# Patient Record
Sex: MALE | Race: WHITE | Employment: UNEMPLOYED | URBAN - METROPOLITAN AREA
[De-identification: names, ages, dates, MRNs, and addresses within clinical notes are randomized per-mention and may not be internally consistent; named-entity substitution may affect disease eponyms.]

---

## 2018-07-03 ENCOUNTER — OFFICE VISIT (OUTPATIENT)
Dept: SURGERY | Facility: CLINIC | Age: 53
End: 2018-07-03

## 2018-07-03 VITALS
DIASTOLIC BLOOD PRESSURE: 82 MMHG | SYSTOLIC BLOOD PRESSURE: 125 MMHG | WEIGHT: 249 LBS | RESPIRATION RATE: 20 BRPM | HEART RATE: 54 BPM | BODY MASS INDEX: 29.4 KG/M2 | TEMPERATURE: 98 F | HEIGHT: 77 IN

## 2018-07-03 DIAGNOSIS — Z83.71 FAMILY HISTORY OF COLONIC POLYPS: Primary | ICD-10-CM

## 2018-07-03 PROBLEM — Z83.719 FAMILY HISTORY OF COLONIC POLYPS: Status: ACTIVE | Noted: 2018-07-03

## 2018-07-03 PROCEDURE — 99243 OFF/OP CNSLTJ NEW/EST LOW 30: CPT | Performed by: COLON & RECTAL SURGERY

## 2018-07-03 NOTE — H&P
New Patient Visit Note       Active Problems      1.  Family history of colonic polyps, in father        Chief Complaint   Patient presents with:  Colonoscopy: NEW PT - COLONOSCOPY CONSULT-denies problems or concerns-father has hx polyps      History of Pre does not have history of prior polyps. The patient does not have a history of prior colon cancer. The patient does not have a history of black/tarry stools. The patient does not have a history of bright red blood per rectum.   The patient does no Never Used                           Current Outpatient Prescriptions:  PEG 3350-KCl-Na Bicarb-NaCl (TRILYTE) 420 g Oral Recon Soln Starting at 4:00 pm the night before procedure, drink 8 ounces of the prep every 15-20 minutes until finished Disp: 1 Bottle distension. There is no tenderness. There is no rebound and no guarding. Musculoskeletal: Normal range of motion. He exhibits no edema. Neurological: He is alert and oriented to person, place, and time. Skin: Skin is warm and dry. No rash noted.  He i colonoscopy at our earliest mutual convenience, on August 9, 2018. All risks, benefits, complications and alternatives to the proposed operation were fully discussed with the patient. All questions from the patient were answered in detail.  A description

## 2018-07-04 RX ORDER — POLYETHYLENE GLYCOL 3350, SODIUM CHLORIDE, SODIUM BICARBONATE, POTASSIUM CHLORIDE 420; 11.2; 5.72; 1.48 G/4L; G/4L; G/4L; G/4L
POWDER, FOR SOLUTION ORAL
Qty: 1 BOTTLE | Refills: 0 | Status: SHIPPED | OUTPATIENT
Start: 2018-07-04 | End: 2018-08-13 | Stop reason: ALTCHOICE

## 2018-07-04 NOTE — PATIENT INSTRUCTIONS
The patient is a pleasant 65-year-old male who presents in consultation for a colonoscopy. The patient is referred to me by his primary provider, Dr. Key Owusu. The patient has never had a colonoscopy.   He denies any gastrointestinal or constitutional sym

## 2018-08-09 ENCOUNTER — LAB REQUISITION (OUTPATIENT)
Dept: LAB | Facility: HOSPITAL | Age: 53
End: 2018-08-09
Payer: COMMERCIAL

## 2018-08-09 DIAGNOSIS — Z12.11 ENCOUNTER FOR SCREENING FOR MALIGNANT NEOPLASM OF COLON: ICD-10-CM

## 2018-08-09 PROCEDURE — 88305 TISSUE EXAM BY PATHOLOGIST: CPT | Performed by: COLON & RECTAL SURGERY

## 2018-08-13 ENCOUNTER — TELEPHONE (OUTPATIENT)
Dept: SURGERY | Facility: CLINIC | Age: 53
End: 2018-08-13

## 2018-08-13 ENCOUNTER — OFFICE VISIT (OUTPATIENT)
Dept: SURGERY | Facility: CLINIC | Age: 53
End: 2018-08-13
Payer: COMMERCIAL

## 2018-08-13 VITALS
SYSTOLIC BLOOD PRESSURE: 145 MMHG | DIASTOLIC BLOOD PRESSURE: 92 MMHG | HEART RATE: 68 BPM | BODY MASS INDEX: 29.4 KG/M2 | HEIGHT: 77 IN | WEIGHT: 249 LBS

## 2018-08-13 DIAGNOSIS — C18.6 CANCER OF DESCENDING COLON (HCC): ICD-10-CM

## 2018-08-13 DIAGNOSIS — C20 RECTAL CANCER (HCC): Primary | ICD-10-CM

## 2018-08-13 DIAGNOSIS — D12.6 ADENOMATOUS POLYP OF COLON, UNSPECIFIED PART OF COLON: ICD-10-CM

## 2018-08-13 DIAGNOSIS — Z83.71 FAMILY HISTORY OF COLONIC POLYPS: ICD-10-CM

## 2018-08-13 PROCEDURE — 99215 OFFICE O/P EST HI 40 MIN: CPT | Performed by: COLON & RECTAL SURGERY

## 2018-08-13 NOTE — TELEPHONE ENCOUNTER
Called pt to inform him of need for flexible sigmoidoscopy tomorrow, to only take clear liquids for rest of day and the he needs to get 2 fleets enemas from any pharmacy.  He will also need a  tomorrow and that more of this would be explained that his

## 2018-08-14 NOTE — PROGRESS NOTES
Follow Up Visit Note       Active Problems      1. Rectal cancer (HCC) at 15 cm between the second and third rectal folds    2. Cancer of descending colon ([address] approximately 40 cm, within a large adenomatous polyp    3.  Family history of colonic polyps, not in favor of radiation treatment to lesion up at 15 cm from anal verge.   With a robotic resection, he anticipates a clean mesorectal excision and that only the distal lymph nodes in the rectum would be adequately treated with radiation therapy, and leena tablets by mouth at 1pm, 2pm and 11pm Disp: 6 tablet Rfl: 0   metRONIDAZOLE (FLAGYL) 500 MG Oral Tab Take 1 tablet by mouth at 1pm, 2pm and 11pm Disp: 3 tablet Rfl: 0        Review of Systems  The Review of Systems has been reviewed by me during today.   Re regarding rectal cancer and cancer of the descending colon. This patient underwent colonoscopy on August 9, 2018. He was unfortunately found at screening colonoscopy to have 2 polyps, and 2 cancers.   The most significant cancers in the rectal region be will require robotic low anterior resection the rectosigmoid colon to include both the lesion at 15 cm, and the lesion between 35 and 40 cm from anal verge. Radical resection with lymphadenectomy will take place.     I preoperatively discussed this case wi

## 2018-08-14 NOTE — PATIENT INSTRUCTIONS
This patient presents for further care and treatment regarding rectal cancer and cancer of the descending colon. This patient underwent colonoscopy on August 9, 2018. He was unfortunately found at screening colonoscopy to have 2 polyps, and 2 cancers. place.  The patient's wife was present. This patient will require robotic low anterior resection the rectosigmoid colon to include both the lesion at 15 cm, and the lesion between 35 and 40 cm from anal verge.   Radical resection with lymphadenectomy herman

## 2018-08-15 PROBLEM — D12.6 ADENOMATOUS POLYP OF COLON: Status: ACTIVE | Noted: 2018-08-15

## 2018-08-15 PROBLEM — C20 RECTAL CANCER (HCC): Status: ACTIVE | Noted: 2018-08-15

## 2018-08-15 PROBLEM — C18.6 CANCER OF DESCENDING COLON (HCC): Status: ACTIVE | Noted: 2018-08-15

## 2018-08-15 RX ORDER — METRONIDAZOLE 500 MG/1
TABLET ORAL
Qty: 3 TABLET | Refills: 0 | Status: ON HOLD | OUTPATIENT
Start: 2018-08-15 | End: 2018-10-12

## 2018-08-15 RX ORDER — POLYETHYLENE GLYCOL 3350, SODIUM CHLORIDE, SODIUM BICARBONATE, POTASSIUM CHLORIDE 420; 11.2; 5.72; 1.48 G/4L; G/4L; G/4L; G/4L
POWDER, FOR SOLUTION ORAL
Qty: 1 BOTTLE | Refills: 0 | Status: ON HOLD | OUTPATIENT
Start: 2018-08-15 | End: 2018-10-12

## 2018-08-15 RX ORDER — NEOMYCIN SULFATE 500 MG/1
TABLET ORAL
Qty: 6 TABLET | Refills: 0 | Status: ON HOLD | OUTPATIENT
Start: 2018-08-15 | End: 2018-10-12

## 2018-08-16 ENCOUNTER — TELEPHONE (OUTPATIENT)
Dept: SURGERY | Facility: CLINIC | Age: 53
End: 2018-08-16

## 2018-08-16 DIAGNOSIS — C20 RECTAL CANCER (HCC): Primary | ICD-10-CM

## 2018-09-05 ENCOUNTER — TELEPHONE (OUTPATIENT)
Dept: SURGERY | Facility: CLINIC | Age: 53
End: 2018-09-05

## 2018-09-05 DIAGNOSIS — C20 RECTAL CANCER (HCC): Primary | ICD-10-CM

## 2018-09-07 ENCOUNTER — TELEPHONE (OUTPATIENT)
Dept: SURGERY | Facility: CLINIC | Age: 53
End: 2018-09-07

## 2018-09-07 DIAGNOSIS — C20 RECTAL CANCER (HCC): Primary | ICD-10-CM

## 2018-09-19 ENCOUNTER — TELEPHONE (OUTPATIENT)
Dept: SURGERY | Facility: CLINIC | Age: 53
End: 2018-09-19

## 2018-10-04 ENCOUNTER — TELEPHONE (OUTPATIENT)
Dept: SURGERY | Facility: CLINIC | Age: 53
End: 2018-10-04

## 2018-10-04 DIAGNOSIS — C20 RECTAL CANCER (HCC): Primary | ICD-10-CM

## 2018-10-04 RX ORDER — MULTIVITAMIN WITH IRON
1 TABLET ORAL DAILY
COMMUNITY
End: 2020-06-04

## 2018-10-04 RX ORDER — ACETAMINOPHEN 500 MG
1000 TABLET ORAL ONCE
Status: CANCELLED | OUTPATIENT
Start: 2018-10-04 | End: 2018-10-04

## 2018-10-05 ENCOUNTER — APPOINTMENT (OUTPATIENT)
Dept: LAB | Facility: HOSPITAL | Age: 53
End: 2018-10-05
Payer: COMMERCIAL

## 2018-10-05 DIAGNOSIS — C20 RECTAL CANCER (HCC): ICD-10-CM

## 2018-10-05 PROCEDURE — 85027 COMPLETE CBC AUTOMATED: CPT

## 2018-10-05 PROCEDURE — 80048 BASIC METABOLIC PNL TOTAL CA: CPT

## 2018-10-05 PROCEDURE — 36415 COLL VENOUS BLD VENIPUNCTURE: CPT

## 2018-10-12 ENCOUNTER — HOSPITAL ENCOUNTER (INPATIENT)
Facility: HOSPITAL | Age: 53
LOS: 2 days | Discharge: HOME HEALTH | DRG: 330 | End: 2018-10-14
Attending: COLON & RECTAL SURGERY | Admitting: COLON & RECTAL SURGERY
Payer: COMMERCIAL

## 2018-10-12 ENCOUNTER — ANESTHESIA EVENT (OUTPATIENT)
Dept: SURGERY | Facility: HOSPITAL | Age: 53
DRG: 330 | End: 2018-10-12
Payer: COMMERCIAL

## 2018-10-12 ENCOUNTER — ANESTHESIA (OUTPATIENT)
Dept: SURGERY | Facility: HOSPITAL | Age: 53
DRG: 330 | End: 2018-10-12
Payer: COMMERCIAL

## 2018-10-12 DIAGNOSIS — C20 RECTAL CANCER (HCC): Primary | ICD-10-CM

## 2018-10-12 PROCEDURE — 88381 MICRODISSECTION MANUAL: CPT | Performed by: COLON & RECTAL SURGERY

## 2018-10-12 PROCEDURE — 88309 TISSUE EXAM BY PATHOLOGIST: CPT | Performed by: COLON & RECTAL SURGERY

## 2018-10-12 PROCEDURE — 81275 KRAS GENE VARIANTS EXON 2: CPT | Performed by: COLON & RECTAL SURGERY

## 2018-10-12 PROCEDURE — 88342 IMHCHEM/IMCYTCHM 1ST ANTB: CPT | Performed by: COLON & RECTAL SURGERY

## 2018-10-12 PROCEDURE — 8E0W4CZ ROBOTIC ASSISTED PROCEDURE OF TRUNK REGION, PERCUTANEOUS ENDOSCOPIC APPROACH: ICD-10-PCS | Performed by: COLON & RECTAL SURGERY

## 2018-10-12 PROCEDURE — 88377 M/PHMTRC ALYS ISHQUANT/SEMIQ: CPT | Performed by: COLON & RECTAL SURGERY

## 2018-10-12 PROCEDURE — P9045 ALBUMIN (HUMAN), 5%, 250 ML: HCPCS

## 2018-10-12 PROCEDURE — 81403 MOPATH PROCEDURE LEVEL 4: CPT | Performed by: COLON & RECTAL SURGERY

## 2018-10-12 PROCEDURE — 07BC4ZZ EXCISION OF PELVIS LYMPHATIC, PERCUTANEOUS ENDOSCOPIC APPROACH: ICD-10-PCS | Performed by: COLON & RECTAL SURGERY

## 2018-10-12 PROCEDURE — 0DBM4ZZ EXCISION OF DESCENDING COLON, PERCUTANEOUS ENDOSCOPIC APPROACH: ICD-10-PCS | Performed by: COLON & RECTAL SURGERY

## 2018-10-12 PROCEDURE — 88307 TISSUE EXAM BY PATHOLOGIST: CPT | Performed by: COLON & RECTAL SURGERY

## 2018-10-12 PROCEDURE — 0DJD8ZZ INSPECTION OF LOWER INTESTINAL TRACT, VIA NATURAL OR ARTIFICIAL OPENING ENDOSCOPIC: ICD-10-PCS | Performed by: COLON & RECTAL SURGERY

## 2018-10-12 PROCEDURE — 81404 MOPATH PROCEDURE LEVEL 5: CPT | Performed by: COLON & RECTAL SURGERY

## 2018-10-12 PROCEDURE — 0D1B4Z4 BYPASS ILEUM TO CUTANEOUS, PERCUTANEOUS ENDOSCOPIC APPROACH: ICD-10-PCS | Performed by: COLON & RECTAL SURGERY

## 2018-10-12 PROCEDURE — 81210 BRAF GENE: CPT | Performed by: COLON & RECTAL SURGERY

## 2018-10-12 PROCEDURE — 0DBP4ZZ EXCISION OF RECTUM, PERCUTANEOUS ENDOSCOPIC APPROACH: ICD-10-PCS | Performed by: COLON & RECTAL SURGERY

## 2018-10-12 PROCEDURE — 88360 TUMOR IMMUNOHISTOCHEM/MANUAL: CPT | Performed by: COLON & RECTAL SURGERY

## 2018-10-12 RX ORDER — FAMOTIDINE 10 MG/ML
20 INJECTION, SOLUTION INTRAVENOUS 2 TIMES DAILY
Status: DISCONTINUED | OUTPATIENT
Start: 2018-10-12 | End: 2018-10-14

## 2018-10-12 RX ORDER — METOCLOPRAMIDE HYDROCHLORIDE 5 MG/ML
10 INJECTION INTRAMUSCULAR; INTRAVENOUS AS NEEDED
Status: DISCONTINUED | OUTPATIENT
Start: 2018-10-12 | End: 2018-10-12 | Stop reason: HOSPADM

## 2018-10-12 RX ORDER — ACETAMINOPHEN 500 MG
1000 TABLET ORAL ONCE
Status: DISCONTINUED | OUTPATIENT
Start: 2018-10-12 | End: 2018-10-12 | Stop reason: HOSPADM

## 2018-10-12 RX ORDER — MORPHINE SULFATE 4 MG/ML
INJECTION, SOLUTION INTRAMUSCULAR; INTRAVENOUS
Status: COMPLETED
Start: 2018-10-12 | End: 2018-10-12

## 2018-10-12 RX ORDER — MORPHINE SULFATE 4 MG/ML
1 INJECTION, SOLUTION INTRAMUSCULAR; INTRAVENOUS EVERY 2 HOUR PRN
Status: DISCONTINUED | OUTPATIENT
Start: 2018-10-12 | End: 2018-10-14

## 2018-10-12 RX ORDER — NALOXONE HYDROCHLORIDE 0.4 MG/ML
80 INJECTION, SOLUTION INTRAMUSCULAR; INTRAVENOUS; SUBCUTANEOUS AS NEEDED
Status: DISCONTINUED | OUTPATIENT
Start: 2018-10-12 | End: 2018-10-12 | Stop reason: HOSPADM

## 2018-10-12 RX ORDER — ONDANSETRON 2 MG/ML
4 INJECTION INTRAMUSCULAR; INTRAVENOUS EVERY 4 HOURS PRN
Status: DISCONTINUED | OUTPATIENT
Start: 2018-10-12 | End: 2018-10-14

## 2018-10-12 RX ORDER — ONDANSETRON 2 MG/ML
4 INJECTION INTRAMUSCULAR; INTRAVENOUS AS NEEDED
Status: DISCONTINUED | OUTPATIENT
Start: 2018-10-12 | End: 2018-10-12 | Stop reason: HOSPADM

## 2018-10-12 RX ORDER — HEPARIN SODIUM 5000 [USP'U]/ML
5000 INJECTION, SOLUTION INTRAVENOUS; SUBCUTANEOUS ONCE
Status: COMPLETED | OUTPATIENT
Start: 2018-10-12 | End: 2018-10-12

## 2018-10-12 RX ORDER — METRONIDAZOLE 500 MG/100ML
500 INJECTION, SOLUTION INTRAVENOUS ONCE
Status: COMPLETED | OUTPATIENT
Start: 2018-10-12 | End: 2018-10-12

## 2018-10-12 RX ORDER — BUPIVACAINE HYDROCHLORIDE AND EPINEPHRINE 5; 5 MG/ML; UG/ML
INJECTION, SOLUTION EPIDURAL; INTRACAUDAL; PERINEURAL AS NEEDED
Status: DISCONTINUED | OUTPATIENT
Start: 2018-10-12 | End: 2018-10-12 | Stop reason: HOSPADM

## 2018-10-12 RX ORDER — HEPARIN SODIUM 5000 [USP'U]/ML
5000 INJECTION, SOLUTION INTRAVENOUS; SUBCUTANEOUS EVERY 8 HOURS SCHEDULED
Status: DISCONTINUED | OUTPATIENT
Start: 2018-10-13 | End: 2018-10-14

## 2018-10-12 RX ORDER — HYDROCODONE BITARTRATE AND ACETAMINOPHEN 5; 325 MG/1; MG/1
2 TABLET ORAL EVERY 4 HOURS PRN
Status: DISCONTINUED | OUTPATIENT
Start: 2018-10-12 | End: 2018-10-14

## 2018-10-12 RX ORDER — MORPHINE SULFATE 4 MG/ML
2 INJECTION, SOLUTION INTRAMUSCULAR; INTRAVENOUS EVERY 2 HOUR PRN
Status: DISCONTINUED | OUTPATIENT
Start: 2018-10-12 | End: 2018-10-14

## 2018-10-12 RX ORDER — POLYETHYLENE GLYCOL 3350 17 G/17G
17 POWDER, FOR SOLUTION ORAL DAILY PRN
Status: DISCONTINUED | OUTPATIENT
Start: 2018-10-12 | End: 2018-10-14

## 2018-10-12 RX ORDER — FAMOTIDINE 20 MG/1
20 TABLET ORAL 2 TIMES DAILY
Status: DISCONTINUED | OUTPATIENT
Start: 2018-10-12 | End: 2018-10-14

## 2018-10-12 RX ORDER — KETOROLAC TROMETHAMINE 30 MG/ML
30 INJECTION, SOLUTION INTRAMUSCULAR; INTRAVENOUS EVERY 6 HOURS
Status: COMPLETED | OUTPATIENT
Start: 2018-10-12 | End: 2018-10-13

## 2018-10-12 RX ORDER — MORPHINE SULFATE 4 MG/ML
2 INJECTION, SOLUTION INTRAMUSCULAR; INTRAVENOUS EVERY 5 MIN PRN
Status: DISCONTINUED | OUTPATIENT
Start: 2018-10-12 | End: 2018-10-12 | Stop reason: HOSPADM

## 2018-10-12 RX ORDER — MORPHINE SULFATE 4 MG/ML
4 INJECTION, SOLUTION INTRAMUSCULAR; INTRAVENOUS EVERY 2 HOUR PRN
Status: DISCONTINUED | OUTPATIENT
Start: 2018-10-12 | End: 2018-10-14

## 2018-10-12 RX ORDER — SODIUM CHLORIDE, SODIUM LACTATE, POTASSIUM CHLORIDE, CALCIUM CHLORIDE 600; 310; 30; 20 MG/100ML; MG/100ML; MG/100ML; MG/100ML
INJECTION, SOLUTION INTRAVENOUS CONTINUOUS
Status: DISCONTINUED | OUTPATIENT
Start: 2018-10-12 | End: 2018-10-13

## 2018-10-12 RX ORDER — SODIUM CHLORIDE, SODIUM LACTATE, POTASSIUM CHLORIDE, CALCIUM CHLORIDE 600; 310; 30; 20 MG/100ML; MG/100ML; MG/100ML; MG/100ML
INJECTION, SOLUTION INTRAVENOUS CONTINUOUS
Status: DISCONTINUED | OUTPATIENT
Start: 2018-10-12 | End: 2018-10-12 | Stop reason: HOSPADM

## 2018-10-12 RX ORDER — SODIUM CHLORIDE 9 MG/ML
INJECTION, SOLUTION INTRAVENOUS CONTINUOUS
Status: DISCONTINUED | OUTPATIENT
Start: 2018-10-12 | End: 2018-10-12 | Stop reason: HOSPADM

## 2018-10-12 RX ORDER — GABAPENTIN 300 MG/1
300 CAPSULE ORAL ONCE
Status: COMPLETED | OUTPATIENT
Start: 2018-10-12 | End: 2018-10-12

## 2018-10-12 RX ORDER — GABAPENTIN 300 MG/1
300 CAPSULE ORAL NIGHTLY
Status: DISCONTINUED | OUTPATIENT
Start: 2018-10-12 | End: 2018-10-14

## 2018-10-12 RX ORDER — HYDROCODONE BITARTRATE AND ACETAMINOPHEN 5; 325 MG/1; MG/1
1 TABLET ORAL EVERY 4 HOURS PRN
Status: DISCONTINUED | OUTPATIENT
Start: 2018-10-12 | End: 2018-10-14

## 2018-10-12 RX ORDER — KETOROLAC TROMETHAMINE 15 MG/ML
15 INJECTION, SOLUTION INTRAMUSCULAR; INTRAVENOUS EVERY 6 HOURS
Status: DISCONTINUED | OUTPATIENT
Start: 2018-10-12 | End: 2018-10-13

## 2018-10-12 NOTE — ANESTHESIA PREPROCEDURE EVALUATION
PRE-OP EVALUATION    Patient Name: Odessa Cisneros    Pre-op Diagnosis: Rectal cancer ([facility] [address] [C20]    Procedure(s):  XI ROBOTIC LAPAROSCOPIC LOW ANTERIOR RESECTION OF THE RECTOSIGMOID COLON AND DESCENDING COLON FOR TWO LESIONS WITH INK TATTOO AT 15 CENTIME MET: >4                                           Endo/Other    Negative endo/other ROS. Pulmonary    Negative pulmonary ROS. Neuro/Psych    Negative neuro/psych ROS.                                 Pas

## 2018-10-12 NOTE — H&P
Active Problems      1. Rectal cancer (HCC) at 15 cm between the second and third rectal folds    2. Cancer of descending colon ([facility] [address] approximately 40 cm, within a large adenomatous polyp    3. Family history of colonic polyps, in father    3.  Adenomatous robotic resection, he anticipates a clean mesorectal excision and that only the distal lymph nodes in the rectum would be adequately treated with radiation therapy, and would not provide any benefit to the patient.      Dr. Lisa Aase recommended proceeding wit Assessment   Rectal cancer (HCC) at 15 cm between the second and third rectal folds  (primary encounter diagnosis)  Cancer of descending colon ([facility] [address] approximately 40 cm, within a large adenomatous polyp  Family history of colonic polyps, in father  [place]

## 2018-10-12 NOTE — ANESTHESIA POSTPROCEDURE EVALUATION
[address] Patient Status:  Surgery Admit   Age/Gender 48year old male MRN [id number]   [facility] SURGERY Attending Samira Kwon MD   Hosp Day # 0 PCP Mario MD Abel       Anesthesia Post-op Note    Procedure(s)

## 2018-10-12 NOTE — OPERATIVE REPORT
[facility]  Operative Note    [facility] Location: OR   [facility] [id number] MRN [id number]   Admission Date 10/12/2018 Operation Date 10/12/2018   Attending Physician Olga Alexis MD Operating Physician Naga Bobby MD     Pre-Operative Diagnosis: R site, this extremely complicated mobilization of the already very high splenic flexure in this very tall individual.  The ink tattoo was just in the distal descending colon, however the patient's sigmoid colon is at least 30-40 cm in length.   The ink tatto line was visibly intact, but the staple line was allowing bubbles of air to pass during insufflation with proctoscopy with the proximal bowel occluded.           Operative summary:      Following adequate general anesthesia, the patient was placed in the li dissection. This was carried to the level of the transverse omentum.   In this particular individual it was carried all the way to just beyond the hepatic flexure due to some inflammatory process or unusual anatomy of the transverse omentum which was up ov rectum to allow for even lower dissection within the pelvis. We ended up taking down the rectum below the level of the prostate gland, all the way down to the levator ani musculature.   This was done preserving the entire mesorectum and a TME type dissecti placing a Kocker across the bowel lumen, then dividing with sharp scalpel dissection. EEA sizers were used to select the correct size instrument for staple anastomosis.     The EEA anvil was secured within the proximal cut colon with a pursestring suture with insufflation of air. The bubble leak was gone. Direct visualization with proctoscopy revealed a wide open lumen, no bleeding, and the suture line to be intact.   The mucosal surfaces appeared to have good blood supply right up to the anastomotic ring cc    Drains: One large Bill drain    Complications: None

## 2018-10-13 RX ORDER — IBUPROFEN 600 MG/1
600 TABLET ORAL EVERY 6 HOURS PRN
Status: DISCONTINUED | OUTPATIENT
Start: 2018-10-13 | End: 2018-10-14

## 2018-10-13 NOTE — PROGRESS NOTES
[facility]  Progress Note    Priti Platt Patient Status:  Inpatient    1965 MRN [id number]   [facility] 3NW-A Attending Benton Covington MD   Hosp Day # 1 PCP Vicky Samano MD     Subjective:  Pt sitting up in chair, feeling we

## 2018-10-13 NOTE — PLAN OF CARE
Pt a&o, VSS. On RA, no sob noted. Hypoactive bowel sounds, no gas noted. Dark brown output noted from ileostomy. SANG noted to right side, bloody output, leaking has improved. Incisions c/d/i. Mccoy intact, clear yellow output, will d/c in am per protocol.  Janay Villalpando

## 2018-10-13 NOTE — PLAN OF CARE
Problem: Patient/Family Goals  Goal: Patient/Family Short Term Goal  Patient's Short Term Goal: 'get  Back to normal'    Interventions:   - encourage is/db&c & ambulation.    - See additional Care Plan goals for specific interventions  Outcome: Progressing Complete POLST form as appropriate  - Assess patient's ability to be responsible for managing their own health  - Refer to Case Management Department for coordinating discharge planning if the patient needs post-hospital services based on physician/LIP ord suction: area leaking slightly at site. Ostomy pouch remains around dylan site.   Ileostomy pouch cdi

## 2018-10-13 NOTE — PLAN OF CARE
Problem: PAIN - ADULT  Goal: Verbalizes/displays adequate comfort level or patient's stated pain goal  INTERVENTIONS:  - Encourage pt to monitor pain and request assistance  - Assess pain using appropriate pain scale  - Administer analgesics based on type here 3-5 days    Problem: GASTROINTESTINAL - ADULT  Goal: Achieves appropriate nutritional intake (bariatric)  INTERVENTIONS:  - Monitor for over-consumption  - Identify factors contributing to increased intake, treat as appropriate  - Monitor I&O, WT and

## 2018-10-14 VITALS
RESPIRATION RATE: 18 BRPM | WEIGHT: 238.63 LBS | BODY MASS INDEX: 28.18 KG/M2 | HEART RATE: 84 BPM | TEMPERATURE: 98 F | SYSTOLIC BLOOD PRESSURE: 135 MMHG | HEIGHT: 77 IN | DIASTOLIC BLOOD PRESSURE: 73 MMHG | OXYGEN SATURATION: 100 %

## 2018-10-14 PROCEDURE — 90471 IMMUNIZATION ADMIN: CPT

## 2018-10-14 RX ORDER — HYDROCODONE BITARTRATE AND ACETAMINOPHEN 5; 325 MG/1; MG/1
1 TABLET ORAL EVERY 4 HOURS PRN
Qty: 20 TABLET | Refills: 0 | Status: SHIPPED | OUTPATIENT
Start: 2018-10-14 | End: 2018-10-30

## 2018-10-14 NOTE — PLAN OF CARE
Problem: Patient/Family Goals  Goal: Patient/Family Short Term Goal  Patient's Short Term Goal: 'go home'    Interventions:   - reverse demonstration given on dylan drain care & ileostomy care.   Pt instructed by dr Xenia Hatchet to go to ostomy clinic Monday or Tues resources and transportation as appropriate  - Identify discharge learning needs (meds, wound care, etc)  - Arrange for interpreters to assist at discharge as needed  - Consider post-discharge preferences of patient/family/discharge partner  - Complete JAMES drain sites and surrounding tissue  - Implement wound care per orders  - Initiate isolation precautions as appropriate  - Initiate Pressure Ulcer prevention bundle as indicated  Outcome: Adequate for Discharge  dylan draining serosang fluid.   Dressing change

## 2018-10-14 NOTE — PROGRESS NOTES
[facility]  Progress Note    Catinajhony Toure Giulia Patient Status:  Inpatient    1965 MRN [id number]   [facility] 3NW-A Attending Fabricio Vidal MD   Hosp Day # 2 PCP Kristina Remy MD     Subjective:  No new complaints, tolerating diet

## 2018-10-14 NOTE — CM/SW NOTE
10/14/18 0900   Discharge disposition   Expected discharge disposition Home-Health   Name of Facillity/Home Care/Hospice Residential     RN informed sw that pt to dc home today with new ostomy.   PT has not been seen by wound care and will call to get OP

## 2018-10-14 NOTE — HOME CARE LIAISON
Patient is confirmed and agreeable to [facility].   Thank you for the referral.

## 2018-10-14 NOTE — DISCHARGE SUMMARY
[facility]  Discharge Summary    Lolita Platt Patient Status:  Inpatient    1965 MRN [id number]   [facility] 3NW-A Attending Jacinta Barraza MD   [address] Day # 2 PCP Surya Sharif MD     Date of Admission: 10/12/2018    Date of Barbara Burk shower  [place] for pain  Home with drain  Stoma care      Dory Garces  10/14/2018  9:26 AM

## 2018-10-15 ENCOUNTER — TELEPHONE (OUTPATIENT)
Dept: SURGERY | Facility: CLINIC | Age: 53
End: 2018-10-15

## 2018-10-15 ENCOUNTER — TELEPHONE (OUTPATIENT)
Dept: WOUND CARE | Facility: HOSPITAL | Age: 53
End: 2018-10-15

## 2018-10-15 DIAGNOSIS — Z71.89 ENCOUNTER FOR OSTOMY CARE EDUCATION: Primary | ICD-10-CM

## 2018-10-15 NOTE — TELEPHONE ENCOUNTER
Pt was DC'd from the hospital yesterday and is requesting an order for wound care clinic to be entered per DJP. The referral was entered.  Pt V/U.

## 2018-10-16 ENCOUNTER — OFFICE VISIT (OUTPATIENT)
Dept: WOUND CARE | Facility: HOSPITAL | Age: 53
End: 2018-10-16
Attending: COLON & RECTAL SURGERY
Payer: COMMERCIAL

## 2018-10-16 DIAGNOSIS — C18.6 CANCER OF DESCENDING COLON (HCC): Primary | ICD-10-CM

## 2018-10-16 PROCEDURE — 99215 OFFICE O/P EST HI 40 MIN: CPT

## 2018-10-16 NOTE — TELEPHONE ENCOUNTER
Ostomy clinic contacted patient to set up outpatient appointment , pt to call [phone] to schedule visit with ostomy nurse.

## 2018-10-17 NOTE — PROGRESS NOTES
[facility]  Outpatient Ostomy Progress Note    Nicholsstephen Norman Giulia Patient Status:  Wound Series    1965 MRN [id number]   Location [facility] Attending Hilary Soares MD   Days in hospital 0 Primary Bj Hayward MD     Hist pouch change using Winnie [id number] and barrier ring [id number]. Pt registered with [employer] and given written literature. Pt to make follow up appt as needed. Pt given supplies for several weeks of pouch changes.     Total time spent with patient:  1 Hour

## 2018-10-18 ENCOUNTER — OFFICE VISIT (OUTPATIENT)
Dept: SURGERY | Facility: CLINIC | Age: 53
End: 2018-10-18

## 2018-10-18 VITALS
DIASTOLIC BLOOD PRESSURE: 73 MMHG | BODY MASS INDEX: 28.81 KG/M2 | HEIGHT: 77 IN | WEIGHT: 244 LBS | SYSTOLIC BLOOD PRESSURE: 108 MMHG | HEART RATE: 82 BPM | TEMPERATURE: 99 F

## 2018-10-18 DIAGNOSIS — C18.6 CANCER OF DESCENDING COLON (HCC): ICD-10-CM

## 2018-10-18 DIAGNOSIS — C20 RECTAL CANCER (HCC): Primary | ICD-10-CM

## 2018-10-18 DIAGNOSIS — Z83.71 FAMILY HISTORY OF COLONIC POLYPS: ICD-10-CM

## 2018-10-18 DIAGNOSIS — D12.6 ADENOMATOUS POLYP OF COLON, UNSPECIFIED PART OF COLON: ICD-10-CM

## 2018-10-18 PROCEDURE — 99024 POSTOP FOLLOW-UP VISIT: CPT | Performed by: COLON & RECTAL SURGERY

## 2018-10-19 NOTE — PROGRESS NOTES
Post Operative Visit Note       Active Problems  1. Rectal cancer (HCC) at 15 cm between the second and third rectal folds    2. Family history of colonic polyps, in father    3.  Cancer of descending colon ([address] approximately 40 cm, within a large adenoma anal verge, Dr. Antoine Downing stated that he felt that with a mesorectal excision, further adjuvant radiation treatment preoperatively would not necessarily be helpful.     Since we already consulted Dr. Antoine Downing verbally, I feel that it would be prudent for him to Sexual activity: Not on file    Other Topics      Concerns:        Caffeine Concern: Not Asked        Exercise: Not Asked        Seat Belt: Not Asked        Special Diet: Not Asked        Stress Concern: Not Asked        Weight Concern: Not Asked    Soc colon      Plan   Patient underwent a robotic laparoscopic low anterior resection the rectosigmoid colon for 2 separate colon cancers, one at the descending colon within a polyp, the other at the rectosigmoid region just above the mid rectum.   The larger l the right midabdomen consisting of a protruding ileostomy with bilious contents within the pouch. The ileostomy is pink and viable. This patient may resume driving. He can go back to work within the next 10-14 days.   He should remain on light duty bot

## 2018-10-20 NOTE — PATIENT INSTRUCTIONS
Patient underwent a robotic laparoscopic low anterior resection the rectosigmoid colon for 2 separate colon cancers, one at the descending colon within a polyp, the other at the rectosigmoid region just above the mid rectum.   The larger lesion was the focu consisting of a protruding ileostomy with bilious contents within the pouch. The ileostomy is pink and viable. This patient may resume driving. He can go back to work within the next 10-14 days.   He should remain on light duty both at work and at home

## 2018-10-30 ENCOUNTER — OFFICE VISIT (OUTPATIENT)
Dept: HEMATOLOGY/ONCOLOGY | Facility: HOSPITAL | Age: 53
End: 2018-10-30
Attending: INTERNAL MEDICINE
Payer: COMMERCIAL

## 2018-10-30 VITALS
HEIGHT: 77.01 IN | WEIGHT: 242.38 LBS | BODY MASS INDEX: 28.62 KG/M2 | TEMPERATURE: 97 F | DIASTOLIC BLOOD PRESSURE: 87 MMHG | OXYGEN SATURATION: 99 % | HEART RATE: 71 BPM | SYSTOLIC BLOOD PRESSURE: 153 MMHG | RESPIRATION RATE: 18 BRPM

## 2018-10-30 DIAGNOSIS — R97.0 ELEVATED CARCINOEMBRYONIC ANTIGEN (CEA): ICD-10-CM

## 2018-10-30 DIAGNOSIS — C20 RECTAL CANCER (HCC): Primary | ICD-10-CM

## 2018-10-30 PROCEDURE — 99245 OFF/OP CONSLTJ NEW/EST HI 55: CPT | Performed by: INTERNAL MEDICINE

## 2018-10-30 NOTE — PROGRESS NOTES
Patient is here for MD consult for colon cancer. Patient had a colon resection on October 12th with Dr Geovani Boo. Patient is here to discuss different tx options. Denies pain. Healing well from surgery.        Education Record    Learner:  Patient    Disease

## 2018-11-02 NOTE — CONSULTS
[facility]    PATIENT'S NAME: Royer Rush   CONSULTING PHYSICIAN: Radha Rm M.D.    PATIENT ACCOUNT #: [de-identified] LOCATION: [address] [place] [address] RECORD #: [id number] YOB: 1965   CONSULTATION DATE: 10/30/2018       CANCER wanted to get to the 1-year anniversary of his start date in order to qualify for [place]. As a result, he scheduled his surgery with Dr. Sofiya Gould in early October.   He states that he was, at one point, told that he needed a CT scan, but he does not recall tanner life.  He currently works as an  for a public [employer] firm. 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  He has never smoked. His alcohol intake is 1 beer per day.   He has been  for 28 years, and he exercises adenopathy. LUNGS:  Resonant to percussion and clear to auscultation, with no wheezing, rales, or rhonchi. HEART:  Regular S1 and S2, with no murmur or gallop. ABDOMEN:  The healed incisions from his surgery.   He has an ileostomy just to the right o his very high CEA, I am very suspicious about the possibility of stage IV disease. I will be in touch with him by phone as soon as his CT scan is completed.      Dictated By Michel Yan M.D.  d: 11/01/2018 06:43:25  t: 11/01/2018 75:80:35  [facility] [id number]

## 2018-11-03 ENCOUNTER — HOSPITAL ENCOUNTER (OUTPATIENT)
Dept: CT IMAGING | Age: 53
Discharge: HOME | End: 2018-11-03
Attending: INTERNAL MEDICINE
Payer: COMMERCIAL

## 2018-11-03 DIAGNOSIS — C20 RECTAL CANCER (HCC): ICD-10-CM

## 2018-11-03 PROCEDURE — 74177 CT ABD & PELVIS W/CONTRAST: CPT | Performed by: INTERNAL MEDICINE

## 2018-11-03 PROCEDURE — 71260 CT THORAX DX C+: CPT | Performed by: INTERNAL MEDICINE

## 2018-11-05 ENCOUNTER — OFFICE VISIT (OUTPATIENT)
Dept: HEMATOLOGY/ONCOLOGY | Facility: HOSPITAL | Age: 53
End: 2018-11-05
Attending: INTERNAL MEDICINE
Payer: COMMERCIAL

## 2018-11-05 ENCOUNTER — TELEPHONE (OUTPATIENT)
Dept: HEMATOLOGY/ONCOLOGY | Facility: HOSPITAL | Age: 53
End: 2018-11-05

## 2018-11-05 VITALS
HEART RATE: 84 BPM | TEMPERATURE: 98 F | HEIGHT: 77 IN | DIASTOLIC BLOOD PRESSURE: 75 MMHG | SYSTOLIC BLOOD PRESSURE: 120 MMHG | RESPIRATION RATE: 16 BRPM | BODY MASS INDEX: 27.98 KG/M2 | WEIGHT: 237 LBS

## 2018-11-05 DIAGNOSIS — C78.01 SECONDARY CARCINOMA OF RIGHT LUNG (HCC): ICD-10-CM

## 2018-11-05 DIAGNOSIS — C20 RECTAL CANCER (HCC): Primary | ICD-10-CM

## 2018-11-05 DIAGNOSIS — C78.02 SECONDARY ADENOCARCINOMA OF LEFT LUNG (HCC): ICD-10-CM

## 2018-11-05 DIAGNOSIS — C78.7 SECONDARY LIVER CANCER (HCC): ICD-10-CM

## 2018-11-05 PROCEDURE — 99214 OFFICE O/P EST MOD 30 MIN: CPT | Performed by: INTERNAL MEDICINE

## 2018-11-05 NOTE — TELEPHONE ENCOUNTER
Left message - IR to call to place PAC on Wed or Thurs  He is to come to [address] [place] [address] on Friday at 8:45 am for CL, Chemo Ed and first cycle FOLFOX + Avastin   Asked to call to confirm he received the message.

## 2018-11-05 NOTE — TELEPHONE ENCOUNTER
Left message on his voice mail to call me back. I want to talk to him about his CT scan. Asked him to call the nurses line. They can call me on my cell - [phone] - and I will speak to him asap.   I did not leave any detail about the scan findings on

## 2018-11-06 NOTE — CONSULTS
[facility]    PATIENT'S NAME: Yamilethjhony Ami   CONSULTING PHYSICIAN: Deisy To MD   PATIENT ACCOUNT #: [de-identified] LOCATION: [address] [place] [address] RECORD #: [id number] YOB: 1965   CONSULTATION DATE: 11/05/2018       CANCER organs involved and multiple sites in each organ, that our efforts are primarily palliative. There may some day be a role for regional treatment, but it is unlikely to be curative.   Patients who have stage IV disease that is cured are those who typically treated with Xelox or Xeloxiri,  I would recommend placement of a port since he is going to need ongoing access virtually indefinitely. He agrees. I would like to try to start him this coming Friday.   The plan again is to treat him with FOLFOX and Avasti

## 2018-11-07 ENCOUNTER — HOSPITAL ENCOUNTER (OUTPATIENT)
Dept: INTERVENTIONAL RADIOLOGY/VASCULAR | Facility: HOSPITAL | Age: 53
Discharge: HOME | End: 2018-11-07
Attending: INTERNAL MEDICINE | Admitting: INTERNAL MEDICINE
Payer: COMMERCIAL

## 2018-11-07 ENCOUNTER — DIETICIAN VISIT (OUTPATIENT)
Dept: HEMATOLOGY/ONCOLOGY | Facility: HOSPITAL | Age: 53
End: 2018-11-07

## 2018-11-07 VITALS
TEMPERATURE: 98 F | HEART RATE: 78 BPM | RESPIRATION RATE: 14 BRPM | HEIGHT: 77 IN | WEIGHT: 240 LBS | BODY MASS INDEX: 28.34 KG/M2 | DIASTOLIC BLOOD PRESSURE: 75 MMHG | SYSTOLIC BLOOD PRESSURE: 116 MMHG | OXYGEN SATURATION: 100 %

## 2018-11-07 DIAGNOSIS — C20 RECTAL CANCER (HCC): ICD-10-CM

## 2018-11-07 PROCEDURE — 85610 PROTHROMBIN TIME: CPT | Performed by: INTERNAL MEDICINE

## 2018-11-07 PROCEDURE — 99152 MOD SED SAME PHYS/QHP 5/>YRS: CPT

## 2018-11-07 PROCEDURE — 36415 COLL VENOUS BLD VENIPUNCTURE: CPT

## 2018-11-07 PROCEDURE — 0JH60WZ INSERTION OF TOTALLY IMPLANTABLE VASCULAR ACCESS DEVICE INTO CHEST SUBCUTANEOUS TISSUE AND FASCIA, OPEN APPROACH: ICD-10-PCS | Performed by: RADIOLOGY

## 2018-11-07 PROCEDURE — B543ZZA ULTRASONOGRAPHY OF RIGHT JUGULAR VEINS, GUIDANCE: ICD-10-PCS | Performed by: RADIOLOGY

## 2018-11-07 PROCEDURE — B518ZZA FLUOROSCOPY OF SUPERIOR VENA CAVA, GUIDANCE: ICD-10-PCS | Performed by: RADIOLOGY

## 2018-11-07 PROCEDURE — 02HV33Z INSERTION OF INFUSION DEVICE INTO SUPERIOR VENA CAVA, PERCUTANEOUS APPROACH: ICD-10-PCS | Performed by: RADIOLOGY

## 2018-11-07 PROCEDURE — 77001 FLUOROGUIDE FOR VEIN DEVICE: CPT

## 2018-11-07 PROCEDURE — 76937 US GUIDE VASCULAR ACCESS: CPT

## 2018-11-07 PROCEDURE — 36561 INSERT TUNNELED CV CATH: CPT

## 2018-11-07 PROCEDURE — 99153 MOD SED SAME PHYS/QHP EA: CPT

## 2018-11-07 RX ORDER — MIDAZOLAM HYDROCHLORIDE 1 MG/ML
INJECTION INTRAMUSCULAR; INTRAVENOUS
Status: COMPLETED
Start: 2018-11-07 | End: 2018-11-07

## 2018-11-07 RX ORDER — HEPARIN SODIUM 5000 [USP'U]/ML
INJECTION, SOLUTION INTRAVENOUS; SUBCUTANEOUS
Status: COMPLETED
Start: 2018-11-07 | End: 2018-11-07

## 2018-11-07 RX ORDER — BACITRACIN 50000 [USP'U]/1
INJECTION, POWDER, LYOPHILIZED, FOR SOLUTION INTRAMUSCULAR
Status: COMPLETED
Start: 2018-11-07 | End: 2018-11-07

## 2018-11-07 RX ORDER — SODIUM CHLORIDE 9 MG/ML
INJECTION, SOLUTION INTRAVENOUS CONTINUOUS
Status: DISCONTINUED | OUTPATIENT
Start: 2018-11-07 | End: 2018-11-07

## 2018-11-07 RX ORDER — LIDOCAINE HYDROCHLORIDE 10 MG/ML
INJECTION, SOLUTION INFILTRATION; PERINEURAL
Status: COMPLETED
Start: 2018-11-07 | End: 2018-11-07

## 2018-11-07 RX ORDER — CEFAZOLIN SODIUM/WATER 2 G/20 ML
SYRINGE (ML) INTRAVENOUS
Status: COMPLETED
Start: 2018-11-07 | End: 2018-11-07

## 2018-11-07 RX ADMIN — SODIUM CHLORIDE: 9 INJECTION, SOLUTION INTRAVENOUS at 13:30:00

## 2018-11-07 NOTE — PROCEDURES
[facility]  Procedure Note    Beatrice Mindykaylin Platt Patient Status:  Outpatient in a Bed    1965 MRN [id number]   Location [facility] Attending Asha Lamar MD   Hosp Day # 0 PCP Jonn Elizabeth MD     Procedure: right

## 2018-11-07 NOTE — PROGRESS NOTES
S/p PAC. Pt A/Ox4. ANDERSON. HOB elevated. Right chest incision with mepilex dressing, CDI. Denies any pain. VSS. Voiding without difficulty. [place] bedrest at [id number], ambulated in unit. Discharge instructions given, pt verbalized understanding. PIV d/c'd.  Pt disch

## 2018-11-07 NOTE — PRE-SEDATION ASSESSMENT
[facility]  IR Pre-Procedure Sedation Assessment    History of snoring or sleep or apnea?    No    History of previous problems with anesthesia or sedation  No    Physical Findings:  Neck: nl ROM  CV: nl  PULM: normal respiratory rate/effort    [place]

## 2018-11-07 NOTE — PROGRESS NOTES
NUTRITION SCREEN: Nutrition screen complete as triggered by Best Practice for dx of  metastatic colon cancer. Chart reviewed. Pt appears at a moderate nutrition risk as per stage IV cancer and beginning chemotherapy.  RD available on consult pending MD disc

## 2018-11-09 ENCOUNTER — OFFICE VISIT (OUTPATIENT)
Dept: HEMATOLOGY/ONCOLOGY | Facility: HOSPITAL | Age: 53
End: 2018-11-09
Attending: NURSE PRACTITIONER
Payer: COMMERCIAL

## 2018-11-09 VITALS
DIASTOLIC BLOOD PRESSURE: 78 MMHG | SYSTOLIC BLOOD PRESSURE: 124 MMHG | OXYGEN SATURATION: 100 % | HEIGHT: 76.34 IN | WEIGHT: 236 LBS | HEART RATE: 77 BPM | BODY MASS INDEX: 28.44 KG/M2 | TEMPERATURE: 98 F | RESPIRATION RATE: 16 BRPM

## 2018-11-09 DIAGNOSIS — C20 RECTAL CANCER (HCC): ICD-10-CM

## 2018-11-09 DIAGNOSIS — C78.01 SECONDARY CARCINOMA OF RIGHT LUNG (HCC): ICD-10-CM

## 2018-11-09 DIAGNOSIS — C78.02 SECONDARY ADENOCARCINOMA OF LEFT LUNG (HCC): ICD-10-CM

## 2018-11-09 DIAGNOSIS — Z71.9 ENCOUNTER FOR EDUCATION: ICD-10-CM

## 2018-11-09 DIAGNOSIS — C78.7 SECONDARY LIVER CANCER (HCC): Primary | ICD-10-CM

## 2018-11-09 PROCEDURE — 96417 CHEMO IV INFUS EACH ADDL SEQ: CPT

## 2018-11-09 PROCEDURE — 96375 TX/PRO/DX INJ NEW DRUG ADDON: CPT

## 2018-11-09 PROCEDURE — 80053 COMPREHEN METABOLIC PANEL: CPT

## 2018-11-09 PROCEDURE — 96415 CHEMO IV INFUSION ADDL HR: CPT

## 2018-11-09 PROCEDURE — 82378 CARCINOEMBRYONIC ANTIGEN: CPT

## 2018-11-09 PROCEDURE — 96411 CHEMO IV PUSH ADDL DRUG: CPT

## 2018-11-09 PROCEDURE — 96413 CHEMO IV INFUSION 1 HR: CPT

## 2018-11-09 PROCEDURE — 99215 OFFICE O/P EST HI 40 MIN: CPT | Performed by: CLINICAL NURSE SPECIALIST

## 2018-11-09 PROCEDURE — 96368 THER/DIAG CONCURRENT INF: CPT

## 2018-11-09 PROCEDURE — 85025 COMPLETE CBC W/AUTO DIFF WBC: CPT

## 2018-11-09 RX ORDER — FLUOROURACIL 50 MG/ML
2400 INJECTION, SOLUTION INTRAVENOUS CONTINUOUS
Status: DISCONTINUED | OUTPATIENT
Start: 2018-11-09 | End: 2018-11-09

## 2018-11-09 RX ORDER — SODIUM CHLORIDE 0.9 % (FLUSH) 0.9 %
10 SYRINGE (ML) INJECTION ONCE
Status: CANCELLED | OUTPATIENT
Start: 2018-11-09

## 2018-11-09 RX ORDER — FLUOROURACIL 50 MG/ML
400 INJECTION, SOLUTION INTRAVENOUS ONCE
Status: COMPLETED | OUTPATIENT
Start: 2018-11-09 | End: 2018-11-09

## 2018-11-09 RX ADMIN — FLUOROURACIL 950 MG: 50 INJECTION, SOLUTION INTRAVENOUS at 14:01:00

## 2018-11-09 RX ADMIN — FLUOROURACIL 5800 MG: 50 INJECTION, SOLUTION INTRAVENOUS at 14:10:00

## 2018-11-09 NOTE — PROGRESS NOTES
Pt here for C1D1.   Arrives Ambulating independently, accompanied by Spouse           Modifications in dose or schedule: No.     Frequency of blood return and site check throughout administration: Prior to administration and At completion of therapy   Disch

## 2018-11-09 NOTE — PROGRESS NOTES
Chemotherapy Education    Learner:  Patient and Spouse    Barriers / Limitations:  None    Chemotherapy education goals:  · Learn the drug names  · Administration schedule  · Routes of administration  · Treatment setting    Drug names:  FOLFOX + Avastin : menstrual irregularity and vaginal dryness:  N/A    Notify MD/RN of any changes or problems:  Achieved    Comments:    Treatment Effects on Emotional Status:    Potential mood changes, depression, nervousness, difficulty sleeping:  Achieved    Importance o

## 2018-11-11 ENCOUNTER — NURSE ONLY (OUTPATIENT)
Dept: HEMATOLOGY/ONCOLOGY | Facility: HOSPITAL | Age: 53
End: 2018-11-11
Attending: NURSE PRACTITIONER
Payer: COMMERCIAL

## 2018-11-11 PROCEDURE — 96523 IRRIG DRUG DELIVERY DEVICE: CPT

## 2018-11-11 NOTE — PROGRESS NOTES
Date of Treatment: 11/9/18                                Type of Chemo: Avastin/FOLFOX    Comments: Pt feeling well; reports cold sensitivity to hands and forgot to wear gloves this morning.      Recommendations: reviewed cold precautions and instructed pt

## 2018-11-12 ENCOUNTER — TELEPHONE (OUTPATIENT)
Dept: HEMATOLOGY/ONCOLOGY | Facility: HOSPITAL | Age: 53
End: 2018-11-12

## 2018-11-12 RX ORDER — ONDANSETRON 8 MG/1
8 TABLET, ORALLY DISINTEGRATING ORAL EVERY 8 HOURS PRN
Qty: 30 TABLET | Refills: 0 | Status: SHIPPED | OUTPATIENT
Start: 2018-11-12 | End: 2020-02-20

## 2018-11-12 RX ORDER — PROCHLORPERAZINE MALEATE 10 MG
10 TABLET ORAL EVERY 6 HOURS PRN
Qty: 30 TABLET | Refills: 3 | Status: SHIPPED | OUTPATIENT
Start: 2018-11-12 | End: 2020-02-20

## 2018-11-12 NOTE — TELEPHONE ENCOUNTER
Pt states he does not have any medication for nausea. Scripts sent to patient's pharmacy. Pt informed.

## 2018-11-21 NOTE — PROGRESS NOTES
Cancer Center Progress Note    Patient Name: Jose Martin Zamudio   YOB: 1965   Medical Record Number: [id number]   CSN: [id number]   Date of visit: 11/23/2018   Provider: JOHN Foley  Referring Physician: No ref.  provider found    Prob Vitamins Oral Tab, Take 1 tablet by mouth daily. , Disp: , Rfl:     Review of Systems:   As in HPI    Physical Examination:  General: Awake, alert, oriented x3, no acute distress.     HEENT:  Anicteric, conjunctivae and sclerae clear, no sinus tenderness, no fL    Neutrophil Absolute Prelim 2.85 1.30 - 6.70 x10 (3) uL    Neutrophil Absolute 2.85 1.30 - 6.70 x10(3) uL    Lymphocyte Absolute 1.35 0.90 - 4.00 x10(3) uL    Monocyte Absolute 0.44 0.10 - 1.00 x10(3) uL    Eosinophil Absolute 0.27 0.00 - 0.30 x10(3)

## 2018-11-23 ENCOUNTER — OFFICE VISIT (OUTPATIENT)
Dept: HEMATOLOGY/ONCOLOGY | Facility: HOSPITAL | Age: 53
End: 2018-11-23
Attending: NURSE PRACTITIONER
Payer: COMMERCIAL

## 2018-11-23 VITALS
BODY MASS INDEX: 27 KG/M2 | SYSTOLIC BLOOD PRESSURE: 112 MMHG | WEIGHT: 227.81 LBS | HEART RATE: 83 BPM | TEMPERATURE: 98 F | DIASTOLIC BLOOD PRESSURE: 66 MMHG | RESPIRATION RATE: 18 BRPM | OXYGEN SATURATION: 99 %

## 2018-11-23 VITALS — WEIGHT: 227.81 LBS | BODY MASS INDEX: 27.46 KG/M2 | HEIGHT: 76.34 IN

## 2018-11-23 DIAGNOSIS — T45.1X5A CHEMOTHERAPY INDUCED NAUSEA AND VOMITING: ICD-10-CM

## 2018-11-23 DIAGNOSIS — R11.2 CHEMOTHERAPY INDUCED NAUSEA AND VOMITING: ICD-10-CM

## 2018-11-23 DIAGNOSIS — C20 RECTAL CANCER (HCC): ICD-10-CM

## 2018-11-23 DIAGNOSIS — C78.01 SECONDARY CARCINOMA OF RIGHT LUNG (HCC): ICD-10-CM

## 2018-11-23 DIAGNOSIS — C78.02 SECONDARY ADENOCARCINOMA OF LEFT LUNG (HCC): ICD-10-CM

## 2018-11-23 DIAGNOSIS — C18.6 CANCER OF DESCENDING COLON (HCC): Primary | ICD-10-CM

## 2018-11-23 DIAGNOSIS — C78.7 SECONDARY LIVER CANCER (HCC): ICD-10-CM

## 2018-11-23 DIAGNOSIS — C78.7 SECONDARY LIVER CANCER (HCC): Primary | ICD-10-CM

## 2018-11-23 PROCEDURE — 96375 TX/PRO/DX INJ NEW DRUG ADDON: CPT

## 2018-11-23 PROCEDURE — 96411 CHEMO IV PUSH ADDL DRUG: CPT

## 2018-11-23 PROCEDURE — 82378 CARCINOEMBRYONIC ANTIGEN: CPT

## 2018-11-23 PROCEDURE — 96415 CHEMO IV INFUSION ADDL HR: CPT

## 2018-11-23 PROCEDURE — 96368 THER/DIAG CONCURRENT INF: CPT

## 2018-11-23 PROCEDURE — 85025 COMPLETE CBC W/AUTO DIFF WBC: CPT

## 2018-11-23 PROCEDURE — 96417 CHEMO IV INFUS EACH ADDL SEQ: CPT

## 2018-11-23 PROCEDURE — 96413 CHEMO IV INFUSION 1 HR: CPT

## 2018-11-23 PROCEDURE — 99214 OFFICE O/P EST MOD 30 MIN: CPT | Performed by: CLINICAL NURSE SPECIALIST

## 2018-11-23 PROCEDURE — 80053 COMPREHEN METABOLIC PANEL: CPT

## 2018-11-23 RX ORDER — FLUOROURACIL 50 MG/ML
2400 INJECTION, SOLUTION INTRAVENOUS CONTINUOUS
Status: DISCONTINUED | OUTPATIENT
Start: 2018-11-23 | End: 2018-11-23

## 2018-11-23 RX ORDER — METOCLOPRAMIDE HYDROCHLORIDE 5 MG/ML
10 INJECTION INTRAMUSCULAR; INTRAVENOUS EVERY 6 HOURS PRN
Status: CANCELLED | OUTPATIENT
Start: 2018-11-23

## 2018-11-23 RX ORDER — FLUOROURACIL 50 MG/ML
400 INJECTION, SOLUTION INTRAVENOUS ONCE
Status: COMPLETED | OUTPATIENT
Start: 2018-11-23 | End: 2018-11-23

## 2018-11-23 RX ORDER — FLUOROURACIL 50 MG/ML
2400 INJECTION, SOLUTION INTRAVENOUS CONTINUOUS
Status: CANCELLED | OUTPATIENT
Start: 2018-11-23

## 2018-11-23 RX ORDER — ONDANSETRON 2 MG/ML
8 INJECTION INTRAMUSCULAR; INTRAVENOUS EVERY 6 HOURS PRN
Status: CANCELLED | OUTPATIENT
Start: 2018-11-23

## 2018-11-23 RX ORDER — LORAZEPAM 2 MG/ML
INJECTION INTRAMUSCULAR EVERY 4 HOURS PRN
Status: CANCELLED | OUTPATIENT
Start: 2018-11-23

## 2018-11-23 RX ORDER — FLUOROURACIL 50 MG/ML
400 INJECTION, SOLUTION INTRAVENOUS ONCE
Status: CANCELLED | OUTPATIENT
Start: 2018-11-23

## 2018-11-23 RX ORDER — PROCHLORPERAZINE MALEATE 10 MG
10 TABLET ORAL EVERY 6 HOURS PRN
Status: CANCELLED | OUTPATIENT
Start: 2018-11-23

## 2018-11-23 RX ORDER — LORAZEPAM 0.5 MG/1
TABLET ORAL EVERY 4 HOURS PRN
Status: CANCELLED | OUTPATIENT
Start: 2018-11-23

## 2018-11-23 RX ADMIN — FLUOROURACIL 5800 MG: 50 INJECTION, SOLUTION INTRAVENOUS at 13:42:00

## 2018-11-23 RX ADMIN — FLUOROURACIL 950 MG: 50 INJECTION, SOLUTION INTRAVENOUS at 13:34:00

## 2018-11-23 NOTE — PROGRESS NOTES
Pt here for C2D1.   Arrives Ambulating independently, accompanied by self      Modifications in dose or schedule: No     Frequency of blood return and site check throughout administration: Prior to administration and At completion of therapy   Discharged to

## 2018-11-23 NOTE — PROGRESS NOTES
Patient presents with: Follow - Up: APN assessment prior to treatment    Pt is here for treatment; C2 D1 Folfox 6 is expected.  Pt is overall doing well; he relates one episode of vomiting/diahrrea that he believes to be related to diet; otherwise denies N

## 2018-11-24 ENCOUNTER — HOSPITAL ENCOUNTER (OUTPATIENT)
Facility: HOSPITAL | Age: 53
Setting detail: OBSERVATION
Discharge: HOME | DRG: 389 | End: 2018-11-25
Attending: EMERGENCY MEDICINE | Admitting: HOSPITALIST
Payer: COMMERCIAL

## 2018-11-24 ENCOUNTER — APPOINTMENT (OUTPATIENT)
Dept: CT IMAGING | Facility: HOSPITAL | Age: 53
DRG: 389 | End: 2018-11-24
Attending: EMERGENCY MEDICINE
Payer: COMMERCIAL

## 2018-11-24 DIAGNOSIS — K56.609 SBO (SMALL BOWEL OBSTRUCTION) (HCC): Primary | ICD-10-CM

## 2018-11-24 PROCEDURE — 74177 CT ABD & PELVIS W/CONTRAST: CPT | Performed by: EMERGENCY MEDICINE

## 2018-11-24 RX ORDER — ONDANSETRON 2 MG/ML
4 INJECTION INTRAMUSCULAR; INTRAVENOUS ONCE
Status: COMPLETED | OUTPATIENT
Start: 2018-11-24 | End: 2018-11-24

## 2018-11-25 VITALS
DIASTOLIC BLOOD PRESSURE: 76 MMHG | BODY MASS INDEX: 27.11 KG/M2 | OXYGEN SATURATION: 100 % | HEART RATE: 65 BPM | RESPIRATION RATE: 18 BRPM | SYSTOLIC BLOOD PRESSURE: 116 MMHG | TEMPERATURE: 98 F | HEIGHT: 77 IN | WEIGHT: 229.63 LBS

## 2018-11-25 PROCEDURE — 99254 IP/OBS CNSLTJ NEW/EST MOD 60: CPT | Performed by: INTERNAL MEDICINE

## 2018-11-25 PROCEDURE — 99223 1ST HOSP IP/OBS HIGH 75: CPT | Performed by: HOSPITALIST

## 2018-11-25 RX ORDER — DEXTROSE AND SODIUM CHLORIDE 5; .45 G/100ML; G/100ML
INJECTION, SOLUTION INTRAVENOUS CONTINUOUS
Status: ACTIVE | OUTPATIENT
Start: 2018-11-25 | End: 2018-11-25

## 2018-11-25 RX ORDER — MORPHINE SULFATE 4 MG/ML
1 INJECTION, SOLUTION INTRAMUSCULAR; INTRAVENOUS EVERY 2 HOUR PRN
Status: DISCONTINUED | OUTPATIENT
Start: 2018-11-25 | End: 2018-11-25

## 2018-11-25 RX ORDER — METOCLOPRAMIDE HYDROCHLORIDE 5 MG/ML
10 INJECTION INTRAMUSCULAR; INTRAVENOUS EVERY 8 HOURS PRN
Status: DISCONTINUED | OUTPATIENT
Start: 2018-11-25 | End: 2018-11-25

## 2018-11-25 RX ORDER — ENOXAPARIN SODIUM 100 MG/ML
40 INJECTION SUBCUTANEOUS DAILY
Status: DISCONTINUED | OUTPATIENT
Start: 2018-11-25 | End: 2018-11-25

## 2018-11-25 RX ORDER — ONDANSETRON 2 MG/ML
4 INJECTION INTRAMUSCULAR; INTRAVENOUS EVERY 6 HOURS PRN
Status: DISCONTINUED | OUTPATIENT
Start: 2018-11-25 | End: 2018-11-25

## 2018-11-25 RX ORDER — SODIUM CHLORIDE 9 MG/ML
INJECTION, SOLUTION INTRAVENOUS CONTINUOUS
Status: DISCONTINUED | OUTPATIENT
Start: 2018-11-25 | End: 2018-11-25

## 2018-11-25 RX ORDER — MORPHINE SULFATE 4 MG/ML
2 INJECTION, SOLUTION INTRAMUSCULAR; INTRAVENOUS EVERY 2 HOUR PRN
Status: DISCONTINUED | OUTPATIENT
Start: 2018-11-25 | End: 2018-11-25

## 2018-11-25 NOTE — CONSULTS
[facility]  Report of  Surgical Consultation with History and Physical Exam    Miranda Taareli Patient Status:  Inpatient    1965 MRN [id number]   [facility] 4NW-A Attending Monika Serrano MD   Hosp Day # 0 PCP Supa Prescott MD around my anastomotic site and the site of the most significant surgical intervention.   This is a focal enteritis and is concerning for either adhesions, or inflammation around the previous anastomotic site within the connection between the proximal descen reports that  has never smoked. he has never used smokeless tobacco. He reports that he drinks about 4.2 oz of alcohol per week. He reports that he does not use drugs.     Allergies:  No Known Allergies    Medications:    Current Facility-Administered Liver and spleen are not palpable. There are no inguinal, umbilical, or incisional hernias present. He has a well-functioning stoma in the right mid abdomen with pink and viable small bowel emanating from the abdominal wall.     Extremities:  No lower ext He is undergoing chemotherapy. He is tolerating liquids, he will be advanced to a soft diet. As long as he tolerates soft diet he can be discharged from the hospital from a surgical standpoint.   2. This patient had an appointment with me on Thursday of t

## 2018-11-25 NOTE — ED PROVIDER NOTES
Patient Seen in: [facility] Emergency Department    History   Patient presents with:  Nausea/Vomiting/Diarrhea (gastrointestinal)    Stated Complaint: +chemo vomiting    HPI    57-year-old white male who presents the emergency room today for complai 2026 None (Room air)       Current:/89   Pulse 79   Temp 98 °F (36.7 °C) (Oral)   Resp 16   Ht 195.6 cm (6' 5\")   Wt 104.3 kg   SpO2 96%   BMI 27.27 kg/m²         Physical Exam    Well-developed well-nourished male who is sitting on the gurney he is URINALYSIS WITH CULTURE REFLEX   RAINBOW DRAW BLUE   RAINBOW DRAW LAVENDER   RAINBOW DRAW LIGHT GREEN   RAINBOW DRAW GOLD          CT scan of the abdomen pelvis reveals:    FINDINGS:    LUNG BASE:  Stable pulmonary nodules in the visualized lung bases. ostomy unchanged.     Stable mild wall thickening of the rectal stump. MDM   Patient was given IV fluids and antiemetics and had laboratory studies sent.   Laboratory workup only shows some minor electrolyte changes with a potassium of 3.5 blood s

## 2018-11-25 NOTE — CONSULTS
Hematology-Oncology Consultation Note    Patient Name: Nicole Yousif   YOB: 1965   Medical Record Number: [id number]   CSN: [id number]   Consulting Physician: Sandi Alfonso MD  Date of Consultation: 11/25/2018     Reason for Consultation: Relation Age of Onset   • Diabetes Father         borderline   • Lipids Father    • Obesity Father    • Lipids Mother    • Obesity Mother        Psychosocial History:  Social History    Socioeconomic History      Marital status:       Spouse name: N mg in Sodium Chloride 0.9 % 10 mL IV push 40 mg Intravenous Q12H   [COMPLETED] sodium chloride 0.9% IV bolus 1,000 mL 1,000 mL Intravenous Once   [COMPLETED] ondansetron HCl (ZOFRAN) injection 4 mg 4 mg Intravenous Once   [COMPLETED] Diatrizoate Meglumine Neutrophil Absolute Prelim 8.07 (H) 11/24/2018 2035    Neutrophil Absolute Prelim 2.85 11/23/2018 0909    WBC 6.6 11/25/2018 0742    WBC 9.0 11/24/2018 2035    WBC 5.0 11/23/2018 0909    .0 11/25/2018 0742    .0 11/24/2018 2035    . Total Protein 7.4 11/09/2018 0916           Radiology:  PROCEDURE:  CT ABDOMEN+PELVIS (CONTRAST ONLY) (CPT=74177)     COMPARISON:  [facility], CT CHEST+ABDOMEN+PELVIS(ALL CNTRST ONLY)(CPT=71260/16355), 11/03/2018, 7:24.      INDICATIONS:  +chemo vomiting upper pelvis consistent with enteritis. There is stable mild wall thickening of the   rectal stump.   PELVIC   ORGANS:  Small amount of free fluid in the pelvis and right lower quadrant tracking along the right pericolic gutter   BONES:  Stable.      =====

## 2018-11-25 NOTE — PLAN OF CARE
Pt is alert and oriented x 4. Vitals stable. No C/o pain or discomfort. Pt has good output through Ileostomy with light brown color watery stool with active bowel sounds. Started on Soft diet. Pt tolerated well.  OK to discharge per Eve Cuellar and Gloria Rascon

## 2018-11-25 NOTE — ED INITIAL ASSESSMENT (HPI)
Pt c/o lower abdominal pain, nausea and vomiting since 1500 today. Pt is currently on a chemo pump.  Pt denies any fevers

## 2018-11-25 NOTE — DISCHARGE SUMMARY
[facility] [place] HOSPITALIST  DISCHARGE SUMMARY     Mateo Platt Patient Status:  Inpatient    1965 MRN [id number]   [facility] 4NW-A Attending Marky Lynn MD   Hosp Day # 0 PCP Adryan Villatoro MD     Date of Admission: 2018  Date of 0     Prochlorperazine Maleate 10 mg tablet  Commonly known as:  COMPAZINE      Take 1 tablet (10 mg total) by mouth every 6 (six) hours as needed for Nausea.    Quantity:  30 tablet  Refills:  3            [place]    Follow-up appointment:   Verito

## 2018-11-25 NOTE — H&P
[facility] HOSPITALIST  History and Physical     Jesus Iris Patient Status:  Inpatient    1965 MRN [id number]   [facility] 4[facility]A Attending Kaylah Tijerina MD   Hosp Day # 0 PCP Gadiel Billings MD     Chief Complaint: Abd pain, Nausea medications on file prior to encounter. Current Outpatient Medications on File Prior to Encounter:  ondansetron 8 MG Oral Tablet Dispersible Take 1 tablet (8 mg total) by mouth every 8 (eight) hours as needed for Nausea.  Disp: 30 tablet Rfl: 0   Elyssa 105   CO2  26.0  23.0   ALKPHO  71  70   AST  20  32   ALT  27  36   BILT  0.5  0.4   TP  7.5  7.7       Estimated Creatinine Clearance: 85.4 mL/min (based on SCr of 1.29 mg/dL). No results for input(s): PTP, INR in the last 168 hours.     No results for

## 2018-11-25 NOTE — PROGRESS NOTES
Pt aox4. Transported via cart,oriented to room,call light in reach. NPO excepts sips with meds and sips with clliq. Jennifer Engman 0.9ns infusing at 100cc/hr via rt upper arm peripheral iv.48hr flourouracil continues infusion via PAC. Pt said it will be finish at 10am.Abdom

## 2018-11-25 NOTE — PROGRESS NOTES
Brief Note:  Chart reviewed  Noted dark output from ostomy, melena?     Plan:  NPO, IVF  Check CBC  Send for FOBT  Add PPI BID  Hold Lovenox  Discussed with RN  PTacarroll MOREJON

## 2018-11-25 NOTE — PLAN OF CARE
NURSING DISCHARGE NOTE    Discharged Home via Ambulatory. Accompanied by RN  Belongings Taken by patient/family.

## 2018-11-28 ENCOUNTER — NURSE ONLY (OUTPATIENT)
Dept: HEMATOLOGY/ONCOLOGY | Facility: HOSPITAL | Age: 53
End: 2018-11-28

## 2018-11-30 NOTE — PAYOR COMM NOTE
--------------  DISCHARGE REVIEW    Payor: [employer] OUT OF STATE PPO  Subscriber #:  [id number]  Authorization Number: N/A    Admit date: N/A  Admit time:  N/A  Discharge Date: 11/25/2018 12:15 PM     PROCESSING AS OBSERVATION. THANK YOU.     Admitting Ph enteritis. Patient was admitted with surgery and oncology on consult. Patient with rapid resolution and clinical improvement. Diet started, tolerated. Home today. Lace+ Score: 59  59-90 High Risk  29-58 Medium Risk  0-28   Low Risk.     TCM Follow-Up Rec 9:00 AM  ON TREATMENT VISIT FOLLOW-UP [2589] 15 min. [facility] in [facility] MD Rich    Location Instructions: Your appointment is scheduled at the [facility] in [place].  The address is [address] ,

## 2018-11-30 NOTE — PAYOR COMM NOTE
--------------  ADMISSION REVIEW     Payor: [employer] OUT OF STATE PPO  Subscriber #:  [id number]  Authorization Number: N/A    Admit date: 11/25/18  Admit time: 0040       Admitting Physician: Wilfrid Gibbons MD  Attending Physician:  Verito att. providers fo Smokeless tobacco: Never Used    Alcohol use:  Yes      Alcohol/week: 4.2 oz      Types: 7 Cans of beer per week      Frequency: Never      Comment: drinks 1 beer every night    Drug use: No      Review of Systems    Positive for stated complaint: +chemo vo following components:    Neutrophil Absolute Prelim 8.07 (*)     Neutrophil Absolute 8.07 (*)     Lymphocyte Absolute 0.36 (*)     All other components within normal limits   CBC WITH DIFFERENTIAL WITH PLATELET    Narrative:      The following orders were c and upper pelvis consistent with enteritis.  There is stable mild wall thickening of the   rectal stump.  PELVIC   ORGANS:  Small amount of free fluid in the pelvis and right lower quadrant tracking along the right pericolic gutter   BONES:  Stable.     diagnosis)    Disposition:  Admit  11/24/2018 11:37 pm    Follow-up:  No follow-up provider specified.       Medications Prescribed:  Current Discharge Medication List        Present on Admission  Date Reviewed: 11/21/2018          ICD-10-CM Noted POA    SB COLECTOMY  10/12/2018   • COLONOSCOPY  08/09/2018   • TONSILLECTOMY     • WISDOM TEETH REMOVED     • XI ROBOT-ASSISTED LAPAROSCOPIC LOW ANTERIOR COLON RESECTION N/A 10/12/2018    Performed by Carol Harvey MD at [address] [place] [address] History:  reports t hypoactive L . No rebound, guarding or organomegaly. + ostomy output for gas and stool   Neurologic: No focal neurological deficits. CNII-XII grossly intact. Musculoskeletal: Moves all extremities. Extremities: No edema or cyanosis.   Integument: No rashe be discharged from the hospital from a surgical standpoint. 2. This patient had an appointment with me on Thursday of this week.   He should keep the time open for that appointment, but can cancel the day of the appointment since I have fully examined him

## 2018-12-06 ENCOUNTER — OFFICE VISIT (OUTPATIENT)
Dept: HEMATOLOGY/ONCOLOGY | Age: 53
End: 2018-12-06
Attending: NURSE PRACTITIONER
Payer: COMMERCIAL

## 2018-12-06 VITALS
TEMPERATURE: 97 F | RESPIRATION RATE: 16 BRPM | SYSTOLIC BLOOD PRESSURE: 122 MMHG | BODY MASS INDEX: 28 KG/M2 | DIASTOLIC BLOOD PRESSURE: 76 MMHG | OXYGEN SATURATION: 100 % | WEIGHT: 233 LBS | HEART RATE: 73 BPM

## 2018-12-06 DIAGNOSIS — C78.01 SECONDARY CARCINOMA OF RIGHT LUNG (HCC): ICD-10-CM

## 2018-12-06 DIAGNOSIS — C20 RECTAL CANCER (HCC): ICD-10-CM

## 2018-12-06 DIAGNOSIS — C78.7 SECONDARY LIVER CANCER (HCC): ICD-10-CM

## 2018-12-06 DIAGNOSIS — C78.7 SECONDARY LIVER CANCER (HCC): Primary | ICD-10-CM

## 2018-12-06 DIAGNOSIS — D50.0 IRON DEFICIENCY ANEMIA DUE TO CHRONIC BLOOD LOSS: ICD-10-CM

## 2018-12-06 DIAGNOSIS — C78.02 SECONDARY ADENOCARCINOMA OF LEFT LUNG (HCC): ICD-10-CM

## 2018-12-06 DIAGNOSIS — D50.0 IRON DEFICIENCY ANEMIA DUE TO CHRONIC BLOOD LOSS: Primary | ICD-10-CM

## 2018-12-06 PROBLEM — D64.9 ANEMIA, UNSPECIFIED: Status: ACTIVE | Noted: 2018-12-06

## 2018-12-06 PROCEDURE — 96375 TX/PRO/DX INJ NEW DRUG ADDON: CPT

## 2018-12-06 PROCEDURE — 82728 ASSAY OF FERRITIN: CPT

## 2018-12-06 PROCEDURE — 83550 IRON BINDING TEST: CPT

## 2018-12-06 PROCEDURE — 96411 CHEMO IV PUSH ADDL DRUG: CPT

## 2018-12-06 PROCEDURE — 96413 CHEMO IV INFUSION 1 HR: CPT

## 2018-12-06 PROCEDURE — 96417 CHEMO IV INFUS EACH ADDL SEQ: CPT

## 2018-12-06 PROCEDURE — 80053 COMPREHEN METABOLIC PANEL: CPT

## 2018-12-06 PROCEDURE — 82378 CARCINOEMBRYONIC ANTIGEN: CPT

## 2018-12-06 PROCEDURE — 85025 COMPLETE CBC W/AUTO DIFF WBC: CPT

## 2018-12-06 PROCEDURE — 99214 OFFICE O/P EST MOD 30 MIN: CPT | Performed by: INTERNAL MEDICINE

## 2018-12-06 PROCEDURE — 83540 ASSAY OF IRON: CPT

## 2018-12-06 PROCEDURE — 96368 THER/DIAG CONCURRENT INF: CPT

## 2018-12-06 PROCEDURE — 96415 CHEMO IV INFUSION ADDL HR: CPT

## 2018-12-06 RX ORDER — FLUOROURACIL 50 MG/ML
2400 INJECTION, SOLUTION INTRAVENOUS CONTINUOUS
Status: DISCONTINUED | OUTPATIENT
Start: 2018-12-06 | End: 2018-12-06

## 2018-12-06 RX ORDER — PROCHLORPERAZINE MALEATE 10 MG
10 TABLET ORAL EVERY 6 HOURS PRN
Status: CANCELLED | OUTPATIENT
Start: 2018-12-07

## 2018-12-06 RX ORDER — FLUOROURACIL 50 MG/ML
2400 INJECTION, SOLUTION INTRAVENOUS CONTINUOUS
Status: CANCELLED | OUTPATIENT
Start: 2018-12-07

## 2018-12-06 RX ORDER — FLUOROURACIL 50 MG/ML
400 INJECTION, SOLUTION INTRAVENOUS ONCE
Status: COMPLETED | OUTPATIENT
Start: 2018-12-06 | End: 2018-12-06

## 2018-12-06 RX ORDER — ONDANSETRON 2 MG/ML
8 INJECTION INTRAMUSCULAR; INTRAVENOUS EVERY 6 HOURS PRN
Status: CANCELLED | OUTPATIENT
Start: 2018-12-07

## 2018-12-06 RX ORDER — METOCLOPRAMIDE HYDROCHLORIDE 5 MG/ML
10 INJECTION INTRAMUSCULAR; INTRAVENOUS EVERY 6 HOURS PRN
Status: CANCELLED | OUTPATIENT
Start: 2018-12-07

## 2018-12-06 RX ORDER — LORAZEPAM 0.5 MG/1
TABLET ORAL EVERY 4 HOURS PRN
Status: CANCELLED | OUTPATIENT
Start: 2018-12-07

## 2018-12-06 RX ORDER — LORAZEPAM 2 MG/ML
INJECTION INTRAMUSCULAR EVERY 4 HOURS PRN
Status: CANCELLED | OUTPATIENT
Start: 2018-12-07

## 2018-12-06 RX ORDER — FLUOROURACIL 50 MG/ML
400 INJECTION, SOLUTION INTRAVENOUS ONCE
Status: CANCELLED | OUTPATIENT
Start: 2018-12-07

## 2018-12-06 RX ADMIN — FLUOROURACIL 950 MG: 50 INJECTION, SOLUTION INTRAVENOUS at 13:07:00

## 2018-12-06 RX ADMIN — FLUOROURACIL 5800 MG: 50 INJECTION, SOLUTION INTRAVENOUS at 13:12:00

## 2018-12-06 NOTE — PROGRESS NOTES
Pt here for C3D1.   Arrives Ambulating independently,          Modifications in dose or schedule: No     Frequency of blood return and site check throughout administration: Prior to administration, Every 2-3 ml IVP and At completion of therapy   Discharged

## 2018-12-06 NOTE — PROGRESS NOTES
Patient is here for MD tan/cristóbal and cycle 3 of chemo. Patient developed a bowel obstruction a few days after cycle 2. Patient states he is moving his bowels via ileostomy. Appetite is good. No nausea or vomiting. Cold sensitivity in fingers lasting a few days.

## 2018-12-06 NOTE — PROGRESS NOTES
[address]    PATIENT'S NAME: Chandrikalinda Olszewski   ATTENDING PHYSICIAN: Shabana Bowen M.D.    PATIENT ACCOUNT #: [de-identified] LOCATION: [address] [place] [address] RECORD #: [id number] YOB: 1965   DATE OF SERVICE: 12/06/2018       CANCER ROSA Performance status is 0. Weight 233 pounds, which is up. Blood pressure 122/76, pulse 73, respiratory rate 20, temperature 97.4. HEENT:  Unremarkable. He has pink conjunctivae, anicteric sclerae. Pharynx without lesions.   LYMPHATICS:  No cervical, sup

## 2018-12-07 ENCOUNTER — SOCIAL WORK SERVICES (OUTPATIENT)
Dept: HEMATOLOGY/ONCOLOGY | Facility: HOSPITAL | Age: 53
End: 2018-12-07

## 2018-12-07 NOTE — PROGRESS NOTES
SW met with patient and provided contact information on 12/6. Patient is able to work from home and was working during his chemo. Patient had [place] paperwork and is requesting intermittent time off.    So far patient states he is feeling fine and is hopefu

## 2018-12-08 ENCOUNTER — NURSE ONLY (OUTPATIENT)
Dept: HEMATOLOGY/ONCOLOGY | Facility: HOSPITAL | Age: 53
End: 2018-12-08
Attending: NURSE PRACTITIONER
Payer: COMMERCIAL

## 2018-12-08 PROCEDURE — 96523 IRRIG DRUG DELIVERY DEVICE: CPT

## 2018-12-20 ENCOUNTER — OFFICE VISIT (OUTPATIENT)
Dept: HEMATOLOGY/ONCOLOGY | Age: 53
End: 2018-12-20
Attending: NURSE PRACTITIONER
Payer: COMMERCIAL

## 2018-12-20 VITALS
DIASTOLIC BLOOD PRESSURE: 75 MMHG | TEMPERATURE: 98 F | SYSTOLIC BLOOD PRESSURE: 117 MMHG | OXYGEN SATURATION: 100 % | HEART RATE: 70 BPM | WEIGHT: 229.5 LBS | RESPIRATION RATE: 16 BRPM | BODY MASS INDEX: 27 KG/M2

## 2018-12-20 DIAGNOSIS — C20 RECTAL CANCER (HCC): ICD-10-CM

## 2018-12-20 DIAGNOSIS — C78.01 SECONDARY CARCINOMA OF RIGHT LUNG (HCC): ICD-10-CM

## 2018-12-20 DIAGNOSIS — C78.7 SECONDARY LIVER CANCER (HCC): ICD-10-CM

## 2018-12-20 DIAGNOSIS — D50.0 IRON DEFICIENCY ANEMIA DUE TO CHRONIC BLOOD LOSS: ICD-10-CM

## 2018-12-20 DIAGNOSIS — C20 RECTAL CANCER (HCC): Primary | ICD-10-CM

## 2018-12-20 DIAGNOSIS — C18.6 CANCER OF DESCENDING COLON (HCC): ICD-10-CM

## 2018-12-20 DIAGNOSIS — C78.02 SECONDARY ADENOCARCINOMA OF LEFT LUNG (HCC): ICD-10-CM

## 2018-12-20 DIAGNOSIS — C78.7 SECONDARY LIVER CANCER (HCC): Primary | ICD-10-CM

## 2018-12-20 PROCEDURE — 80053 COMPREHEN METABOLIC PANEL: CPT

## 2018-12-20 PROCEDURE — 96413 CHEMO IV INFUSION 1 HR: CPT

## 2018-12-20 PROCEDURE — 85025 COMPLETE CBC W/AUTO DIFF WBC: CPT

## 2018-12-20 PROCEDURE — 96375 TX/PRO/DX INJ NEW DRUG ADDON: CPT

## 2018-12-20 PROCEDURE — 96368 THER/DIAG CONCURRENT INF: CPT

## 2018-12-20 PROCEDURE — 96415 CHEMO IV INFUSION ADDL HR: CPT

## 2018-12-20 PROCEDURE — 99214 OFFICE O/P EST MOD 30 MIN: CPT | Performed by: INTERNAL MEDICINE

## 2018-12-20 PROCEDURE — 96417 CHEMO IV INFUS EACH ADDL SEQ: CPT

## 2018-12-20 PROCEDURE — 82378 CARCINOEMBRYONIC ANTIGEN: CPT

## 2018-12-20 PROCEDURE — 96416 CHEMO PROLONG INFUSE W/PUMP: CPT

## 2018-12-20 PROCEDURE — 96411 CHEMO IV PUSH ADDL DRUG: CPT

## 2018-12-20 RX ORDER — FLUOROURACIL 50 MG/ML
2400 INJECTION, SOLUTION INTRAVENOUS CONTINUOUS
Status: DISCONTINUED | OUTPATIENT
Start: 2018-12-20 | End: 2018-12-20

## 2018-12-20 RX ORDER — SODIUM CHLORIDE 0.9 % (FLUSH) 0.9 %
10 SYRINGE (ML) INJECTION ONCE
Status: CANCELLED | OUTPATIENT
Start: 2018-12-20

## 2018-12-20 RX ORDER — METOCLOPRAMIDE HYDROCHLORIDE 5 MG/ML
10 INJECTION INTRAMUSCULAR; INTRAVENOUS EVERY 6 HOURS PRN
Status: CANCELLED | OUTPATIENT
Start: 2018-12-20

## 2018-12-20 RX ORDER — ONDANSETRON 2 MG/ML
8 INJECTION INTRAMUSCULAR; INTRAVENOUS EVERY 6 HOURS PRN
Status: CANCELLED | OUTPATIENT
Start: 2018-12-20

## 2018-12-20 RX ORDER — FLUOROURACIL 50 MG/ML
2400 INJECTION, SOLUTION INTRAVENOUS CONTINUOUS
Status: CANCELLED | OUTPATIENT
Start: 2018-12-20

## 2018-12-20 RX ORDER — PROCHLORPERAZINE MALEATE 10 MG
10 TABLET ORAL EVERY 6 HOURS PRN
Status: CANCELLED | OUTPATIENT
Start: 2018-12-20

## 2018-12-20 RX ORDER — LORAZEPAM 0.5 MG/1
TABLET ORAL EVERY 4 HOURS PRN
Status: CANCELLED | OUTPATIENT
Start: 2018-12-20

## 2018-12-20 RX ORDER — FLUOROURACIL 50 MG/ML
400 INJECTION, SOLUTION INTRAVENOUS ONCE
Status: COMPLETED | OUTPATIENT
Start: 2018-12-20 | End: 2018-12-20

## 2018-12-20 RX ORDER — LORAZEPAM 2 MG/ML
INJECTION INTRAMUSCULAR EVERY 4 HOURS PRN
Status: CANCELLED | OUTPATIENT
Start: 2018-12-20

## 2018-12-20 RX ORDER — FLUOROURACIL 50 MG/ML
400 INJECTION, SOLUTION INTRAVENOUS ONCE
Status: CANCELLED | OUTPATIENT
Start: 2018-12-20

## 2018-12-20 RX ADMIN — FLUOROURACIL 950 MG: 50 INJECTION, SOLUTION INTRAVENOUS at 13:59:00

## 2018-12-20 RX ADMIN — FLUOROURACIL 5800 MG: 50 INJECTION, SOLUTION INTRAVENOUS at 14:04:00

## 2018-12-20 NOTE — PROGRESS NOTES
Pt here for C4D1.   Arrives Ambulating independently,         Modifications in dose or schedule: No     Frequency of blood return and site check throughout administration: Prior to administration, Every 2-3 ml IVP and At completion of therapy   Discharged t

## 2018-12-21 NOTE — PROGRESS NOTES
[facility]    PATIENT'S NAME: Juda Leventhal   ATTENDING PHYSICIAN: Michel Yan M.D.    PATIENT ACCOUNT #: [de-identified] LOCATION: [address] [place] [address] RECORD #: [id number] YOB: 1965   DATE OF SERVICE: 12/20/2018       CANCER ROSA not having any major functional difficulties at present. He has had no significant oral mucositis. He has had a little bit of cutaneous toxicity from the 5-FU with some cracking of the skin across his knuckles and some darkening of the skin as well.   His treatment around that time, and then he would prefer to try to get the ostomy taken down. We will have to make sure he is off of Avastin for a period of about 6 weeks prior to that. I will coordinate this with Dr. Jennifer Bynum.     Dictated By Sharrie Councilman,

## 2018-12-22 ENCOUNTER — NURSE ONLY (OUTPATIENT)
Dept: HEMATOLOGY/ONCOLOGY | Facility: HOSPITAL | Age: 53
End: 2018-12-22
Attending: NURSE PRACTITIONER
Payer: COMMERCIAL

## 2018-12-22 PROCEDURE — 96523 IRRIG DRUG DELIVERY DEVICE: CPT

## 2018-12-27 ENCOUNTER — APPOINTMENT (OUTPATIENT)
Dept: HEMATOLOGY/ONCOLOGY | Age: 53
End: 2018-12-27
Attending: NURSE PRACTITIONER
Payer: COMMERCIAL

## 2018-12-31 ENCOUNTER — HOSPITAL ENCOUNTER (EMERGENCY)
Facility: HOSPITAL | Age: 53
Discharge: HOME | End: 2018-12-31
Attending: EMERGENCY MEDICINE
Payer: COMMERCIAL

## 2018-12-31 ENCOUNTER — TELEPHONE (OUTPATIENT)
Dept: HEMATOLOGY/ONCOLOGY | Facility: HOSPITAL | Age: 53
End: 2018-12-31

## 2018-12-31 ENCOUNTER — APPOINTMENT (OUTPATIENT)
Dept: ULTRASOUND IMAGING | Facility: HOSPITAL | Age: 53
End: 2018-12-31
Attending: EMERGENCY MEDICINE
Payer: COMMERCIAL

## 2018-12-31 VITALS
RESPIRATION RATE: 16 BRPM | SYSTOLIC BLOOD PRESSURE: 130 MMHG | BODY MASS INDEX: 27.16 KG/M2 | HEIGHT: 77 IN | DIASTOLIC BLOOD PRESSURE: 87 MMHG | OXYGEN SATURATION: 99 % | TEMPERATURE: 98 F | WEIGHT: 230 LBS | HEART RATE: 83 BPM

## 2018-12-31 DIAGNOSIS — I82.4Y2 ACUTE DEEP VEIN THROMBOSIS (DVT) OF PROXIMAL VEIN OF LEFT LOWER EXTREMITY (HCC): Primary | ICD-10-CM

## 2018-12-31 LAB
ALBUMIN SERPL-MCNC: 3.1 G/DL (ref 3.1–4.5)
ALBUMIN/GLOB SERPL: 0.8 {RATIO} (ref 1–2)
ALP LIVER SERPL-CCNC: 62 U/L (ref 45–117)
ALT SERPL-CCNC: 46 U/L (ref 17–63)
ANION GAP SERPL CALC-SCNC: 7 MMOL/L (ref 0–18)
APTT PPP: 31.7 SECONDS (ref 26.1–34.6)
AST SERPL-CCNC: 37 U/L (ref 15–41)
BASOPHILS # BLD AUTO: 0.03 X10(3) UL (ref 0–0.1)
BASOPHILS NFR BLD AUTO: 0.5 %
BILIRUB SERPL-MCNC: 0.5 MG/DL (ref 0.1–2)
BUN BLD-MCNC: 15 MG/DL (ref 8–20)
BUN/CREAT SERPL: 16.1 (ref 10–20)
CALCIUM BLD-MCNC: 8.4 MG/DL (ref 8.3–10.3)
CHLORIDE SERPL-SCNC: 110 MMOL/L (ref 101–111)
CO2 SERPL-SCNC: 26 MMOL/L (ref 22–32)
CREAT BLD-MCNC: 0.93 MG/DL (ref 0.7–1.3)
EOSINOPHIL # BLD AUTO: 0.16 X10(3) UL (ref 0–0.3)
EOSINOPHIL NFR BLD AUTO: 2.7 %
ERYTHROCYTE [DISTWIDTH] IN BLOOD BY AUTOMATED COUNT: 14.4 % (ref 11.5–16)
GLOBULIN PLAS-MCNC: 4 G/DL (ref 2.8–4.4)
GLUCOSE BLD-MCNC: 103 MG/DL (ref 70–99)
HCT VFR BLD AUTO: 33.8 % (ref 37–53)
HGB BLD-MCNC: 11.3 G/DL (ref 13–17)
IMMATURE GRANULOCYTE COUNT: 0.02 X10(3) UL (ref 0–1)
IMMATURE GRANULOCYTE RATIO %: 0.3 %
INR BLD: 1.1 (ref 0.9–1.1)
LYMPHOCYTES # BLD AUTO: 1.6 X10(3) UL (ref 0.9–4)
LYMPHOCYTES NFR BLD AUTO: 26.6 %
M PROTEIN MFR SERPL ELPH: 7.1 G/DL (ref 6.4–8.2)
MCH RBC QN AUTO: 29.7 PG (ref 27–33.2)
MCHC RBC AUTO-ENTMCNC: 33.4 G/DL (ref 31–37)
MCV RBC AUTO: 88.7 FL (ref 80–99)
MONOCYTES # BLD AUTO: 0.82 X10(3) UL (ref 0.1–1)
MONOCYTES NFR BLD AUTO: 13.6 %
NEUTROPHIL ABS PRELIM: 3.39 X10 (3) UL (ref 1.3–6.7)
NEUTROPHILS # BLD AUTO: 3.39 X10(3) UL (ref 1.3–6.7)
NEUTROPHILS NFR BLD AUTO: 56.3 %
OSMOLALITY SERPL CALC.SUM OF ELEC: 297 MOSM/KG (ref 275–295)
PLATELET # BLD AUTO: 145 10(3)UL (ref 150–450)
POTASSIUM SERPL-SCNC: 3.8 MMOL/L (ref 3.6–5.1)
PSA SERPL DL<=0.01 NG/ML-MCNC: 14.7 SECONDS (ref 12.4–14.7)
RBC # BLD AUTO: 3.81 X10(6)UL (ref 4.3–5.7)
RED CELL DISTRIBUTION WIDTH-SD: 44.5 FL (ref 35.1–46.3)
SODIUM SERPL-SCNC: 143 MMOL/L (ref 136–144)
WBC # BLD AUTO: 6 X10(3) UL (ref 4–13)

## 2018-12-31 PROCEDURE — 80053 COMPREHEN METABOLIC PANEL: CPT | Performed by: EMERGENCY MEDICINE

## 2018-12-31 PROCEDURE — 36591 DRAW BLOOD OFF VENOUS DEVICE: CPT

## 2018-12-31 PROCEDURE — 99284 EMERGENCY DEPT VISIT MOD MDM: CPT

## 2018-12-31 PROCEDURE — 85730 THROMBOPLASTIN TIME PARTIAL: CPT | Performed by: EMERGENCY MEDICINE

## 2018-12-31 PROCEDURE — 85610 PROTHROMBIN TIME: CPT | Performed by: EMERGENCY MEDICINE

## 2018-12-31 PROCEDURE — 99285 EMERGENCY DEPT VISIT HI MDM: CPT

## 2018-12-31 PROCEDURE — 93971 EXTREMITY STUDY: CPT | Performed by: EMERGENCY MEDICINE

## 2018-12-31 PROCEDURE — 85025 COMPLETE CBC W/AUTO DIFF WBC: CPT | Performed by: EMERGENCY MEDICINE

## 2018-12-31 NOTE — TELEPHONE ENCOUNTER
Toxicities:  C4 D1 Bevacizumab/Oxaliplatin/Leucovorian Calcium/Flurouracil on 12/20/2018  Saw Dr Andria Andino on 12/20/2018    Hermelindo Massey reports for the last 2 days his left leg has been swollen from his toes to his knee. He reports it is much larger than his right leg. It is warm to touch. He denies redness. He is having pain in his calf which increases when he walks. I explained to Hermelindo Massey that he may have a blood clot in his leg. He will need to be evaluated in the ER. Hermelindo Massey said he lives in [place].  He will go to the ER at [facility]. I told him I would send an inbox message to Dr Carole Barthel, [place].

## 2018-12-31 NOTE — ED INITIAL ASSESSMENT (HPI)
Pt here with c/o pain to left leg, from knee down, since Wednesday. Patient states he thought maybe he pulled a muscle but it isn't getting any better, mild swelling. Patient states is currently being treated for CA, not on any blood thinners.  PCP sent satya

## 2018-12-31 NOTE — ED PROVIDER NOTES
Patient Seen in: [facility] Emergency Department    History   Patient presents with:  Deep Vein Thrombosis (cardiovascular)  Swelling Edema (cardiovascular, metabolic)    Stated Complaint: Left leg swollen, painful. Concern for DVT.  Going thru chemo [12/31/18 1202]   BP (!) 159/95   Pulse 89   Resp 16   Temp 98 °F (36.7 °C)   Temp src    SpO2 100 %   O2 Device None (Room air)       Current:/87   Pulse 83   Temp 98 °F (36.7 °C)   Resp 16   Ht 195.6 cm (6' 5\")   Wt 104.3 kg   SpO2 99%   BMI 27.27 TECHNIQUE:  Real time, grey scale, and duplex ultrasound was used to evaluate the lower extremity venous system. B-mode two-dimensional images of the vascular structures, Doppler spectral analysis, and color flow. Doppler imaging were performed.   The foll possible for a visit in 2 days          Medications Prescribed:  Current Discharge Medication List    START taking these medications    rivaroxaban 15 & 20 MG Oral Tablet Therapy Pack  Take As Directed based on package instructions:  Days 1-21: 15 mg by mo

## 2018-12-31 NOTE — CM/SW NOTE
Emergency Department Discharge Plan    Shaka Diaz is a 48year old male who presented to the ED with DVT. ED Case Manager was asked to assist in arranging for home anticoagulation. Heavenenriquejhony Diaz and I discussed indications for anticoagulation.

## 2018-12-31 NOTE — TELEPHONE ENCOUNTER
Last couple days has pain in left leg. It's swelling over last couple days and hurts to walk. Not warm or discolored.   [phone]

## 2019-01-03 ENCOUNTER — OFFICE VISIT (OUTPATIENT)
Dept: HEMATOLOGY/ONCOLOGY | Age: 54
End: 2019-01-03
Attending: NURSE PRACTITIONER
Payer: COMMERCIAL

## 2019-01-03 VITALS
TEMPERATURE: 99 F | HEART RATE: 99 BPM | DIASTOLIC BLOOD PRESSURE: 88 MMHG | SYSTOLIC BLOOD PRESSURE: 134 MMHG | OXYGEN SATURATION: 98 % | RESPIRATION RATE: 16 BRPM | WEIGHT: 232 LBS | BODY MASS INDEX: 28 KG/M2

## 2019-01-03 DIAGNOSIS — C18.6 CANCER OF DESCENDING COLON (HCC): ICD-10-CM

## 2019-01-03 DIAGNOSIS — C20 RECTAL CANCER (HCC): ICD-10-CM

## 2019-01-03 DIAGNOSIS — C78.02 SECONDARY ADENOCARCINOMA OF LEFT LUNG (HCC): ICD-10-CM

## 2019-01-03 DIAGNOSIS — C20 RECTAL CANCER (HCC): Primary | ICD-10-CM

## 2019-01-03 DIAGNOSIS — C78.7 SECONDARY LIVER CANCER (HCC): ICD-10-CM

## 2019-01-03 DIAGNOSIS — C78.01 SECONDARY CARCINOMA OF RIGHT LUNG (HCC): ICD-10-CM

## 2019-01-03 DIAGNOSIS — C78.7 SECONDARY LIVER CANCER (HCC): Primary | ICD-10-CM

## 2019-01-03 DIAGNOSIS — D50.0 IRON DEFICIENCY ANEMIA DUE TO CHRONIC BLOOD LOSS: ICD-10-CM

## 2019-01-03 DIAGNOSIS — I82.412 ACUTE DEEP VEIN THROMBOSIS (DVT) OF FEMORAL VEIN OF LEFT LOWER EXTREMITY (HCC): ICD-10-CM

## 2019-01-03 LAB
ALBUMIN SERPL-MCNC: 3.2 G/DL (ref 3.1–4.5)
ALBUMIN/GLOB SERPL: 0.8 {RATIO} (ref 1–2)
ALP LIVER SERPL-CCNC: 74 U/L (ref 45–117)
ALT SERPL-CCNC: 35 U/L (ref 17–63)
ANION GAP SERPL CALC-SCNC: 7 MMOL/L (ref 0–18)
AST SERPL-CCNC: 22 U/L (ref 15–41)
BASOPHILS # BLD AUTO: 0.05 X10(3) UL (ref 0–0.1)
BASOPHILS NFR BLD AUTO: 0.7 %
BILIRUB SERPL-MCNC: 0.5 MG/DL (ref 0.1–2)
BUN BLD-MCNC: 16 MG/DL (ref 8–20)
BUN/CREAT SERPL: 16.8 (ref 10–20)
CALCIUM BLD-MCNC: 8.5 MG/DL (ref 8.3–10.3)
CEA SERPL-MCNC: 70.4 NG/ML (ref 0.5–5)
CHLORIDE SERPL-SCNC: 105 MMOL/L (ref 101–111)
CO2 SERPL-SCNC: 27 MMOL/L (ref 22–32)
CREAT BLD-MCNC: 0.95 MG/DL (ref 0.7–1.3)
EOSINOPHIL # BLD AUTO: 0.27 X10(3) UL (ref 0–0.3)
EOSINOPHIL NFR BLD AUTO: 3.9 %
ERYTHROCYTE [DISTWIDTH] IN BLOOD BY AUTOMATED COUNT: 14.6 % (ref 11.5–16)
GLOBULIN PLAS-MCNC: 4.2 G/DL (ref 2.8–4.4)
GLUCOSE BLD-MCNC: 129 MG/DL (ref 70–99)
HCT VFR BLD AUTO: 34.8 % (ref 37–53)
HGB BLD-MCNC: 11.2 G/DL (ref 13–17)
IMMATURE GRANULOCYTE COUNT: 0.03 X10(3) UL (ref 0–1)
IMMATURE GRANULOCYTE RATIO %: 0.4 %
LYMPHOCYTES # BLD AUTO: 1.13 X10(3) UL (ref 0.9–4)
LYMPHOCYTES NFR BLD AUTO: 16.3 %
M PROTEIN MFR SERPL ELPH: 7.4 G/DL (ref 6.4–8.2)
MCH RBC QN AUTO: 28.7 PG (ref 27–33.2)
MCHC RBC AUTO-ENTMCNC: 32.2 G/DL (ref 31–37)
MCV RBC AUTO: 89.2 FL (ref 80–99)
MONOCYTES # BLD AUTO: 0.6 X10(3) UL (ref 0.1–1)
MONOCYTES NFR BLD AUTO: 8.7 %
NEUTROPHIL ABS PRELIM: 4.85 X10 (3) UL (ref 1.3–6.7)
NEUTROPHILS # BLD AUTO: 4.85 X10(3) UL (ref 1.3–6.7)
NEUTROPHILS NFR BLD AUTO: 70 %
OSMOLALITY SERPL CALC.SUM OF ELEC: 291 MOSM/KG (ref 275–295)
PLATELET # BLD AUTO: 185 10(3)UL (ref 150–450)
POTASSIUM SERPL-SCNC: 3.9 MMOL/L (ref 3.6–5.1)
RBC # BLD AUTO: 3.9 X10(6)UL (ref 4.3–5.7)
RED CELL DISTRIBUTION WIDTH-SD: 45.8 FL (ref 35.1–46.3)
SODIUM SERPL-SCNC: 139 MMOL/L (ref 136–144)
WBC # BLD AUTO: 6.9 X10(3) UL (ref 4–13)

## 2019-01-03 PROCEDURE — 82378 CARCINOEMBRYONIC ANTIGEN: CPT

## 2019-01-03 PROCEDURE — 85025 COMPLETE CBC W/AUTO DIFF WBC: CPT

## 2019-01-03 PROCEDURE — 96415 CHEMO IV INFUSION ADDL HR: CPT

## 2019-01-03 PROCEDURE — 96417 CHEMO IV INFUS EACH ADDL SEQ: CPT

## 2019-01-03 PROCEDURE — 80053 COMPREHEN METABOLIC PANEL: CPT

## 2019-01-03 PROCEDURE — 96413 CHEMO IV INFUSION 1 HR: CPT

## 2019-01-03 PROCEDURE — 96368 THER/DIAG CONCURRENT INF: CPT

## 2019-01-03 PROCEDURE — 96375 TX/PRO/DX INJ NEW DRUG ADDON: CPT

## 2019-01-03 PROCEDURE — 99214 OFFICE O/P EST MOD 30 MIN: CPT | Performed by: INTERNAL MEDICINE

## 2019-01-03 PROCEDURE — 96411 CHEMO IV PUSH ADDL DRUG: CPT

## 2019-01-03 RX ORDER — ONDANSETRON 2 MG/ML
8 INJECTION INTRAMUSCULAR; INTRAVENOUS EVERY 6 HOURS PRN
Status: CANCELLED | OUTPATIENT
Start: 2019-01-03

## 2019-01-03 RX ORDER — SODIUM CHLORIDE 0.9 % (FLUSH) 0.9 %
10 SYRINGE (ML) INJECTION ONCE
Status: CANCELLED | OUTPATIENT
Start: 2019-01-03

## 2019-01-03 RX ORDER — PROCHLORPERAZINE MALEATE 10 MG
10 TABLET ORAL EVERY 6 HOURS PRN
Status: CANCELLED | OUTPATIENT
Start: 2019-01-03

## 2019-01-03 RX ORDER — FLUOROURACIL 50 MG/ML
400 INJECTION, SOLUTION INTRAVENOUS ONCE
Status: COMPLETED | OUTPATIENT
Start: 2019-01-03 | End: 2019-01-03

## 2019-01-03 RX ORDER — FLUOROURACIL 50 MG/ML
2400 INJECTION, SOLUTION INTRAVENOUS CONTINUOUS
Status: DISCONTINUED | OUTPATIENT
Start: 2019-01-03 | End: 2019-01-03

## 2019-01-03 RX ORDER — LORAZEPAM 2 MG/ML
INJECTION INTRAMUSCULAR EVERY 4 HOURS PRN
Status: CANCELLED | OUTPATIENT
Start: 2019-01-03

## 2019-01-03 RX ORDER — LORAZEPAM 0.5 MG/1
TABLET ORAL EVERY 4 HOURS PRN
Status: CANCELLED | OUTPATIENT
Start: 2019-01-03

## 2019-01-03 RX ORDER — FLUOROURACIL 50 MG/ML
400 INJECTION, SOLUTION INTRAVENOUS ONCE
Status: CANCELLED | OUTPATIENT
Start: 2019-01-03

## 2019-01-03 RX ORDER — FLUOROURACIL 50 MG/ML
2400 INJECTION, SOLUTION INTRAVENOUS CONTINUOUS
Status: CANCELLED | OUTPATIENT
Start: 2019-01-03

## 2019-01-03 RX ORDER — METOCLOPRAMIDE HYDROCHLORIDE 5 MG/ML
10 INJECTION INTRAMUSCULAR; INTRAVENOUS EVERY 6 HOURS PRN
Status: CANCELLED | OUTPATIENT
Start: 2019-01-03

## 2019-01-03 RX ADMIN — FLUOROURACIL 5800 MG: 50 INJECTION, SOLUTION INTRAVENOUS at 15:05:00

## 2019-01-03 RX ADMIN — FLUOROURACIL 950 MG: 50 INJECTION, SOLUTION INTRAVENOUS at 14:58:00

## 2019-01-03 NOTE — PROGRESS NOTES
Pt here for C5D1.   Arrives Ambulating independently, accompanied by Self           Modifications in dose or schedule: No     Frequency of blood return and site check throughout administration: Prior to administration   Discharged to Home, Ambulating indepe

## 2019-01-03 NOTE — PATIENT INSTRUCTIONS
To reach Dr Lisa ferrer during business hours, please call [phone]. After hours, including weekends, evenings, and holidays, please call the main number [phone] for emergent needs.

## 2019-01-03 NOTE — PROGRESS NOTES
Outpatient Oncology Care Plan  Problem list:  loss of appetite  constipation  fatigue  peripheral neuropathy    Problems related to:    chemotherapy    Interventions:  provided general teaching    Expected outcomes:  symptoms relieved/minimized  understand

## 2019-01-04 NOTE — PROGRESS NOTES
[address]    PATIENT'S NAME: Sid Dottie   ATTENDING PHYSICIAN: Nicole Sanchez M.D.    PATIENT ACCOUNT #: [de-identified] LOCATION: [address] [place] [address] RECORD #: [id number] YOB: 1965   DATE OF SERVICE: 01/03/2019       CANCER ROSA has been able to maintain his weight without any major difficulty at present. He feels as though he is tolerating the chemotherapy well. He has had a total of 4 cycles, and he is here for his fifth.   He did have a CT scan part of the way through the docu not to exercise vigorously for at least 2 weeks.   I have also suggested to him that when the time comes, we will probably end up putting a filter in him for a surgical procedure and then keep him anticoagulated after the fact, given the procoagulant nature

## 2019-01-05 ENCOUNTER — NURSE ONLY (OUTPATIENT)
Dept: HEMATOLOGY/ONCOLOGY | Facility: HOSPITAL | Age: 54
End: 2019-01-05
Attending: NURSE PRACTITIONER
Payer: COMMERCIAL

## 2019-01-05 DIAGNOSIS — C18.6 CANCER OF DESCENDING COLON (HCC): ICD-10-CM

## 2019-01-05 DIAGNOSIS — C78.02 SECONDARY ADENOCARCINOMA OF LEFT LUNG (HCC): ICD-10-CM

## 2019-01-05 DIAGNOSIS — C78.01 SECONDARY CARCINOMA OF RIGHT LUNG (HCC): ICD-10-CM

## 2019-01-05 DIAGNOSIS — C20 RECTAL CANCER (HCC): ICD-10-CM

## 2019-01-05 DIAGNOSIS — C78.7 SECONDARY LIVER CANCER (HCC): Primary | ICD-10-CM

## 2019-01-05 PROCEDURE — 96523 IRRIG DRUG DELIVERY DEVICE: CPT

## 2019-01-05 RX ORDER — SODIUM CHLORIDE 0.9 % (FLUSH) 0.9 %
10 SYRINGE (ML) INJECTION ONCE
Status: CANCELLED | OUTPATIENT
Start: 2019-01-05

## 2019-01-05 RX ORDER — SODIUM CHLORIDE 0.9 % (FLUSH) 0.9 %
10 SYRINGE (ML) INJECTION ONCE
Status: COMPLETED | OUTPATIENT
Start: 2019-01-05 | End: 2019-01-05

## 2019-01-05 RX ADMIN — SODIUM CHLORIDE 0.9 % (FLUSH) 10 ML: 0.9 % SYRINGE (ML) INJECTION at 10:20:00

## 2019-01-17 ENCOUNTER — OFFICE VISIT (OUTPATIENT)
Dept: SURGERY | Facility: CLINIC | Age: 54
End: 2019-01-17
Payer: COMMERCIAL

## 2019-01-17 ENCOUNTER — OFFICE VISIT (OUTPATIENT)
Dept: HEMATOLOGY/ONCOLOGY | Age: 54
End: 2019-01-17
Attending: NURSE PRACTITIONER
Payer: COMMERCIAL

## 2019-01-17 VITALS — BODY MASS INDEX: 28 KG/M2 | WEIGHT: 232.5 LBS

## 2019-01-17 VITALS
TEMPERATURE: 98 F | DIASTOLIC BLOOD PRESSURE: 91 MMHG | SYSTOLIC BLOOD PRESSURE: 155 MMHG | RESPIRATION RATE: 18 BRPM | BODY MASS INDEX: 27.39 KG/M2 | HEART RATE: 99 BPM | WEIGHT: 232 LBS | HEIGHT: 77 IN

## 2019-01-17 DIAGNOSIS — C18.6 CANCER OF DESCENDING COLON (HCC): ICD-10-CM

## 2019-01-17 DIAGNOSIS — C20 RECTAL CANCER (HCC): ICD-10-CM

## 2019-01-17 DIAGNOSIS — C78.02 SECONDARY ADENOCARCINOMA OF LEFT LUNG (HCC): ICD-10-CM

## 2019-01-17 DIAGNOSIS — I82.432 ACUTE DEEP VEIN THROMBOSIS (DVT) OF POPLITEAL VEIN OF LEFT LOWER EXTREMITY (HCC): ICD-10-CM

## 2019-01-17 DIAGNOSIS — Z93.2 ILEOSTOMY IN PLACE (HCC): Primary | ICD-10-CM

## 2019-01-17 DIAGNOSIS — C78.7 SECONDARY LIVER CANCER (HCC): Primary | ICD-10-CM

## 2019-01-17 DIAGNOSIS — C78.01 SECONDARY CARCINOMA OF RIGHT LUNG (HCC): ICD-10-CM

## 2019-01-17 DIAGNOSIS — Z51.11 ENCOUNTER FOR CHEMOTHERAPY MANAGEMENT: ICD-10-CM

## 2019-01-17 DIAGNOSIS — D64.81 ANEMIA DUE TO ANTINEOPLASTIC CHEMOTHERAPY: ICD-10-CM

## 2019-01-17 DIAGNOSIS — C78.7 SECONDARY LIVER CANCER (HCC): ICD-10-CM

## 2019-01-17 DIAGNOSIS — T45.1X5A ANEMIA DUE TO ANTINEOPLASTIC CHEMOTHERAPY: ICD-10-CM

## 2019-01-17 DIAGNOSIS — T45.1X5A NEUROPATHY DUE TO CHEMOTHERAPEUTIC DRUG (HCC): ICD-10-CM

## 2019-01-17 DIAGNOSIS — G62.0 NEUROPATHY DUE TO CHEMOTHERAPEUTIC DRUG (HCC): ICD-10-CM

## 2019-01-17 LAB
ALBUMIN SERPL-MCNC: 3 G/DL (ref 3.1–4.5)
ALBUMIN/GLOB SERPL: 0.8 {RATIO} (ref 1–2)
ALP LIVER SERPL-CCNC: 69 U/L (ref 45–117)
ALT SERPL-CCNC: 20 U/L (ref 17–63)
ANION GAP SERPL CALC-SCNC: 6 MMOL/L (ref 0–18)
AST SERPL-CCNC: 25 U/L (ref 15–41)
BASOPHILS # BLD AUTO: 0.04 X10(3) UL (ref 0–0.1)
BASOPHILS NFR BLD AUTO: 0.7 %
BILIRUB SERPL-MCNC: 0.3 MG/DL (ref 0.1–2)
BUN BLD-MCNC: 11 MG/DL (ref 8–20)
BUN/CREAT SERPL: 10.8 (ref 10–20)
CALCIUM BLD-MCNC: 8.2 MG/DL (ref 8.3–10.3)
CEA SERPL-MCNC: 39.3 NG/ML (ref 0.5–5)
CHLORIDE SERPL-SCNC: 109 MMOL/L (ref 101–111)
CO2 SERPL-SCNC: 26 MMOL/L (ref 22–32)
CONTROL RUN WITHIN 24 HOURS?: YES
CREAT BLD-MCNC: 1.02 MG/DL (ref 0.7–1.3)
EOSINOPHIL # BLD AUTO: 0.19 X10(3) UL (ref 0–0.3)
EOSINOPHIL NFR BLD AUTO: 3.2 %
ERYTHROCYTE [DISTWIDTH] IN BLOOD BY AUTOMATED COUNT: 16.6 % (ref 11.5–16)
GLOBULIN PLAS-MCNC: 3.7 G/DL (ref 2.8–4.4)
GLUCOSE BLD-MCNC: 109 MG/DL (ref 70–99)
GLUCOSE URINE: NEGATIVE
HCT VFR BLD AUTO: 34 % (ref 37–53)
HGB BLD-MCNC: 11.1 G/DL (ref 13–17)
IMMATURE GRANULOCYTE COUNT: 0.02 X10(3) UL (ref 0–1)
IMMATURE GRANULOCYTE RATIO %: 0.3 %
LEUKOCYTE ESTERASE URINE: NEGATIVE
LYMPHOCYTES # BLD AUTO: 1.47 X10(3) UL (ref 0.9–4)
LYMPHOCYTES NFR BLD AUTO: 25.1 %
M PROTEIN MFR SERPL ELPH: 6.7 G/DL (ref 6.4–8.2)
MCH RBC QN AUTO: 30 PG (ref 27–33.2)
MCHC RBC AUTO-ENTMCNC: 32.6 G/DL (ref 31–37)
MCV RBC AUTO: 91.9 FL (ref 80–99)
MONOCYTES # BLD AUTO: 0.49 X10(3) UL (ref 0.1–1)
MONOCYTES NFR BLD AUTO: 8.4 %
NEUTROPHIL ABS PRELIM: 3.64 X10 (3) UL (ref 1.3–6.7)
NEUTROPHILS # BLD AUTO: 3.64 X10(3) UL (ref 1.3–6.7)
NEUTROPHILS NFR BLD AUTO: 62.3 %
NITRITE URINE: NEGATIVE
OSMOLALITY SERPL CALC.SUM OF ELEC: 292 MOSM/KG (ref 275–295)
PLATELET # BLD AUTO: 165 10(3)UL (ref 150–450)
POTASSIUM SERPL-SCNC: 4 MMOL/L (ref 3.6–5.1)
PROTEIN URINE: NEGATIVE
RBC # BLD AUTO: 3.7 X10(6)UL (ref 4.3–5.7)
RED CELL DISTRIBUTION WIDTH-SD: 53.1 FL (ref 35.1–46.3)
SODIUM SERPL-SCNC: 141 MMOL/L (ref 136–144)
WBC # BLD AUTO: 5.9 X10(3) UL (ref 4–13)

## 2019-01-17 PROCEDURE — 96368 THER/DIAG CONCURRENT INF: CPT

## 2019-01-17 PROCEDURE — 80053 COMPREHEN METABOLIC PANEL: CPT

## 2019-01-17 PROCEDURE — 99214 OFFICE O/P EST MOD 30 MIN: CPT | Performed by: COLON & RECTAL SURGERY

## 2019-01-17 PROCEDURE — 96415 CHEMO IV INFUSION ADDL HR: CPT

## 2019-01-17 PROCEDURE — 96366 THER/PROPH/DIAG IV INF ADDON: CPT

## 2019-01-17 PROCEDURE — 96411 CHEMO IV PUSH ADDL DRUG: CPT

## 2019-01-17 PROCEDURE — 82378 CARCINOEMBRYONIC ANTIGEN: CPT

## 2019-01-17 PROCEDURE — 99214 OFFICE O/P EST MOD 30 MIN: CPT | Performed by: CLINICAL NURSE SPECIALIST

## 2019-01-17 PROCEDURE — 96367 TX/PROPH/DG ADDL SEQ IV INF: CPT

## 2019-01-17 PROCEDURE — 96413 CHEMO IV INFUSION 1 HR: CPT

## 2019-01-17 PROCEDURE — 85025 COMPLETE CBC W/AUTO DIFF WBC: CPT

## 2019-01-17 PROCEDURE — 96417 CHEMO IV INFUS EACH ADDL SEQ: CPT

## 2019-01-17 PROCEDURE — 96375 TX/PRO/DX INJ NEW DRUG ADDON: CPT

## 2019-01-17 RX ORDER — FLUOROURACIL 50 MG/ML
400 INJECTION, SOLUTION INTRAVENOUS ONCE
Status: COMPLETED | OUTPATIENT
Start: 2019-01-17 | End: 2019-01-17

## 2019-01-17 RX ORDER — FLUOROURACIL 50 MG/ML
2400 INJECTION, SOLUTION INTRAVENOUS CONTINUOUS
Status: DISCONTINUED | OUTPATIENT
Start: 2019-01-17 | End: 2019-01-17

## 2019-01-17 RX ORDER — LORAZEPAM 2 MG/ML
INJECTION INTRAMUSCULAR EVERY 4 HOURS PRN
Status: CANCELLED | OUTPATIENT
Start: 2019-01-17

## 2019-01-17 RX ORDER — FLUOROURACIL 50 MG/ML
400 INJECTION, SOLUTION INTRAVENOUS ONCE
Status: CANCELLED | OUTPATIENT
Start: 2019-01-17

## 2019-01-17 RX ORDER — FLUOROURACIL 50 MG/ML
2400 INJECTION, SOLUTION INTRAVENOUS CONTINUOUS
Status: CANCELLED | OUTPATIENT
Start: 2019-01-17

## 2019-01-17 RX ORDER — PROCHLORPERAZINE MALEATE 10 MG
10 TABLET ORAL EVERY 6 HOURS PRN
Status: CANCELLED | OUTPATIENT
Start: 2019-01-17

## 2019-01-17 RX ORDER — LORAZEPAM 0.5 MG/1
TABLET ORAL EVERY 4 HOURS PRN
Status: CANCELLED | OUTPATIENT
Start: 2019-01-17

## 2019-01-17 RX ORDER — METOCLOPRAMIDE HYDROCHLORIDE 5 MG/ML
10 INJECTION INTRAMUSCULAR; INTRAVENOUS EVERY 6 HOURS PRN
Status: CANCELLED | OUTPATIENT
Start: 2019-01-17

## 2019-01-17 RX ORDER — ONDANSETRON 2 MG/ML
8 INJECTION INTRAMUSCULAR; INTRAVENOUS EVERY 6 HOURS PRN
Status: CANCELLED | OUTPATIENT
Start: 2019-01-17

## 2019-01-17 RX ADMIN — FLUOROURACIL 5800 MG: 50 INJECTION, SOLUTION INTRAVENOUS at 14:01:00

## 2019-01-17 RX ADMIN — FLUOROURACIL 950 MG: 50 INJECTION, SOLUTION INTRAVENOUS at 13:55:00

## 2019-01-17 NOTE — PATIENT INSTRUCTIONS
For Dr. Humberto Smith nurse line, call [phone] with any questions or concerns,  Monday through Friday 8:00 to 4:30.      After hours or weekends for urgent needs: [phone].   Central [phone]  Medical Records:   [phone]  Cancer [facility]

## 2019-01-17 NOTE — PROGRESS NOTES
Patient is here for APN f/u and cycle 6 of chemo. Patient was diagnosed with a DVT in left leg on 12/31. Patient is now on Xarelto. C/o swelling and discomfort in left leg slowly improving. Eating well.  Mild fatigue and it is lasting longer with each cycle

## 2019-01-17 NOTE — PROGRESS NOTES
Follow Up Visit Note       Active Problems      1. Ileostomy in place [facility])    2. Rectal cancer ([facility]    3. Secondary liver cancer ([facility]    4. Secondary carcinoma of right lung ([facility]    5. Secondary adenocarcinoma of left lung ([address]    6.  Cancer of descend on CT scan obtained on November 3, 2018. The patient was subsequently started on FOLFOX and Avastin.     Last CT scan on November 24, 2018.       By signing my name below, Viviana Peng,  attest that this documentation has been prepared under the d tablet, Rfl: 3  •  rivaroxaban 15 & 20 MG Oral Tablet Therapy Pack, Take As Directed based on package instructions: Days 1-21: 15 mg by mouth twice daily Days 22-30: 20 mg by mouth once daily, Disp: 1 each, Rfl: 0  •  ondansetron 8 MG Oral Tablet Dispersib Eyes: Conjunctivae are normal. No scleral icterus. Neck: Trachea normal. No JVD present. Carotid bruit is not present. No tracheal deviation present. No thyroid mass and no thyromegaly present.    Cardiovascular: Normal rate, regular rhythm, S1 normal, adenocarcinoma of left lung (HCC)  Cancer of descending colon ([address] approximately 40 cm, within a large adenomatous polyp    Plan   Darell Swanson is a 48year old male here for continued care on his colon cancer.     The patient had a DVT of the left le procedure. This procedure will be scheduled for April 24, 2019. He will need Raoul IVC filter for this procedure. No orders of the defined types were placed in this encounter.       Imaging & Referrals   None    Follow Up  Return in 2 months (on

## 2019-01-17 NOTE — PROGRESS NOTES
Pt here for C6D1.   Arrives Ambulating independently,   Modifications in dose or schedule: No     Frequency of blood return and site check throughout administration: Prior to administration, Every 2-3 ml IVP and At completion of therapy   Discharged to Home

## 2019-01-17 NOTE — PROGRESS NOTES
ANP Visit Note    Patient Name: Barrington Ward   YOB: 1965   Medical Record Number: [id number]   CSN: [id number]   Date of visit: 1/17/2019       Chief Complaint/Reason for Visit:  Patient presents with:  Chemotherapy  Metastatic Rectal Cance Diabetes Father         borderline   • Lipids Father    • Obesity Father    • Lipids Mother    • Obesity Mother        Social History:  Social History    Socioeconomic History      Marital status:       Spouse name: Not on file      Number of childr by mouth daily. , Disp: , Rfl:     Review of Systems:  A comprehensive 14 point review of systems was completed. Pertinent positives and negatives noted in the the HPI.     Performance Status:    Physical Examination:  General: Patient is alert and oriented 3.70 (L) 4.30 - 5.70 x10(6)uL    HGB 11.1 (L) 13.0 - 17.0 g/dL    HCT 34.0 (L) 37.0 - 53.0 %    .0 150.0 - 450.0 10(3)uL    MCV 91.9 80.0 - 99.0 fL    MCH 30.0 27.0 - 33.2 pg    MCHC 32.6 31.0 - 37.0 g/dL    RDW 16.6 (H) 11.5 - 16.0 %    RDW-SD 53. 1 ANP-BC  Nurse Practitioner  [facility]

## 2019-01-18 PROBLEM — Z93.2 ILEOSTOMY IN PLACE (HCC): Status: ACTIVE | Noted: 2019-01-18

## 2019-01-18 NOTE — PATIENT INSTRUCTIONS
Vivian Toure is a 48year old male here for continued care on his colon cancer. The patient had a DVT of the left leg December 31, 2018. He is now taking Xarelto. This was thought to be secondary from Avastin during his chemotherapy.     He denies an this procedure to plan for further treatment concerning the takedown ileostomy procedure. This procedure will be scheduled for April 24, 2019. He will need Merkel IVC filter for this procedure.

## 2019-01-19 ENCOUNTER — NURSE ONLY (OUTPATIENT)
Dept: HEMATOLOGY/ONCOLOGY | Facility: HOSPITAL | Age: 54
End: 2019-01-19
Attending: NURSE PRACTITIONER
Payer: COMMERCIAL

## 2019-01-19 PROCEDURE — 96523 IRRIG DRUG DELIVERY DEVICE: CPT

## 2019-01-31 ENCOUNTER — OFFICE VISIT (OUTPATIENT)
Dept: HEMATOLOGY/ONCOLOGY | Age: 54
End: 2019-01-31
Attending: NURSE PRACTITIONER
Payer: COMMERCIAL

## 2019-01-31 VITALS
HEIGHT: 77.01 IN | BODY MASS INDEX: 27.69 KG/M2 | SYSTOLIC BLOOD PRESSURE: 116 MMHG | DIASTOLIC BLOOD PRESSURE: 77 MMHG | TEMPERATURE: 97 F | HEART RATE: 80 BPM | OXYGEN SATURATION: 100 % | WEIGHT: 234.5 LBS | RESPIRATION RATE: 18 BRPM

## 2019-01-31 DIAGNOSIS — C20 RECTAL CANCER (HCC): ICD-10-CM

## 2019-01-31 DIAGNOSIS — C78.02 SECONDARY ADENOCARCINOMA OF LEFT LUNG (HCC): ICD-10-CM

## 2019-01-31 DIAGNOSIS — C78.01 SECONDARY CARCINOMA OF RIGHT LUNG (HCC): ICD-10-CM

## 2019-01-31 DIAGNOSIS — C78.7 SECONDARY LIVER CANCER (HCC): ICD-10-CM

## 2019-01-31 DIAGNOSIS — C78.7 SECONDARY LIVER CANCER (HCC): Primary | ICD-10-CM

## 2019-01-31 DIAGNOSIS — C20 RECTAL CANCER (HCC): Primary | ICD-10-CM

## 2019-01-31 LAB
ALBUMIN SERPL-MCNC: 3.4 G/DL (ref 3.1–4.5)
ALBUMIN/GLOB SERPL: 0.9 {RATIO} (ref 1–2)
ALP LIVER SERPL-CCNC: 68 U/L (ref 45–117)
ALT SERPL-CCNC: 20 U/L (ref 17–63)
ANION GAP SERPL CALC-SCNC: 8 MMOL/L (ref 0–18)
AST SERPL-CCNC: 19 U/L (ref 15–41)
BASOPHILS # BLD AUTO: 0.04 X10(3) UL (ref 0–0.2)
BASOPHILS NFR BLD AUTO: 0.7 %
BILIRUB SERPL-MCNC: 0.5 MG/DL (ref 0.1–2)
BUN BLD-MCNC: 17 MG/DL (ref 8–20)
BUN/CREAT SERPL: 17.7 (ref 10–20)
CALCIUM BLD-MCNC: 8.4 MG/DL (ref 8.3–10.3)
CEA SERPL-MCNC: 23.3 NG/ML (ref 0.5–5)
CHLORIDE SERPL-SCNC: 109 MMOL/L (ref 101–111)
CO2 SERPL-SCNC: 24 MMOL/L (ref 22–32)
CREAT BLD-MCNC: 0.96 MG/DL (ref 0.7–1.3)
DEPRECATED RDW RBC AUTO: 58.4 FL (ref 35.1–46.3)
EOSINOPHIL # BLD AUTO: 0.14 X10(3) UL (ref 0–0.7)
EOSINOPHIL NFR BLD AUTO: 2.4 %
ERYTHROCYTE [DISTWIDTH] IN BLOOD BY AUTOMATED COUNT: 17.5 % (ref 11–15)
GLOBULIN PLAS-MCNC: 3.8 G/DL (ref 2.8–4.4)
GLUCOSE BLD-MCNC: 88 MG/DL (ref 70–99)
HCT VFR BLD AUTO: 36.7 % (ref 39–53)
HGB BLD-MCNC: 11.9 G/DL (ref 13–17.5)
IMM GRANULOCYTES # BLD AUTO: 0.02 X10(3) UL (ref 0–1)
IMM GRANULOCYTES NFR BLD: 0.3 %
LYMPHOCYTES # BLD AUTO: 1.32 X10(3) UL (ref 1–4)
LYMPHOCYTES NFR BLD AUTO: 22.5 %
M PROTEIN MFR SERPL ELPH: 7.2 G/DL (ref 6.4–8.2)
MCH RBC QN AUTO: 30.2 PG (ref 26–34)
MCHC RBC AUTO-ENTMCNC: 32.4 G/DL (ref 31–37)
MCV RBC AUTO: 93.1 FL (ref 80–100)
MONOCYTES # BLD AUTO: 0.68 X10(3) UL (ref 0.1–1)
MONOCYTES NFR BLD AUTO: 11.6 %
NEUTROPHILS # BLD AUTO: 3.66 X10 (3) UL (ref 1.5–7.7)
NEUTROPHILS # BLD AUTO: 3.66 X10(3) UL (ref 1.5–7.7)
NEUTROPHILS NFR BLD AUTO: 62.5 %
OSMOLALITY SERPL CALC.SUM OF ELEC: 293 MOSM/KG (ref 275–295)
PLATELET # BLD AUTO: 157 10(3)UL (ref 150–450)
POTASSIUM SERPL-SCNC: 3.8 MMOL/L (ref 3.6–5.1)
RBC # BLD AUTO: 3.94 X10(6)UL (ref 4.3–5.7)
SODIUM SERPL-SCNC: 141 MMOL/L (ref 136–144)
WBC # BLD AUTO: 5.9 X10(3) UL (ref 4–11)

## 2019-01-31 PROCEDURE — 96411 CHEMO IV PUSH ADDL DRUG: CPT

## 2019-01-31 PROCEDURE — 99214 OFFICE O/P EST MOD 30 MIN: CPT | Performed by: INTERNAL MEDICINE

## 2019-01-31 PROCEDURE — 96368 THER/DIAG CONCURRENT INF: CPT

## 2019-01-31 PROCEDURE — 96415 CHEMO IV INFUSION ADDL HR: CPT

## 2019-01-31 PROCEDURE — 82378 CARCINOEMBRYONIC ANTIGEN: CPT

## 2019-01-31 PROCEDURE — 85025 COMPLETE CBC W/AUTO DIFF WBC: CPT

## 2019-01-31 PROCEDURE — 96413 CHEMO IV INFUSION 1 HR: CPT

## 2019-01-31 PROCEDURE — 96417 CHEMO IV INFUS EACH ADDL SEQ: CPT

## 2019-01-31 PROCEDURE — 96416 CHEMO PROLONG INFUSE W/PUMP: CPT

## 2019-01-31 PROCEDURE — 96375 TX/PRO/DX INJ NEW DRUG ADDON: CPT

## 2019-01-31 PROCEDURE — 80053 COMPREHEN METABOLIC PANEL: CPT

## 2019-01-31 RX ORDER — ONDANSETRON 2 MG/ML
8 INJECTION INTRAMUSCULAR; INTRAVENOUS EVERY 6 HOURS PRN
Status: CANCELLED | OUTPATIENT
Start: 2019-01-31

## 2019-01-31 RX ORDER — PROCHLORPERAZINE MALEATE 10 MG
10 TABLET ORAL EVERY 6 HOURS PRN
Status: CANCELLED | OUTPATIENT
Start: 2019-01-31

## 2019-01-31 RX ORDER — LORAZEPAM 2 MG/ML
INJECTION INTRAMUSCULAR EVERY 4 HOURS PRN
Status: CANCELLED | OUTPATIENT
Start: 2019-01-31

## 2019-01-31 RX ORDER — FLUOROURACIL 50 MG/ML
400 INJECTION, SOLUTION INTRAVENOUS ONCE
Status: COMPLETED | OUTPATIENT
Start: 2019-01-31 | End: 2019-01-31

## 2019-01-31 RX ORDER — LORAZEPAM 0.5 MG/1
TABLET ORAL EVERY 4 HOURS PRN
Status: CANCELLED | OUTPATIENT
Start: 2019-01-31

## 2019-01-31 RX ORDER — METOCLOPRAMIDE HYDROCHLORIDE 5 MG/ML
10 INJECTION INTRAMUSCULAR; INTRAVENOUS EVERY 6 HOURS PRN
Status: CANCELLED | OUTPATIENT
Start: 2019-01-31

## 2019-01-31 RX ORDER — FLUOROURACIL 50 MG/ML
2400 INJECTION, SOLUTION INTRAVENOUS CONTINUOUS
Status: DISCONTINUED | OUTPATIENT
Start: 2019-01-31 | End: 2019-01-31

## 2019-01-31 RX ADMIN — FLUOROURACIL 950 MG: 50 INJECTION, SOLUTION INTRAVENOUS at 15:07:00

## 2019-01-31 RX ADMIN — FLUOROURACIL 5800 MG: 50 INJECTION, SOLUTION INTRAVENOUS at 15:06:00

## 2019-01-31 NOTE — PROGRESS NOTES
Patient is here for MD f/u and cycle 7 of chemo. Patient is feeling well today. Appetite and energy level is good. Patient still has mild swelling in left leg. Continues on Xarelto for DVT.        Education Record    Learner:  Patient    Disease / Diagnosis

## 2019-02-01 NOTE — PROGRESS NOTES
[address] [place]    PATIENT'S NAME: Real Carter   ATTENDING PHYSICIAN: Ketty Sivla M.D.    PATIENT ACCOUNT #: [de-identified] LOCATION: [address] [place] [address] RECORD #: [id number] YOB: 1965   DATE OF SERVICE: 01/31/2019       CANCER ROSA reversal and see whether or not one of his partners would do it or whether we will wait for Dr. Jeanne Barthel to return. PHYSICAL EXAMINATION:    GENERAL:  He is a well-developed, well-nourished male. He is in no acute distress.   VITAL SIGNS:  Performance sta

## 2019-02-02 ENCOUNTER — NURSE ONLY (OUTPATIENT)
Dept: HEMATOLOGY/ONCOLOGY | Facility: HOSPITAL | Age: 54
End: 2019-02-02
Attending: NURSE PRACTITIONER
Payer: COMMERCIAL

## 2019-02-02 PROCEDURE — 96523 IRRIG DRUG DELIVERY DEVICE: CPT

## 2019-02-14 ENCOUNTER — OFFICE VISIT (OUTPATIENT)
Dept: HEMATOLOGY/ONCOLOGY | Age: 54
End: 2019-02-14
Attending: NURSE PRACTITIONER
Payer: COMMERCIAL

## 2019-02-14 VITALS
TEMPERATURE: 98 F | DIASTOLIC BLOOD PRESSURE: 81 MMHG | WEIGHT: 240 LBS | SYSTOLIC BLOOD PRESSURE: 128 MMHG | BODY MASS INDEX: 28 KG/M2 | OXYGEN SATURATION: 100 % | HEART RATE: 72 BPM | RESPIRATION RATE: 16 BRPM

## 2019-02-14 DIAGNOSIS — C78.01 SECONDARY CARCINOMA OF RIGHT LUNG (HCC): ICD-10-CM

## 2019-02-14 DIAGNOSIS — C20 RECTAL CANCER (HCC): ICD-10-CM

## 2019-02-14 DIAGNOSIS — C78.02 SECONDARY ADENOCARCINOMA OF LEFT LUNG (HCC): ICD-10-CM

## 2019-02-14 DIAGNOSIS — C78.7 SECONDARY LIVER CANCER (HCC): Primary | ICD-10-CM

## 2019-02-14 DIAGNOSIS — C18.7 CANCER OF SIGMOID COLON (HCC): Primary | ICD-10-CM

## 2019-02-14 DIAGNOSIS — C78.7 SECONDARY LIVER CANCER (HCC): ICD-10-CM

## 2019-02-14 LAB
ALBUMIN SERPL-MCNC: 3.2 G/DL (ref 3.4–5)
ALBUMIN/GLOB SERPL: 0.8 {RATIO} (ref 1–2)
ALP LIVER SERPL-CCNC: 62 U/L (ref 45–117)
ALT SERPL-CCNC: 25 U/L (ref 16–61)
ANION GAP SERPL CALC-SCNC: 7 MMOL/L (ref 0–18)
AST SERPL-CCNC: 20 U/L (ref 15–37)
BASOPHILS # BLD AUTO: 0.04 X10(3) UL (ref 0–0.2)
BASOPHILS NFR BLD AUTO: 0.7 %
BILIRUB SERPL-MCNC: 0.3 MG/DL (ref 0.1–2)
BUN BLD-MCNC: 19 MG/DL (ref 7–18)
BUN/CREAT SERPL: 19.2 (ref 10–20)
CALCIUM BLD-MCNC: 7.9 MG/DL (ref 8.5–10.1)
CEA SERPL-MCNC: 15.4 NG/ML (ref ?–5)
CHLORIDE SERPL-SCNC: 112 MMOL/L (ref 98–107)
CO2 SERPL-SCNC: 24 MMOL/L (ref 21–32)
CREAT BLD-MCNC: 0.99 MG/DL (ref 0.7–1.3)
DEPRECATED RDW RBC AUTO: 59.9 FL (ref 35.1–46.3)
EOSINOPHIL # BLD AUTO: 0.25 X10(3) UL (ref 0–0.7)
EOSINOPHIL NFR BLD AUTO: 4.4 %
ERYTHROCYTE [DISTWIDTH] IN BLOOD BY AUTOMATED COUNT: 17.4 % (ref 11–15)
GLOBULIN PLAS-MCNC: 3.8 G/DL (ref 2.8–4.4)
GLUCOSE BLD-MCNC: 120 MG/DL (ref 70–99)
HCT VFR BLD AUTO: 36.2 % (ref 39–53)
HGB BLD-MCNC: 11.8 G/DL (ref 13–17.5)
IMM GRANULOCYTES # BLD AUTO: 0.02 X10(3) UL (ref 0–1)
IMM GRANULOCYTES NFR BLD: 0.3 %
LYMPHOCYTES # BLD AUTO: 1.51 X10(3) UL (ref 1–4)
LYMPHOCYTES NFR BLD AUTO: 26.3 %
M PROTEIN MFR SERPL ELPH: 7 G/DL (ref 6.4–8.2)
MCH RBC QN AUTO: 30.8 PG (ref 26–34)
MCHC RBC AUTO-ENTMCNC: 32.6 G/DL (ref 31–37)
MCV RBC AUTO: 94.5 FL (ref 80–100)
MONOCYTES # BLD AUTO: 0.55 X10(3) UL (ref 0.1–1)
MONOCYTES NFR BLD AUTO: 9.6 %
NEUTROPHILS # BLD AUTO: 3.37 X10 (3) UL (ref 1.5–7.7)
NEUTROPHILS # BLD AUTO: 3.37 X10(3) UL (ref 1.5–7.7)
NEUTROPHILS NFR BLD AUTO: 58.7 %
OSMOLALITY SERPL CALC.SUM OF ELEC: 299 MOSM/KG (ref 275–295)
PLATELET # BLD AUTO: 150 10(3)UL (ref 150–450)
POTASSIUM SERPL-SCNC: 4 MMOL/L (ref 3.5–5.1)
RBC # BLD AUTO: 3.83 X10(6)UL (ref 4.3–5.7)
SODIUM SERPL-SCNC: 143 MMOL/L (ref 136–145)
WBC # BLD AUTO: 5.7 X10(3) UL (ref 4–11)

## 2019-02-14 PROCEDURE — 96411 CHEMO IV PUSH ADDL DRUG: CPT

## 2019-02-14 PROCEDURE — 85025 COMPLETE CBC W/AUTO DIFF WBC: CPT

## 2019-02-14 PROCEDURE — 99214 OFFICE O/P EST MOD 30 MIN: CPT | Performed by: INTERNAL MEDICINE

## 2019-02-14 PROCEDURE — 82378 CARCINOEMBRYONIC ANTIGEN: CPT

## 2019-02-14 PROCEDURE — 80053 COMPREHEN METABOLIC PANEL: CPT

## 2019-02-14 PROCEDURE — 96417 CHEMO IV INFUS EACH ADDL SEQ: CPT

## 2019-02-14 PROCEDURE — 96368 THER/DIAG CONCURRENT INF: CPT

## 2019-02-14 PROCEDURE — 96415 CHEMO IV INFUSION ADDL HR: CPT

## 2019-02-14 PROCEDURE — 96413 CHEMO IV INFUSION 1 HR: CPT

## 2019-02-14 PROCEDURE — 96375 TX/PRO/DX INJ NEW DRUG ADDON: CPT

## 2019-02-14 RX ORDER — ONDANSETRON 2 MG/ML
8 INJECTION INTRAMUSCULAR; INTRAVENOUS EVERY 6 HOURS PRN
Status: CANCELLED | OUTPATIENT
Start: 2019-02-14

## 2019-02-14 RX ORDER — PROCHLORPERAZINE MALEATE 10 MG
10 TABLET ORAL EVERY 6 HOURS PRN
Status: CANCELLED | OUTPATIENT
Start: 2019-02-14

## 2019-02-14 RX ORDER — METOCLOPRAMIDE HYDROCHLORIDE 5 MG/ML
10 INJECTION INTRAMUSCULAR; INTRAVENOUS EVERY 6 HOURS PRN
Status: CANCELLED | OUTPATIENT
Start: 2019-02-14

## 2019-02-14 RX ORDER — FLUOROURACIL 50 MG/ML
2400 INJECTION, SOLUTION INTRAVENOUS CONTINUOUS
Status: DISCONTINUED | OUTPATIENT
Start: 2019-02-14 | End: 2019-02-14

## 2019-02-14 RX ORDER — FLUOROURACIL 50 MG/ML
400 INJECTION, SOLUTION INTRAVENOUS ONCE
Status: CANCELLED | OUTPATIENT
Start: 2019-02-14

## 2019-02-14 RX ORDER — FLUOROURACIL 50 MG/ML
400 INJECTION, SOLUTION INTRAVENOUS ONCE
Status: COMPLETED | OUTPATIENT
Start: 2019-02-14 | End: 2019-02-14

## 2019-02-14 RX ORDER — FLUOROURACIL 50 MG/ML
2400 INJECTION, SOLUTION INTRAVENOUS CONTINUOUS
Status: CANCELLED | OUTPATIENT
Start: 2019-02-14

## 2019-02-14 RX ORDER — LORAZEPAM 0.5 MG/1
TABLET ORAL EVERY 4 HOURS PRN
Status: CANCELLED | OUTPATIENT
Start: 2019-02-14

## 2019-02-14 RX ORDER — LORAZEPAM 2 MG/ML
INJECTION INTRAMUSCULAR EVERY 4 HOURS PRN
Status: CANCELLED | OUTPATIENT
Start: 2019-02-14

## 2019-02-14 RX ADMIN — FLUOROURACIL 5800 MG: 50 INJECTION, SOLUTION INTRAVENOUS at 15:05:00

## 2019-02-14 RX ADMIN — FLUOROURACIL 950 MG: 50 INJECTION, SOLUTION INTRAVENOUS at 15:05:00

## 2019-02-14 NOTE — PROGRESS NOTES
Pt here for C8D1.   Arrives Ambulating independently, accompanied by Self           Modifications in dose or schedule: No     Frequency of blood return and site check throughout administration: Prior to administration, Every 2-3 ml IVP and At completion of

## 2019-02-14 NOTE — PROGRESS NOTES
Patient is here for MD f/cristóbal and cycle 8 of chemo. Appetite and energy level is good. Denies pain. Patient still has swelling in left leg from DVT. Continues on Xarelto. No further concerns.          Education Record    Learner:  Patient    Disease / Bhavik Salguero

## 2019-02-15 NOTE — PROGRESS NOTES
[facility]    PATIENT'S NAME: Mason Washburn   ATTENDING PHYSICIAN: Maia Wahl M.D.    PATIENT ACCOUNT #: [de-identified] LOCATION: [address] [place] [address] RECORD #: [id number] YOB: 1965   DATE OF SERVICE: 02/14/2019       CANCER ROSA p.r.n., prochlorperazine 10 mg q.6 h. p.r.n., rivaroxaban 20 mg daily. PHYSICAL EXAMINATION:    GENERAL:  He is a well-developed, well-nourished male. He is in no acute distress. VITAL SIGNS:  His performance status is 0.   His weight is 240 pounds, w son in [place] the last week of March. I told him we will work around this. Dictated By Obdulio Hay M.D.  d: 02/15/2019 08:07:19  t: 02/15/2019 09:34:45  Job [id number]  ID/    cc: Radhames Mcnair M.D. Marleni Jones.  Karthik Hassan.

## 2019-02-16 ENCOUNTER — NURSE ONLY (OUTPATIENT)
Dept: HEMATOLOGY/ONCOLOGY | Facility: HOSPITAL | Age: 54
End: 2019-02-16
Attending: NURSE PRACTITIONER
Payer: COMMERCIAL

## 2019-02-16 DIAGNOSIS — C20 RECTAL CANCER (HCC): ICD-10-CM

## 2019-02-16 DIAGNOSIS — C78.7 SECONDARY LIVER CANCER (HCC): Primary | ICD-10-CM

## 2019-02-16 DIAGNOSIS — C18.6 CANCER OF DESCENDING COLON (HCC): ICD-10-CM

## 2019-02-16 DIAGNOSIS — C78.01 SECONDARY CARCINOMA OF RIGHT LUNG (HCC): ICD-10-CM

## 2019-02-16 DIAGNOSIS — C78.02 SECONDARY ADENOCARCINOMA OF LEFT LUNG (HCC): ICD-10-CM

## 2019-02-16 PROCEDURE — 96523 IRRIG DRUG DELIVERY DEVICE: CPT

## 2019-02-16 RX ORDER — SODIUM CHLORIDE 0.9 % (FLUSH) 0.9 %
10 SYRINGE (ML) INJECTION ONCE
Status: CANCELLED | OUTPATIENT
Start: 2019-02-16

## 2019-02-16 RX ORDER — SODIUM CHLORIDE 0.9 % (FLUSH) 0.9 %
10 SYRINGE (ML) INJECTION ONCE
Status: COMPLETED | OUTPATIENT
Start: 2019-02-16 | End: 2019-02-16

## 2019-02-16 RX ADMIN — SODIUM CHLORIDE 0.9 % (FLUSH) 10 ML: 0.9 % SYRINGE (ML) INJECTION at 10:55:00

## 2019-02-27 ENCOUNTER — HOSPITAL ENCOUNTER (OUTPATIENT)
Dept: CT IMAGING | Facility: HOSPITAL | Age: 54
Discharge: HOME | End: 2019-02-27
Attending: INTERNAL MEDICINE
Payer: COMMERCIAL

## 2019-02-27 DIAGNOSIS — C18.7 CANCER OF SIGMOID COLON (HCC): ICD-10-CM

## 2019-02-27 LAB — CREAT SERPL-MCNC: 1 MG/DL (ref 0.7–1.3)

## 2019-02-27 PROCEDURE — 74177 CT ABD & PELVIS W/CONTRAST: CPT | Performed by: INTERNAL MEDICINE

## 2019-02-27 PROCEDURE — 82565 ASSAY OF CREATININE: CPT

## 2019-02-27 PROCEDURE — 71260 CT THORAX DX C+: CPT | Performed by: INTERNAL MEDICINE

## 2019-02-28 ENCOUNTER — OFFICE VISIT (OUTPATIENT)
Dept: HEMATOLOGY/ONCOLOGY | Age: 54
End: 2019-02-28
Attending: NURSE PRACTITIONER
Payer: COMMERCIAL

## 2019-02-28 VITALS
TEMPERATURE: 98 F | WEIGHT: 246 LBS | BODY MASS INDEX: 29.05 KG/M2 | HEART RATE: 90 BPM | DIASTOLIC BLOOD PRESSURE: 82 MMHG | OXYGEN SATURATION: 99 % | SYSTOLIC BLOOD PRESSURE: 125 MMHG | HEIGHT: 77.01 IN | RESPIRATION RATE: 18 BRPM

## 2019-02-28 DIAGNOSIS — C78.02 SECONDARY ADENOCARCINOMA OF LEFT LUNG (HCC): ICD-10-CM

## 2019-02-28 DIAGNOSIS — D64.81 ANEMIA DUE TO ANTINEOPLASTIC CHEMOTHERAPY: ICD-10-CM

## 2019-02-28 DIAGNOSIS — C78.01 SECONDARY CARCINOMA OF RIGHT LUNG (HCC): ICD-10-CM

## 2019-02-28 DIAGNOSIS — C78.7 SECONDARY LIVER CANCER (HCC): Primary | ICD-10-CM

## 2019-02-28 DIAGNOSIS — C78.7 SECONDARY LIVER CANCER (HCC): ICD-10-CM

## 2019-02-28 DIAGNOSIS — T45.1X5A ANEMIA DUE TO ANTINEOPLASTIC CHEMOTHERAPY: ICD-10-CM

## 2019-02-28 DIAGNOSIS — C20 RECTAL CANCER (HCC): Primary | ICD-10-CM

## 2019-02-28 DIAGNOSIS — C20 RECTAL CANCER (HCC): ICD-10-CM

## 2019-02-28 LAB
ALBUMIN SERPL-MCNC: 3.2 G/DL (ref 3.4–5)
ALBUMIN/GLOB SERPL: 0.9 {RATIO} (ref 1–2)
ALP LIVER SERPL-CCNC: 59 U/L (ref 45–117)
ALT SERPL-CCNC: 52 U/L (ref 16–61)
ANION GAP SERPL CALC-SCNC: 8 MMOL/L (ref 0–18)
AST SERPL-CCNC: 58 U/L (ref 15–37)
BASOPHILS # BLD AUTO: 0.03 X10(3) UL (ref 0–0.2)
BASOPHILS NFR BLD AUTO: 0.6 %
BILIRUB SERPL-MCNC: 0.4 MG/DL (ref 0.1–2)
BILIRUB UR QL STRIP.AUTO: NEGATIVE
BUN BLD-MCNC: 17 MG/DL (ref 7–18)
BUN/CREAT SERPL: 16.7 (ref 10–20)
CALCIUM BLD-MCNC: 7.9 MG/DL (ref 8.5–10.1)
CEA SERPL-MCNC: 11.1 NG/ML (ref ?–5)
CHLORIDE SERPL-SCNC: 110 MMOL/L (ref 98–107)
CO2 SERPL-SCNC: 24 MMOL/L (ref 21–32)
COLOR UR AUTO: YELLOW
CREAT BLD-MCNC: 1.02 MG/DL (ref 0.7–1.3)
DEPRECATED RDW RBC AUTO: 59.4 FL (ref 35.1–46.3)
EOSINOPHIL # BLD AUTO: 0.22 X10(3) UL (ref 0–0.7)
EOSINOPHIL NFR BLD AUTO: 4.2 %
ERYTHROCYTE [DISTWIDTH] IN BLOOD BY AUTOMATED COUNT: 17.1 % (ref 11–15)
GLOBULIN PLAS-MCNC: 3.7 G/DL (ref 2.8–4.4)
GLUCOSE BLD-MCNC: 98 MG/DL (ref 70–99)
GLUCOSE UR STRIP.AUTO-MCNC: NEGATIVE MG/DL
HCT VFR BLD AUTO: 36.5 % (ref 39–53)
HGB BLD-MCNC: 11.9 G/DL (ref 13–17.5)
IMM GRANULOCYTES # BLD AUTO: 0.01 X10(3) UL (ref 0–1)
IMM GRANULOCYTES NFR BLD: 0.2 %
KETONES UR STRIP.AUTO-MCNC: NEGATIVE MG/DL
LEUKOCYTE ESTERASE UR QL STRIP.AUTO: NEGATIVE
LYMPHOCYTES # BLD AUTO: 1.44 X10(3) UL (ref 1–4)
LYMPHOCYTES NFR BLD AUTO: 27.3 %
M PROTEIN MFR SERPL ELPH: 6.9 G/DL (ref 6.4–8.2)
MCH RBC QN AUTO: 31.2 PG (ref 26–34)
MCHC RBC AUTO-ENTMCNC: 32.6 G/DL (ref 31–37)
MCV RBC AUTO: 95.8 FL (ref 80–100)
MONOCYTES # BLD AUTO: 0.82 X10(3) UL (ref 0.1–1)
MONOCYTES NFR BLD AUTO: 15.5 %
NEUTROPHILS # BLD AUTO: 2.76 X10 (3) UL (ref 1.5–7.7)
NEUTROPHILS # BLD AUTO: 2.76 X10(3) UL (ref 1.5–7.7)
NEUTROPHILS NFR BLD AUTO: 52.2 %
NITRITE UR QL STRIP.AUTO: NEGATIVE
OSMOLALITY SERPL CALC.SUM OF ELEC: 296 MOSM/KG (ref 275–295)
PH UR STRIP.AUTO: 5 [PH] (ref 4.5–8)
PLATELET # BLD AUTO: 136 10(3)UL (ref 150–450)
POTASSIUM SERPL-SCNC: 4.5 MMOL/L (ref 3.5–5.1)
PROT UR STRIP.AUTO-MCNC: NEGATIVE MG/DL
RBC # BLD AUTO: 3.81 X10(6)UL (ref 4.3–5.7)
RBC UR QL AUTO: NEGATIVE
SODIUM SERPL-SCNC: 142 MMOL/L (ref 136–145)
SP GR UR STRIP.AUTO: 1.02 (ref 1–1.03)
UROBILINOGEN UR STRIP.AUTO-MCNC: 0.2 MG/DL
WBC # BLD AUTO: 5.3 X10(3) UL (ref 4–11)

## 2019-02-28 PROCEDURE — 96375 TX/PRO/DX INJ NEW DRUG ADDON: CPT

## 2019-02-28 PROCEDURE — 82378 CARCINOEMBRYONIC ANTIGEN: CPT

## 2019-02-28 PROCEDURE — 96368 THER/DIAG CONCURRENT INF: CPT

## 2019-02-28 PROCEDURE — 85025 COMPLETE CBC W/AUTO DIFF WBC: CPT

## 2019-02-28 PROCEDURE — 81003 URINALYSIS AUTO W/O SCOPE: CPT

## 2019-02-28 PROCEDURE — 80053 COMPREHEN METABOLIC PANEL: CPT

## 2019-02-28 PROCEDURE — 96411 CHEMO IV PUSH ADDL DRUG: CPT

## 2019-02-28 PROCEDURE — 96413 CHEMO IV INFUSION 1 HR: CPT

## 2019-02-28 PROCEDURE — 99214 OFFICE O/P EST MOD 30 MIN: CPT | Performed by: INTERNAL MEDICINE

## 2019-02-28 RX ORDER — METOCLOPRAMIDE HYDROCHLORIDE 5 MG/ML
10 INJECTION INTRAMUSCULAR; INTRAVENOUS EVERY 6 HOURS PRN
Status: CANCELLED | OUTPATIENT
Start: 2019-02-28

## 2019-02-28 RX ORDER — FLUOROURACIL 50 MG/ML
2400 INJECTION, SOLUTION INTRAVENOUS CONTINUOUS
Status: CANCELLED | OUTPATIENT
Start: 2019-02-28

## 2019-02-28 RX ORDER — LORAZEPAM 2 MG/ML
INJECTION INTRAMUSCULAR EVERY 4 HOURS PRN
Status: CANCELLED | OUTPATIENT
Start: 2019-02-28

## 2019-02-28 RX ORDER — ONDANSETRON 2 MG/ML
8 INJECTION INTRAMUSCULAR; INTRAVENOUS EVERY 6 HOURS PRN
Status: CANCELLED | OUTPATIENT
Start: 2019-02-28

## 2019-02-28 RX ORDER — PROCHLORPERAZINE MALEATE 10 MG
10 TABLET ORAL EVERY 6 HOURS PRN
Status: CANCELLED | OUTPATIENT
Start: 2019-02-28

## 2019-02-28 RX ORDER — FLUOROURACIL 50 MG/ML
400 INJECTION, SOLUTION INTRAVENOUS ONCE
Status: COMPLETED | OUTPATIENT
Start: 2019-02-28 | End: 2019-02-28

## 2019-02-28 RX ORDER — FLUOROURACIL 50 MG/ML
400 INJECTION, SOLUTION INTRAVENOUS ONCE
Status: CANCELLED | OUTPATIENT
Start: 2019-02-28

## 2019-02-28 RX ORDER — LORAZEPAM 0.5 MG/1
TABLET ORAL EVERY 4 HOURS PRN
Status: CANCELLED | OUTPATIENT
Start: 2019-02-28

## 2019-02-28 RX ORDER — FLUOROURACIL 50 MG/ML
2400 INJECTION, SOLUTION INTRAVENOUS CONTINUOUS
Status: DISCONTINUED | OUTPATIENT
Start: 2019-02-28 | End: 2019-02-28

## 2019-02-28 RX ADMIN — FLUOROURACIL 5800 MG: 50 INJECTION, SOLUTION INTRAVENOUS at 14:57:00

## 2019-02-28 RX ADMIN — FLUOROURACIL 950 MG: 50 INJECTION, SOLUTION INTRAVENOUS at 14:50:00

## 2019-02-28 NOTE — PROGRESS NOTES
Pt here for C9D1. Arrives Ambulating independently, accompanied by Self           Modifications in dose or schedule: Pt to come back in 2 weeks for chemo instead of 3 weeks.       Frequency of blood return and site check throughout administration: Prior to

## 2019-02-28 NOTE — PROGRESS NOTES
Patient is here for cycle 9 of chemo. Patient had a Ct scan yesterday. Patient has been feeling well. Appetite and energy level is good. Mild edema in left lower extremity. Wearing compression stocking daily. Continues on Xarelto for DVT. No GI complaints.

## 2019-03-02 ENCOUNTER — NURSE ONLY (OUTPATIENT)
Dept: HEMATOLOGY/ONCOLOGY | Facility: HOSPITAL | Age: 54
End: 2019-03-02
Attending: NURSE PRACTITIONER
Payer: COMMERCIAL

## 2019-03-02 DIAGNOSIS — C20 RECTAL CANCER (HCC): ICD-10-CM

## 2019-03-02 DIAGNOSIS — C78.02 SECONDARY ADENOCARCINOMA OF LEFT LUNG (HCC): ICD-10-CM

## 2019-03-02 DIAGNOSIS — C18.6 CANCER OF DESCENDING COLON (HCC): ICD-10-CM

## 2019-03-02 DIAGNOSIS — C78.7 SECONDARY LIVER CANCER (HCC): Primary | ICD-10-CM

## 2019-03-02 DIAGNOSIS — C78.01 SECONDARY CARCINOMA OF RIGHT LUNG (HCC): ICD-10-CM

## 2019-03-02 PROCEDURE — 96523 IRRIG DRUG DELIVERY DEVICE: CPT

## 2019-03-02 RX ORDER — SODIUM CHLORIDE 0.9 % (FLUSH) 0.9 %
10 SYRINGE (ML) INJECTION ONCE
Status: COMPLETED | OUTPATIENT
Start: 2019-03-02 | End: 2019-03-02

## 2019-03-02 RX ORDER — SODIUM CHLORIDE 0.9 % (FLUSH) 0.9 %
10 SYRINGE (ML) INJECTION ONCE
Status: CANCELLED | OUTPATIENT
Start: 2019-03-02

## 2019-03-02 RX ADMIN — SODIUM CHLORIDE 0.9 % (FLUSH) 10 ML: 0.9 % SYRINGE (ML) INJECTION at 10:50:00

## 2019-03-02 NOTE — PROGRESS NOTES
Education Record    Learner:  Patient    Disease / Diagnosis: Rectal Cancer    Barriers / Limitations:  None   Comments:    Method:  Brief focused   Comments:    General Topics:  Plan of care reviewed   Comments:    Outcome:  Shows understanding   Comments

## 2019-03-05 NOTE — PROGRESS NOTES
[facility]    PATIENT'S NAME: Cammie Vega   ATTENDING PHYSICIAN: Anabell Perez M.D.    PATIENT ACCOUNT #: [de-identified] LOCATION: [address] [place] [address] RECORD #: [id number] YOB: 1965   DATE OF SERVICE: 02/28/2019       CANCER ROSA distress. VITAL SIGNS:  His performance status is 0. His weight is 246 pounds. Blood pressure 125/82, pulse 90, respiratory rate 20, temperature 97.9. HEENT:  Unremarkable. He has pink conjunctivae, anicteric sclerae. Pharynx without lesions.   LYMPHA one in 2 weeks in order to work around his upcoming trip. Dictated By Deisy To M.D.  d: 03/05/2019 07:17:53  t: 03/05/2019 12:22:24  [facility] [id number]  /    cc: Lyndle Skiff, M.D. Keri Second.  Candida Carr.

## 2019-03-14 ENCOUNTER — OFFICE VISIT (OUTPATIENT)
Dept: HEMATOLOGY/ONCOLOGY | Age: 54
End: 2019-03-14
Attending: NURSE PRACTITIONER
Payer: COMMERCIAL

## 2019-03-14 VITALS
RESPIRATION RATE: 16 BRPM | HEART RATE: 88 BPM | WEIGHT: 251 LBS | TEMPERATURE: 99 F | OXYGEN SATURATION: 98 % | BODY MASS INDEX: 30 KG/M2 | DIASTOLIC BLOOD PRESSURE: 83 MMHG | SYSTOLIC BLOOD PRESSURE: 123 MMHG

## 2019-03-14 DIAGNOSIS — C78.01 SECONDARY CARCINOMA OF RIGHT LUNG (HCC): ICD-10-CM

## 2019-03-14 DIAGNOSIS — C78.02 SECONDARY ADENOCARCINOMA OF LEFT LUNG (HCC): ICD-10-CM

## 2019-03-14 DIAGNOSIS — C20 RECTAL CANCER (HCC): Primary | ICD-10-CM

## 2019-03-14 DIAGNOSIS — C78.7 SECONDARY LIVER CANCER (HCC): ICD-10-CM

## 2019-03-14 DIAGNOSIS — C20 RECTAL CANCER (HCC): ICD-10-CM

## 2019-03-14 DIAGNOSIS — C78.7 SECONDARY LIVER CANCER (HCC): Primary | ICD-10-CM

## 2019-03-14 LAB
ALBUMIN SERPL-MCNC: 3.4 G/DL (ref 3.4–5)
ALBUMIN/GLOB SERPL: 0.9 {RATIO} (ref 1–2)
ALP LIVER SERPL-CCNC: 60 U/L (ref 45–117)
ALT SERPL-CCNC: 31 U/L (ref 16–61)
ANION GAP SERPL CALC-SCNC: 8 MMOL/L (ref 0–18)
AST SERPL-CCNC: 25 U/L (ref 15–37)
BASOPHILS # BLD AUTO: 0.06 X10(3) UL (ref 0–0.2)
BASOPHILS NFR BLD AUTO: 0.8 %
BILIRUB SERPL-MCNC: 0.4 MG/DL (ref 0.1–2)
BUN BLD-MCNC: 17 MG/DL (ref 7–18)
BUN/CREAT SERPL: 14.3 (ref 10–20)
CALCIUM BLD-MCNC: 8 MG/DL (ref 8.5–10.1)
CEA SERPL-MCNC: 8.8 NG/ML (ref ?–5)
CHLORIDE SERPL-SCNC: 109 MMOL/L (ref 98–107)
CO2 SERPL-SCNC: 24 MMOL/L (ref 21–32)
CREAT BLD-MCNC: 1.19 MG/DL (ref 0.7–1.3)
DEPRECATED RDW RBC AUTO: 57.9 FL (ref 35.1–46.3)
EOSINOPHIL # BLD AUTO: 0.33 X10(3) UL (ref 0–0.7)
EOSINOPHIL NFR BLD AUTO: 4.7 %
ERYTHROCYTE [DISTWIDTH] IN BLOOD BY AUTOMATED COUNT: 16.1 % (ref 11–15)
GLOBULIN PLAS-MCNC: 3.9 G/DL (ref 2.8–4.4)
GLUCOSE BLD-MCNC: 134 MG/DL (ref 70–99)
HCT VFR BLD AUTO: 38.4 % (ref 39–53)
HGB BLD-MCNC: 12.4 G/DL (ref 13–17.5)
IMM GRANULOCYTES # BLD AUTO: 0.04 X10(3) UL (ref 0–1)
IMM GRANULOCYTES NFR BLD: 0.6 %
LYMPHOCYTES # BLD AUTO: 1.61 X10(3) UL (ref 1–4)
LYMPHOCYTES NFR BLD AUTO: 22.8 %
M PROTEIN MFR SERPL ELPH: 7.3 G/DL (ref 6.4–8.2)
MCH RBC QN AUTO: 31.6 PG (ref 26–34)
MCHC RBC AUTO-ENTMCNC: 32.3 G/DL (ref 31–37)
MCV RBC AUTO: 97.7 FL (ref 80–100)
MONOCYTES # BLD AUTO: 0.71 X10(3) UL (ref 0.1–1)
MONOCYTES NFR BLD AUTO: 10.1 %
NEUTROPHILS # BLD AUTO: 4.31 X10 (3) UL (ref 1.5–7.7)
NEUTROPHILS # BLD AUTO: 4.31 X10(3) UL (ref 1.5–7.7)
NEUTROPHILS NFR BLD AUTO: 61 %
OSMOLALITY SERPL CALC.SUM OF ELEC: 296 MOSM/KG (ref 275–295)
PLATELET # BLD AUTO: 153 10(3)UL (ref 150–450)
POTASSIUM SERPL-SCNC: 4 MMOL/L (ref 3.5–5.1)
RBC # BLD AUTO: 3.93 X10(6)UL (ref 4.3–5.7)
SODIUM SERPL-SCNC: 141 MMOL/L (ref 136–145)
WBC # BLD AUTO: 7.1 X10(3) UL (ref 4–11)

## 2019-03-14 PROCEDURE — 96367 TX/PROPH/DG ADDL SEQ IV INF: CPT

## 2019-03-14 PROCEDURE — 96411 CHEMO IV PUSH ADDL DRUG: CPT

## 2019-03-14 PROCEDURE — 82378 CARCINOEMBRYONIC ANTIGEN: CPT

## 2019-03-14 PROCEDURE — 80053 COMPREHEN METABOLIC PANEL: CPT

## 2019-03-14 PROCEDURE — 85025 COMPLETE CBC W/AUTO DIFF WBC: CPT

## 2019-03-14 PROCEDURE — 99214 OFFICE O/P EST MOD 30 MIN: CPT | Performed by: INTERNAL MEDICINE

## 2019-03-14 PROCEDURE — 96413 CHEMO IV INFUSION 1 HR: CPT

## 2019-03-14 PROCEDURE — 96375 TX/PRO/DX INJ NEW DRUG ADDON: CPT

## 2019-03-14 RX ORDER — METOCLOPRAMIDE HYDROCHLORIDE 5 MG/ML
10 INJECTION INTRAMUSCULAR; INTRAVENOUS EVERY 6 HOURS PRN
Status: CANCELLED | OUTPATIENT
Start: 2019-03-14

## 2019-03-14 RX ORDER — LORAZEPAM 2 MG/ML
INJECTION INTRAMUSCULAR EVERY 4 HOURS PRN
Status: CANCELLED | OUTPATIENT
Start: 2019-03-14

## 2019-03-14 RX ORDER — PROCHLORPERAZINE MALEATE 10 MG
10 TABLET ORAL EVERY 6 HOURS PRN
Status: CANCELLED | OUTPATIENT
Start: 2019-03-14

## 2019-03-14 RX ORDER — FLUOROURACIL 50 MG/ML
400 INJECTION, SOLUTION INTRAVENOUS ONCE
Status: COMPLETED | OUTPATIENT
Start: 2019-03-14 | End: 2019-03-14

## 2019-03-14 RX ORDER — ONDANSETRON 2 MG/ML
8 INJECTION INTRAMUSCULAR; INTRAVENOUS EVERY 6 HOURS PRN
Status: CANCELLED | OUTPATIENT
Start: 2019-03-14

## 2019-03-14 RX ORDER — FLUOROURACIL 50 MG/ML
400 INJECTION, SOLUTION INTRAVENOUS ONCE
Status: CANCELLED | OUTPATIENT
Start: 2019-03-14

## 2019-03-14 RX ORDER — FLUOROURACIL 50 MG/ML
2400 INJECTION, SOLUTION INTRAVENOUS CONTINUOUS
Status: DISCONTINUED | OUTPATIENT
Start: 2019-03-14 | End: 2019-03-14

## 2019-03-14 RX ORDER — LORAZEPAM 0.5 MG/1
TABLET ORAL EVERY 4 HOURS PRN
Status: CANCELLED | OUTPATIENT
Start: 2019-03-14

## 2019-03-14 RX ORDER — FLUOROURACIL 50 MG/ML
2400 INJECTION, SOLUTION INTRAVENOUS CONTINUOUS
Status: CANCELLED | OUTPATIENT
Start: 2019-03-14

## 2019-03-14 RX ADMIN — FLUOROURACIL 5800 MG: 50 INJECTION, SOLUTION INTRAVENOUS at 13:05:00

## 2019-03-14 RX ADMIN — FLUOROURACIL 950 MG: 50 INJECTION, SOLUTION INTRAVENOUS at 12:58:00

## 2019-03-14 NOTE — PROGRESS NOTES
Patient is here for MD f/u for rectal cancer and cycle 10 of chemo. Appetite and energy level is good. Mild edema in left leg unchanged. Wearing compression socks daily. Continues on Xarelto daily. Denies any GI complaints.       Education Record    Learner

## 2019-03-15 NOTE — PROGRESS NOTES
[facility]    PATIENT'S NAME: Mason Washburn   ATTENDING PHYSICIAN: Maia Wahl M.D.    PATIENT ACCOUNT #: [de-identified] LOCATION: [address] [place] [address] RECORD #: [id number] YOB: 1965   DATE OF SERVICE: 03/14/2019       CANCER ROSA filter prior to his surgery. This will be done after he returns from his upcoming vacation. His current medications include a multivitamin, ondansetron, prochlorperazine, and rivaroxaban.     PHYSICAL EXAMINATION:    GENERAL:  He is a well-developed, well

## 2019-03-16 ENCOUNTER — NURSE ONLY (OUTPATIENT)
Dept: HEMATOLOGY/ONCOLOGY | Facility: HOSPITAL | Age: 54
End: 2019-03-16
Attending: NURSE PRACTITIONER
Payer: COMMERCIAL

## 2019-03-16 DIAGNOSIS — C18.6 CANCER OF DESCENDING COLON (HCC): ICD-10-CM

## 2019-03-16 PROCEDURE — 96523 IRRIG DRUG DELIVERY DEVICE: CPT

## 2019-03-16 NOTE — PROGRESS NOTES
Education Record    Learner:  Patient    Disease / Diagnosis: 5FU continuous infusion pump disconnect    Barriers / Limitations:  None   Comments:    Method:  Brief focused, Discussion, Printed material and Video   Comments:    General Topics:  Pain, Proce

## 2019-04-04 ENCOUNTER — OFFICE VISIT (OUTPATIENT)
Dept: HEMATOLOGY/ONCOLOGY | Age: 54
End: 2019-04-04
Attending: INTERNAL MEDICINE
Payer: COMMERCIAL

## 2019-04-04 VITALS
TEMPERATURE: 98 F | BODY MASS INDEX: 30 KG/M2 | OXYGEN SATURATION: 100 % | DIASTOLIC BLOOD PRESSURE: 95 MMHG | HEART RATE: 82 BPM | SYSTOLIC BLOOD PRESSURE: 136 MMHG | WEIGHT: 254.5 LBS | RESPIRATION RATE: 16 BRPM

## 2019-04-04 DIAGNOSIS — C20 RECTAL CANCER (HCC): ICD-10-CM

## 2019-04-04 DIAGNOSIS — I82.409 ACUTE DEEP VEIN THROMBOSIS (DVT) OF LOWER EXTREMITY, UNSPECIFIED LATERALITY, UNSPECIFIED VEIN (HCC): Primary | ICD-10-CM

## 2019-04-04 DIAGNOSIS — C78.7 SECONDARY LIVER CANCER (HCC): Primary | ICD-10-CM

## 2019-04-04 DIAGNOSIS — C78.01 SECONDARY CARCINOMA OF RIGHT LUNG (HCC): ICD-10-CM

## 2019-04-04 DIAGNOSIS — C78.02 SECONDARY ADENOCARCINOMA OF LEFT LUNG (HCC): ICD-10-CM

## 2019-04-04 PROCEDURE — 85025 COMPLETE CBC W/AUTO DIFF WBC: CPT

## 2019-04-04 PROCEDURE — 99214 OFFICE O/P EST MOD 30 MIN: CPT | Performed by: INTERNAL MEDICINE

## 2019-04-04 PROCEDURE — 96367 TX/PROPH/DG ADDL SEQ IV INF: CPT

## 2019-04-04 PROCEDURE — 96409 CHEMO IV PUSH SNGL DRUG: CPT

## 2019-04-04 PROCEDURE — 96375 TX/PRO/DX INJ NEW DRUG ADDON: CPT

## 2019-04-04 PROCEDURE — 82378 CARCINOEMBRYONIC ANTIGEN: CPT

## 2019-04-04 PROCEDURE — 80053 COMPREHEN METABOLIC PANEL: CPT

## 2019-04-04 RX ORDER — FLUOROURACIL 50 MG/ML
400 INJECTION, SOLUTION INTRAVENOUS ONCE
Status: CANCELLED | OUTPATIENT
Start: 2019-04-04

## 2019-04-04 RX ORDER — FLUOROURACIL 50 MG/ML
2400 INJECTION, SOLUTION INTRAVENOUS CONTINUOUS
Status: DISCONTINUED | OUTPATIENT
Start: 2019-04-04 | End: 2019-04-04

## 2019-04-04 RX ORDER — ONDANSETRON 2 MG/ML
8 INJECTION INTRAMUSCULAR; INTRAVENOUS EVERY 6 HOURS PRN
Status: CANCELLED | OUTPATIENT
Start: 2019-04-04

## 2019-04-04 RX ORDER — FLUOROURACIL 50 MG/ML
400 INJECTION, SOLUTION INTRAVENOUS ONCE
Status: COMPLETED | OUTPATIENT
Start: 2019-04-04 | End: 2019-04-04

## 2019-04-04 RX ORDER — FLUOROURACIL 50 MG/ML
2400 INJECTION, SOLUTION INTRAVENOUS CONTINUOUS
Status: CANCELLED | OUTPATIENT
Start: 2019-04-04

## 2019-04-04 RX ORDER — LORAZEPAM 2 MG/ML
INJECTION INTRAMUSCULAR EVERY 4 HOURS PRN
Status: CANCELLED | OUTPATIENT
Start: 2019-04-04

## 2019-04-04 RX ORDER — PROCHLORPERAZINE MALEATE 10 MG
10 TABLET ORAL EVERY 6 HOURS PRN
Status: CANCELLED | OUTPATIENT
Start: 2019-04-04

## 2019-04-04 RX ORDER — METOCLOPRAMIDE HYDROCHLORIDE 5 MG/ML
10 INJECTION INTRAMUSCULAR; INTRAVENOUS EVERY 6 HOURS PRN
Status: CANCELLED | OUTPATIENT
Start: 2019-04-04

## 2019-04-04 RX ORDER — LORAZEPAM 0.5 MG/1
TABLET ORAL EVERY 4 HOURS PRN
Status: CANCELLED | OUTPATIENT
Start: 2019-04-04

## 2019-04-04 RX ADMIN — FLUOROURACIL 950 MG: 50 INJECTION, SOLUTION INTRAVENOUS at 11:35:00

## 2019-04-04 RX ADMIN — FLUOROURACIL 5800 MG: 50 INJECTION, SOLUTION INTRAVENOUS at 11:44:00

## 2019-04-04 NOTE — PROGRESS NOTES
[address]    PATIENT'S NAME: Real Carter   ATTENDING PHYSICIAN: Ketty Silav M.D.    PATIENT ACCOUNT #: [de-identified] LOCATION: [address] [place] [address] RECORD #: [id number] YOB: 1965   DATE OF SERVICE: 04/04/2019       CANCER ROSA PHYSICAL EXAMINATION:    GENERAL:  He is a well-developed, well-nourished male. He is in no acute distress. VITAL SIGNS:  His performance status is 0. His weight is 246 pounds.   Blood pressure is 125/82, pulse 90, respiratory rate 20, temperature 97

## 2019-04-04 NOTE — PROGRESS NOTES
Pt here for C11D1.   Arrives Ambulating independently, accompanied by Self           Modifications in dose or schedule: No     Frequency of blood return and site check throughout administration: Prior to administration and At completion of therapy   Dischar

## 2019-04-04 NOTE — PROGRESS NOTES
Patient is here for MD f/u and cycle 11. Patient c/o swelling and tingling in left foot. Continues on Xarelto for left leg DVT. Feeling well otherwise. Appetite and energy level is good.        Education Record    Learner:  Patient    Disease / Diagnosis:

## 2019-04-06 ENCOUNTER — NURSE ONLY (OUTPATIENT)
Dept: HEMATOLOGY/ONCOLOGY | Facility: HOSPITAL | Age: 54
End: 2019-04-06
Attending: INTERNAL MEDICINE
Payer: COMMERCIAL

## 2019-04-06 PROCEDURE — 96523 IRRIG DRUG DELIVERY DEVICE: CPT

## 2019-04-06 NOTE — PROGRESS NOTES
Education Record    Learner:  Patient    Disease / Diagnosis:    Barriers / Limitations:  None   Comments:    Method:  Brief focused and Discussion   Comments:    General Topics:  Plan of care reviewed   Comments:    Outcome:  Shows understanding   Comment

## 2019-04-08 ENCOUNTER — TELEPHONE (OUTPATIENT)
Dept: SURGERY | Facility: CLINIC | Age: 54
End: 2019-04-08

## 2019-04-17 ENCOUNTER — HOSPITAL ENCOUNTER (OUTPATIENT)
Dept: INTERVENTIONAL RADIOLOGY/VASCULAR | Facility: HOSPITAL | Age: 54
Discharge: HOME | End: 2019-04-17
Attending: INTERNAL MEDICINE | Admitting: INTERNAL MEDICINE
Payer: COMMERCIAL

## 2019-04-17 VITALS
OXYGEN SATURATION: 98 % | DIASTOLIC BLOOD PRESSURE: 72 MMHG | SYSTOLIC BLOOD PRESSURE: 118 MMHG | RESPIRATION RATE: 15 BRPM | TEMPERATURE: 98 F | HEART RATE: 70 BPM

## 2019-04-17 DIAGNOSIS — I82.409 ACUTE DEEP VEIN THROMBOSIS (DVT) OF LOWER EXTREMITY, UNSPECIFIED LATERALITY, UNSPECIFIED VEIN (HCC): ICD-10-CM

## 2019-04-17 PROCEDURE — 37191 INS ENDOVAS VENA CAVA FILTR: CPT

## 2019-04-17 PROCEDURE — 85610 PROTHROMBIN TIME: CPT

## 2019-04-17 PROCEDURE — 06H03DZ INSERTION OF INTRALUMINAL DEVICE INTO INFERIOR VENA CAVA, PERCUTANEOUS APPROACH: ICD-10-PCS | Performed by: RADIOLOGY

## 2019-04-17 RX ORDER — SODIUM CHLORIDE 9 MG/ML
INJECTION, SOLUTION INTRAVENOUS CONTINUOUS
Status: DISCONTINUED | OUTPATIENT
Start: 2019-04-17 | End: 2019-04-17

## 2019-04-17 RX ORDER — LIDOCAINE HYDROCHLORIDE 10 MG/ML
INJECTION, SOLUTION INFILTRATION; PERINEURAL
Status: COMPLETED
Start: 2019-04-17 | End: 2019-04-17

## 2019-04-17 RX ORDER — HEPARIN SODIUM 5000 [USP'U]/ML
INJECTION, SOLUTION INTRAVENOUS; SUBCUTANEOUS
Status: COMPLETED
Start: 2019-04-17 | End: 2019-04-17

## 2019-04-17 RX ORDER — MIDAZOLAM HYDROCHLORIDE 1 MG/ML
INJECTION INTRAMUSCULAR; INTRAVENOUS
Status: COMPLETED
Start: 2019-04-17 | End: 2019-04-17

## 2019-04-17 RX ADMIN — SODIUM CHLORIDE: 9 INJECTION, SOLUTION INTRAVENOUS at 09:47:00

## 2019-04-17 NOTE — PROGRESS NOTES
Pt arrived from IR via bed. A/o neck dressing C/D/I and soft    1259: port d/cd after heplock flush. Discharge instructions reviewed. Site remains c/d/i and soft.  Filter card given to pt.pt discharged to home in stable condition

## 2019-04-22 ENCOUNTER — OFFICE VISIT (OUTPATIENT)
Dept: SURGERY | Facility: CLINIC | Age: 54
End: 2019-04-22
Payer: COMMERCIAL

## 2019-04-22 VITALS
BODY MASS INDEX: 30 KG/M2 | SYSTOLIC BLOOD PRESSURE: 138 MMHG | WEIGHT: 254 LBS | DIASTOLIC BLOOD PRESSURE: 89 MMHG | HEART RATE: 68 BPM

## 2019-04-22 DIAGNOSIS — D12.6 ADENOMATOUS POLYP OF COLON, UNSPECIFIED PART OF COLON: ICD-10-CM

## 2019-04-22 DIAGNOSIS — C78.7 SECONDARY LIVER CANCER (HCC): ICD-10-CM

## 2019-04-22 DIAGNOSIS — C78.02 SECONDARY ADENOCARCINOMA OF LEFT LUNG (HCC): ICD-10-CM

## 2019-04-22 DIAGNOSIS — Z93.2 ILEOSTOMY IN PLACE (HCC): ICD-10-CM

## 2019-04-22 DIAGNOSIS — C18.6 CANCER OF DESCENDING COLON (HCC): ICD-10-CM

## 2019-04-22 DIAGNOSIS — C20 RECTAL CANCER (HCC): Primary | ICD-10-CM

## 2019-04-22 DIAGNOSIS — C78.01 SECONDARY CARCINOMA OF RIGHT LUNG (HCC): ICD-10-CM

## 2019-04-22 PROCEDURE — 99213 OFFICE O/P EST LOW 20 MIN: CPT | Performed by: COLON & RECTAL SURGERY

## 2019-04-22 RX ORDER — NEOMYCIN SULFATE 500 MG/1
TABLET ORAL
Qty: 6 TABLET | Refills: 0 | Status: ON HOLD | OUTPATIENT
Start: 2019-04-22 | End: 2019-04-27

## 2019-04-22 RX ORDER — METRONIDAZOLE 500 MG/1
TABLET ORAL
Qty: 3 TABLET | Refills: 0 | Status: ON HOLD | OUTPATIENT
Start: 2019-04-22 | End: 2019-04-27

## 2019-04-22 NOTE — PATIENT INSTRUCTIONS
Mariza Eden is a 48year old male here for discussion on a take down ileostomy scheduled for April 24, 2019. He had a sigmoidoscopy on April 11, 2019, for surgical mapping. The patient history is listed below:      This patient's history starts patient. All questions from the patient were answered in detail. A description of the procedure and its possible outcomes was fully discussed. The patient seemed to understand the conversation and its details.     Consent for surgery was confirmed with t

## 2019-04-22 NOTE — PROGRESS NOTES
Follow Up Visit Note       Active Problems      1. Rectal cancer (HCC) at 15 cm between the second and third rectal folds    2. Secondary adenocarcinoma of left lung ([address]    3. Secondary carcinoma of right lung ([address]    4.  Secondary liver cancer ([address] December 31, 2018. He is now taking Xarelto. This was thought to be secondary from Avastin during his chemotherapy. My total face time with this patient was 30 minutes.   Greater than half of our visit was spent in counseling the patient on the above lis Alcohol use: Yes        Frequency: Never        Comment: 1 beer per day      Drug use: No    Other Topics      Concerns:       Current Outpatient Medications:   •  ondansetron 8 MG Oral Tablet Dispersible, Take 1 tablet (8 mg total) by mouth every 8 (ei well-developed and well-nourished. No distress. HENT:   Head: Normocephalic and atraumatic. Eyes: EOM are normal.   Neck: Normal range of motion. Pulmonary/Chest: Effort normal. No respiratory distress. Abdominal: Soft.  Normal appearance and bowel descending colon in October 2018. The patient will undergo a takedown ileostomy procedure. This procedure will be scheduled for April 24, 2019. He will need [place] IVC filter for this procedure.     All risks, benefits, complications and alternative

## 2019-04-24 ENCOUNTER — ANESTHESIA EVENT (OUTPATIENT)
Dept: SURGERY | Facility: HOSPITAL | Age: 54
End: 2019-04-24

## 2019-04-24 ENCOUNTER — HOSPITAL ENCOUNTER (INPATIENT)
Facility: HOSPITAL | Age: 54
LOS: 3 days | Discharge: HOME | DRG: 330 | End: 2019-04-27
Attending: COLON & RECTAL SURGERY | Admitting: COLON & RECTAL SURGERY
Payer: COMMERCIAL

## 2019-04-24 ENCOUNTER — ANESTHESIA (OUTPATIENT)
Dept: SURGERY | Facility: HOSPITAL | Age: 54
End: 2019-04-24

## 2019-04-24 DIAGNOSIS — Z93.2 ILEOSTOMY IN PLACE (HCC): ICD-10-CM

## 2019-04-24 DIAGNOSIS — C20 RECTAL CANCER (HCC): ICD-10-CM

## 2019-04-24 PROCEDURE — 0DBB0ZZ EXCISION OF ILEUM, OPEN APPROACH: ICD-10-PCS | Performed by: COLON & RECTAL SURGERY

## 2019-04-24 RX ORDER — ACETAMINOPHEN 500 MG
1000 TABLET ORAL ONCE
Status: DISCONTINUED | OUTPATIENT
Start: 2019-04-24 | End: 2019-04-24 | Stop reason: HOSPADM

## 2019-04-24 RX ORDER — METOCLOPRAMIDE HYDROCHLORIDE 5 MG/ML
10 INJECTION INTRAMUSCULAR; INTRAVENOUS AS NEEDED
Status: DISCONTINUED | OUTPATIENT
Start: 2019-04-24 | End: 2019-04-24 | Stop reason: HOSPADM

## 2019-04-24 RX ORDER — HYDROMORPHONE HYDROCHLORIDE 1 MG/ML
0.8 INJECTION, SOLUTION INTRAMUSCULAR; INTRAVENOUS; SUBCUTANEOUS EVERY 2 HOUR PRN
Status: DISCONTINUED | OUTPATIENT
Start: 2019-04-24 | End: 2019-04-27

## 2019-04-24 RX ORDER — HEPARIN SODIUM 5000 [USP'U]/ML
5000 INJECTION, SOLUTION INTRAVENOUS; SUBCUTANEOUS ONCE
Status: COMPLETED | OUTPATIENT
Start: 2019-04-24 | End: 2019-04-24

## 2019-04-24 RX ORDER — SODIUM CHLORIDE, SODIUM LACTATE, POTASSIUM CHLORIDE, CALCIUM CHLORIDE 600; 310; 30; 20 MG/100ML; MG/100ML; MG/100ML; MG/100ML
INJECTION, SOLUTION INTRAVENOUS CONTINUOUS
Status: DISCONTINUED | OUTPATIENT
Start: 2019-04-24 | End: 2019-04-24 | Stop reason: HOSPADM

## 2019-04-24 RX ORDER — HYDROMORPHONE HYDROCHLORIDE 1 MG/ML
0.2 INJECTION, SOLUTION INTRAMUSCULAR; INTRAVENOUS; SUBCUTANEOUS EVERY 2 HOUR PRN
Status: DISCONTINUED | OUTPATIENT
Start: 2019-04-24 | End: 2019-04-27

## 2019-04-24 RX ORDER — HYDROMORPHONE HYDROCHLORIDE 1 MG/ML
0.4 INJECTION, SOLUTION INTRAMUSCULAR; INTRAVENOUS; SUBCUTANEOUS EVERY 2 HOUR PRN
Status: DISCONTINUED | OUTPATIENT
Start: 2019-04-24 | End: 2019-04-27

## 2019-04-24 RX ORDER — MEPERIDINE HYDROCHLORIDE 25 MG/ML
12.5 INJECTION INTRAMUSCULAR; INTRAVENOUS; SUBCUTANEOUS AS NEEDED
Status: DISCONTINUED | OUTPATIENT
Start: 2019-04-24 | End: 2019-04-24 | Stop reason: HOSPADM

## 2019-04-24 RX ORDER — ACETAMINOPHEN 325 MG/1
650 TABLET ORAL EVERY 4 HOURS PRN
Status: DISCONTINUED | OUTPATIENT
Start: 2019-04-24 | End: 2019-04-27

## 2019-04-24 RX ORDER — DEXTROSE, SODIUM CHLORIDE, AND POTASSIUM CHLORIDE 5; .45; .15 G/100ML; G/100ML; G/100ML
INJECTION INTRAVENOUS CONTINUOUS
Status: DISCONTINUED | OUTPATIENT
Start: 2019-04-24 | End: 2019-04-27

## 2019-04-24 RX ORDER — HYDROCODONE BITARTRATE AND ACETAMINOPHEN 5; 325 MG/1; MG/1
2 TABLET ORAL EVERY 4 HOURS PRN
Status: DISCONTINUED | OUTPATIENT
Start: 2019-04-24 | End: 2019-04-27

## 2019-04-24 RX ORDER — HEPARIN SODIUM 5000 [USP'U]/ML
5000 INJECTION, SOLUTION INTRAVENOUS; SUBCUTANEOUS EVERY 12 HOURS SCHEDULED
Status: COMPLETED | OUTPATIENT
Start: 2019-04-24 | End: 2019-04-25

## 2019-04-24 RX ORDER — BUPIVACAINE HYDROCHLORIDE AND EPINEPHRINE 5; 5 MG/ML; UG/ML
INJECTION, SOLUTION EPIDURAL; INTRACAUDAL; PERINEURAL AS NEEDED
Status: DISCONTINUED | OUTPATIENT
Start: 2019-04-24 | End: 2019-04-24 | Stop reason: HOSPADM

## 2019-04-24 RX ORDER — HYDROCODONE BITARTRATE AND ACETAMINOPHEN 5; 325 MG/1; MG/1
1 TABLET ORAL EVERY 4 HOURS PRN
Status: DISCONTINUED | OUTPATIENT
Start: 2019-04-24 | End: 2019-04-27

## 2019-04-24 RX ORDER — ONDANSETRON 2 MG/ML
4 INJECTION INTRAMUSCULAR; INTRAVENOUS EVERY 6 HOURS PRN
Status: DISCONTINUED | OUTPATIENT
Start: 2019-04-24 | End: 2019-04-27

## 2019-04-24 RX ORDER — NALOXONE HYDROCHLORIDE 0.4 MG/ML
80 INJECTION, SOLUTION INTRAMUSCULAR; INTRAVENOUS; SUBCUTANEOUS AS NEEDED
Status: DISCONTINUED | OUTPATIENT
Start: 2019-04-24 | End: 2019-04-24 | Stop reason: HOSPADM

## 2019-04-24 RX ORDER — ONDANSETRON 2 MG/ML
4 INJECTION INTRAMUSCULAR; INTRAVENOUS AS NEEDED
Status: DISCONTINUED | OUTPATIENT
Start: 2019-04-24 | End: 2019-04-24 | Stop reason: HOSPADM

## 2019-04-24 RX ORDER — HYDROMORPHONE HYDROCHLORIDE 1 MG/ML
0.4 INJECTION, SOLUTION INTRAMUSCULAR; INTRAVENOUS; SUBCUTANEOUS EVERY 5 MIN PRN
Status: DISCONTINUED | OUTPATIENT
Start: 2019-04-24 | End: 2019-04-24 | Stop reason: HOSPADM

## 2019-04-24 RX ORDER — MIDAZOLAM HYDROCHLORIDE 1 MG/ML
1 INJECTION INTRAMUSCULAR; INTRAVENOUS EVERY 5 MIN PRN
Status: DISCONTINUED | OUTPATIENT
Start: 2019-04-24 | End: 2019-04-24 | Stop reason: HOSPADM

## 2019-04-24 NOTE — OPERATIVE REPORT
[facility]  Operative Note    Orlando Leon Location: OR   [facility] [id number] MRN [id number]   Admission Date 4/24/2019 Operation Date 4/24/2019   Attending Physician Benton Covington MD Operating Physician Bao Medrano MD     Pre-Operative Diagnosis: Ile the peritoneal cavity and freed up the limbs of the ileostomy both proximal and distal with careful blunt and sharp dissection when necessary.     Once we were able to free up to equal lengths of bowel outside the incision site, we then amputated the loops

## 2019-04-24 NOTE — H&P
History of Present Illness  Christian Marks is a 48year old male here for discussion on a take down ileostomy scheduled for April 24, 2019. He had a sigmoidoscopy on April 11, 2019, for surgical mapping.      The patient history is listed below: venous thrombosis) (HCC)       left leg dvt   • High cholesterol       borderline   • Personal history of antineoplastic chemotherapy       last chemo 4/4/2019   • Visual impairment       glasses/contacts            Past Surgical History:   Procedure Later weight change. HENT: Negative for hearing loss, nosebleeds, sore throat and trouble swallowing. Respiratory: Negative for apnea, cough, shortness of breath and wheezing. Cardiovascular: Negative for chest pain, palpitations and leg swelling.    Arpit present. Ileostomy is present. Musculoskeletal: Normal range of motion. Neurological: He is alert and oriented to person, place, and time. Skin: Skin is dry. Psychiatric: He has a normal mood and affect.  Thought content normal.   Nursing note and fold at 15 cm from anal verge. It is a two thirds circumferential tumor that is ulcerated in its center. It is now biopsy-proven to be adenocarcinoma, moderately differentiated. It is beyond the reach of a digital rectal examination.   It is bulky on the History    Marital status:              Spouse name:                       Years of education:                 Number of children:                Social History Main Topics    Smoking status: Never Smoker sounds. No guarding or rebound. No masses. No ascites. Liver and spleen are not palpable. There are no inguinal or umbilical hernias present. There are no scars in the abdomen. Nursing note and vitals reviewed.            Assessment   Rectal cancer sigmoidoscopy to place a juan miguel at the scar from the polyp that was found at 30 cm from anal verge. This was done prior to the time of this dictation, the next day after this visit, and the polyp base was found to be at 40 cm from anal verge.   An ink tattoo

## 2019-04-24 NOTE — ANESTHESIA PREPROCEDURE EVALUATION
PRE-OP EVALUATION    Patient Name: Adrien Calle    Pre-op Diagnosis: Ileostomy in place [facility]) [Z93.2]  Rectal cancer ([facility] [address] [C20]    Procedure(s):  TAKE DOWN ILEOSTOMY    Surgeon(s) and Role:     Walter Donis MD - Primary    Pre-op vitals reviewed. LAPAROSCOPIC LOW ANTERIOR COLON RESECTION N/A 10/12/2018    Performed by Anthony Noel MD at [facility] MAIN OR     Social History    Tobacco Use      Smoking status: Never Smoker      Smokeless tobacco: Never Used    Alcohol use: Yes      Frequency: Never      C

## 2019-04-24 NOTE — PROGRESS NOTES
Vss. Pt a&ox3. Pt on ra with 02 sats wnl. Lungs cta. Pt denies difficulty breathing or sob. Pt denies chest pain. Pt denies n/v. Tolerating water well at present. Abdomen rounded but soft. bs hypoactive on auscultation.  Pt denies passing flatus and last bm

## 2019-04-25 NOTE — PLAN OF CARE
Pt tolerated FL diet; advanced to soft as ordered by Dr Beck Cintron. Abd incision cleaned with CHG cloths.

## 2019-04-25 NOTE — PLAN OF CARE
Problem: PAIN - ADULT  Goal: Verbalizes/displays adequate comfort level or patient's stated pain goal  Description  INTERVENTIONS:  - Encourage pt to monitor pain and request assistance  - Assess pain using appropriate pain scale  - Administer analges appropriate  - Identify discharge learning needs (meds, wound care, etc)  - Arrange for interpreters to assist at discharge as needed  - Consider post-discharge preferences of patient/family/discharge partner  - Complete POLST form as appropriate  - Assess

## 2019-04-25 NOTE — PLAN OF CARE
Pt sleeping at this time. Pt ambulated to the healing garden with his family - stayed for one hour. Came back to unit and rated pain as mild - declined any analgesia. Pt stated he tolerated soft diet for lunch. Pt was given gum.

## 2019-04-25 NOTE — PLAN OF CARE
Problem: Patient/Family Goals  Goal: Patient/Family Long Term Goal  Description  Patient's Long Term Goal:     Interventions:  -   - See additional Care Plan goals for specific interventions  Outcome: Progressing  Goal: Patient/Family Short Term Goal  Toya Calabrese assist with strengthening/mobility  - Encourage toileting schedule  Outcome: Progressing     Problem: DISCHARGE PLANNING  Goal: Discharge to home or other facility with appropriate resources  Description  INTERVENTIONS:  - Identify barriers to discharge w/

## 2019-04-26 ENCOUNTER — PATIENT OUTREACH (OUTPATIENT)
Dept: SURGERY | Facility: CLINIC | Age: 54
End: 2019-04-26

## 2019-04-26 NOTE — PLAN OF CARE
VSS, afebrile. Tolerating soft diet w/o increased pain or bloating. RUQ incision clean/intact, CHG cleanse performed by day RN. Denies passing gas, or surgical pain. Up ad vibha, voiding w/o issue, breathing exercises encouraged. Will monitor.     Proble

## 2019-04-26 NOTE — PLAN OF CARE
Problem: Patient/Family Goals  Goal: Patient/Family Long Term Goal  Description  Patient's Long Term Goal: \"Home\"    Interventions:  - Vitals & labs stable  - Tolerating soft diet  - Pain controlled on PO pain meds  - Return of bowel function  - Ambula indicated by assessment.  - Educate pt/family on patient safety including physical limitations  - Instruct pt to call for assistance with activity based on assessment  - Modify environment to reduce risk of injury  - Provide assistive devices as appropriat

## 2019-04-26 NOTE — PROGRESS NOTES
[facility]  Progress Note  Delayed entry for 19    Horace Platt Patient Status:  Inpatient    1965 MRN [id number]   [facility] 3NW-A Attending Olga Alexis MD   Hosp Day # 2 PCP Malgorzata Ramos MD     Subjective:  No new nick.     Warrick Dancer  4/25/2019  8 AM

## 2019-04-26 NOTE — PROGRESS NOTES
[facility]  Progress Note    Magalie Platt Patient Status:  Inpatient    1965 MRN [id number]   [facility] 3NW-A Attending Roxi Baker MD   Hosp Day # 2 PCP Cesar Barahona MD     Subjective:  No new complaints, mild incisional Ileostomy  Expected Ileus has not yet resolved    Plan:  1. Awaiting return of bowel function, tolerating diet, pain well controlled, no flatus or BM yet  2. Encouraged continued ambulation  3.  Poss D/C tomorrow if patient begins passing large amounts of f

## 2019-04-27 VITALS
HEIGHT: 77 IN | HEART RATE: 92 BPM | BODY MASS INDEX: 30.17 KG/M2 | OXYGEN SATURATION: 100 % | WEIGHT: 255.5 LBS | SYSTOLIC BLOOD PRESSURE: 131 MMHG | RESPIRATION RATE: 18 BRPM | DIASTOLIC BLOOD PRESSURE: 85 MMHG | TEMPERATURE: 98 F

## 2019-04-27 RX ORDER — HYDROCODONE BITARTRATE AND ACETAMINOPHEN 5; 325 MG/1; MG/1
1 TABLET ORAL EVERY 6 HOURS PRN
Qty: 15 TABLET | Refills: 0 | Status: SHIPPED | OUTPATIENT
Start: 2019-04-27 | End: 2019-05-03

## 2019-04-27 NOTE — DISCHARGE SUMMARY
[facility]  Discharge Summary    Mary Platt Patient Status:  Inpatient    1965 MRN [id number]   [facility] 3NW-A Attending Ange Bentley MD   [facility] Day # 3 PCP Alma Chen MD     Date of Admission: 2019    Date of Dis course in conduct of his workup he is found to have stage IV disease with metastasis to the liver and the lungs bilaterally.     The metastatic disease was confirmed on CT scan obtained on November 3, 2018.  The patient was subsequently started on FOLFOX a tablet  Take 1 tablet (10 mg total) by mouth every 6 (six) hours as needed for Nausea.   Qty: 30 tablet Refills: 3      STOP taking these medications    Acetaminophen (ACETAMINOPHEN EXTRA STRENGTH) 500 MG Oral Cap    metRONIDAZOLE (FLAGYL) 500 MG Oral Tab

## 2019-04-27 NOTE — PLAN OF CARE
States he is ready for discharge  Tolerating low residue diet. Passing gas, having bm's  Denies c/o pain  Afebrile  Xarelto started this am.  New med info given to pt. Verbalized understanding. Up ad vibha with steady gait. Pt has good safety awareness.

## 2019-04-27 NOTE — PROGRESS NOTES
[facility]  Progress Note    Eamon Platt Patient Status:  Inpatient    1965 MRN [id number]   [facility] 3NW-A Attending Nick Valle MD   Hosp Day # 3 PCP Derick Banuelos MD     Subjective:  Patient has had two BMs beginning week      My total face time with this patient was 20 minutes. Greater than half of our visit was spent in counseling the patient on the above listed diagnoses and treatment options.     Shane Robison  4/27/2019  9:43 AM

## 2019-05-01 PROBLEM — K56.609 SBO (SMALL BOWEL OBSTRUCTION) (HCC): Status: RESOLVED | Noted: 2018-11-24 | Resolved: 2019-05-01

## 2019-05-03 ENCOUNTER — OFFICE VISIT (OUTPATIENT)
Dept: SURGERY | Facility: CLINIC | Age: 54
End: 2019-05-03

## 2019-05-03 VITALS
TEMPERATURE: 98 F | SYSTOLIC BLOOD PRESSURE: 119 MMHG | BODY MASS INDEX: 30 KG/M2 | DIASTOLIC BLOOD PRESSURE: 79 MMHG | HEART RATE: 80 BPM | WEIGHT: 254 LBS

## 2019-05-03 DIAGNOSIS — C20 RECTAL CANCER (HCC): Primary | ICD-10-CM

## 2019-05-03 PROCEDURE — 99024 POSTOP FOLLOW-UP VISIT: CPT | Performed by: PHYSICIAN ASSISTANT

## 2019-05-03 NOTE — PROGRESS NOTES
Post Operative Visit Note       Active Problems  1.  Rectal cancer (HCC) at 15 cm between the second and third rectal folds         Chief Complaint   Patient presents with:  Post-Op: PO, 4/24 ileostomy takedown DJP - denies any symptoms -sees DJP for suture Father    • Lipids Mother    • Obesity Mother      Social History    Socioeconomic History      Marital status:       Spouse name: Not on file      Number of children: Not on file      Years of education: Not on file      Highest education level: No Psychiatric/Behavioral: Negative for behavioral problems and sleep disturbance.        Physical Findings   /79   Pulse 80   Temp 98.1 °F (36.7 °C)   Wt 254 lb   BMI 30.12 kg/m²   Physical Exam   Constitutional: He is oriented to person, place, and t appointment scheduled with Dr. Melody Echeverria at next Tuesday, May 7. They will discuss timing of resumption of chemotherapy at that time. Currently, he is scheduled for his next chemotherapy treatment on May 9. The patient may shower.   Once his staples have

## 2019-05-03 NOTE — PROGRESS NOTES
Post Operative Visit Note       Active Problems  No diagnosis found.      Chief Complaint   Patient presents with:  Post-Op: PO, 4/24 ileostomy takedown DJP - denies any symptoms -sees DJP for surture removal - wants to be able to return to work, brought in Medications:   •  Rivaroxaban 20 MG Oral Tab, Take 1 tablet (20 mg total) by mouth daily with food. , Disp: 90 tablet, Rfl: 3  •  ondansetron 8 MG Oral Tablet Dispersible, Take 1 tablet (8 mg total) by mouth every 8 (eight) hours as needed for Nausea., Disp

## 2019-05-07 ENCOUNTER — OFFICE VISIT (OUTPATIENT)
Dept: SURGERY | Facility: CLINIC | Age: 54
End: 2019-05-07

## 2019-05-07 VITALS
BODY MASS INDEX: 30 KG/M2 | DIASTOLIC BLOOD PRESSURE: 80 MMHG | SYSTOLIC BLOOD PRESSURE: 118 MMHG | HEART RATE: 76 BPM | TEMPERATURE: 98 F | WEIGHT: 254 LBS

## 2019-05-07 DIAGNOSIS — C78.02 SECONDARY ADENOCARCINOMA OF LEFT LUNG (HCC): ICD-10-CM

## 2019-05-07 DIAGNOSIS — C78.7 SECONDARY LIVER CANCER (HCC): ICD-10-CM

## 2019-05-07 DIAGNOSIS — C18.6 CANCER OF DESCENDING COLON (HCC): ICD-10-CM

## 2019-05-07 DIAGNOSIS — C20 RECTAL CANCER (HCC): Primary | ICD-10-CM

## 2019-05-07 DIAGNOSIS — C78.01 SECONDARY CARCINOMA OF RIGHT LUNG (HCC): ICD-10-CM

## 2019-05-07 PROBLEM — Z93.2 ILEOSTOMY IN PLACE (HCC): Status: RESOLVED | Noted: 2019-01-18 | Resolved: 2019-05-07

## 2019-05-07 PROCEDURE — 99024 POSTOP FOLLOW-UP VISIT: CPT | Performed by: COLON & RECTAL SURGERY

## 2019-05-07 NOTE — PROGRESS NOTES
Post Operative Visit Note       Active Problems  1. Rectal cancer (HCC) at 15 cm between the second and third rectal folds    2. Cancer of descending colon ([facility] [address] approximately 40 cm, within a large adenomatous polyp    3.  Secondary liver cancer ([facility] [address]    4 [facility])     left leg dvt   • High cholesterol     borderline   • Personal history of antineoplastic chemotherapy     last chemo 4/4/2019   • Visual impairment     glasses/contacts     Past Surgical History:   Procedure Laterality Date   • COLECTOMY  10/12/20 Systems   Constitutional: Negative for chills, diaphoresis, fatigue, fever and unexpected weight change. HENT: Negative for hearing loss, nosebleeds, sore throat and trouble swallowing.     Respiratory: Negative for apnea, cough, shortness of breath and w exhibits no edema. Neurological: He is alert and oriented to person, place, and time. Skin: Skin is warm and dry. No rash noted. He is not diaphoretic. No erythema. Psychiatric: He has a normal mood and affect.  His behavior is normal. Judgment and th for ongoing    Care and treatment of his rectal cancer. No orders of the defined types were placed in this encounter. Imaging & Referrals   None    Follow Up  Return in 3 months (on 8/7/2019) for follow up rectal cancer.     Dory Garces MD

## 2019-05-07 NOTE — PATIENT INSTRUCTIONS
The patient is a pleasant 53-year-old male who presents for his second postoperative visit. He underwent ileostomy takedown on April 24, 2019. At his last office visit, his surgical drain was removed.   He is now 2 weeks out and ready for staple removal.

## 2019-05-09 ENCOUNTER — OFFICE VISIT (OUTPATIENT)
Dept: HEMATOLOGY/ONCOLOGY | Age: 54
End: 2019-05-09
Attending: INTERNAL MEDICINE
Payer: COMMERCIAL

## 2019-05-09 ENCOUNTER — NURSE ONLY (OUTPATIENT)
Dept: HEMATOLOGY/ONCOLOGY | Age: 54
End: 2019-05-09
Attending: INTERNAL MEDICINE
Payer: COMMERCIAL

## 2019-05-09 VITALS
HEART RATE: 73 BPM | RESPIRATION RATE: 16 BRPM | OXYGEN SATURATION: 99 % | BODY MASS INDEX: 30 KG/M2 | WEIGHT: 254.5 LBS | DIASTOLIC BLOOD PRESSURE: 88 MMHG | SYSTOLIC BLOOD PRESSURE: 133 MMHG

## 2019-05-09 DIAGNOSIS — C78.7 SECONDARY LIVER CANCER (HCC): ICD-10-CM

## 2019-05-09 DIAGNOSIS — C78.01 SECONDARY CARCINOMA OF RIGHT LUNG (HCC): ICD-10-CM

## 2019-05-09 DIAGNOSIS — C78.02 SECONDARY ADENOCARCINOMA OF LEFT LUNG (HCC): ICD-10-CM

## 2019-05-09 DIAGNOSIS — C20 RECTAL CANCER (HCC): Primary | ICD-10-CM

## 2019-05-09 PROCEDURE — 96409 CHEMO IV PUSH SNGL DRUG: CPT

## 2019-05-09 PROCEDURE — 99214 OFFICE O/P EST MOD 30 MIN: CPT | Performed by: INTERNAL MEDICINE

## 2019-05-09 PROCEDURE — 85025 COMPLETE CBC W/AUTO DIFF WBC: CPT

## 2019-05-09 PROCEDURE — 96416 CHEMO PROLONG INFUSE W/PUMP: CPT

## 2019-05-09 PROCEDURE — 96375 TX/PRO/DX INJ NEW DRUG ADDON: CPT

## 2019-05-09 PROCEDURE — 82378 CARCINOEMBRYONIC ANTIGEN: CPT

## 2019-05-09 PROCEDURE — 96367 TX/PROPH/DG ADDL SEQ IV INF: CPT

## 2019-05-09 PROCEDURE — 80053 COMPREHEN METABOLIC PANEL: CPT

## 2019-05-09 RX ORDER — FLUOROURACIL 50 MG/ML
400 INJECTION, SOLUTION INTRAVENOUS ONCE
Status: COMPLETED | OUTPATIENT
Start: 2019-05-09 | End: 2019-05-09

## 2019-05-09 RX ORDER — LORAZEPAM 0.5 MG/1
TABLET ORAL EVERY 4 HOURS PRN
Status: CANCELLED | OUTPATIENT
Start: 2019-05-09

## 2019-05-09 RX ORDER — FLUOROURACIL 50 MG/ML
400 INJECTION, SOLUTION INTRAVENOUS ONCE
Status: CANCELLED | OUTPATIENT
Start: 2019-05-09

## 2019-05-09 RX ORDER — FLUOROURACIL 50 MG/ML
2400 INJECTION, SOLUTION INTRAVENOUS CONTINUOUS
Status: DISCONTINUED | OUTPATIENT
Start: 2019-05-09 | End: 2019-05-09

## 2019-05-09 RX ORDER — FLUOROURACIL 50 MG/ML
2400 INJECTION, SOLUTION INTRAVENOUS CONTINUOUS
Status: CANCELLED | OUTPATIENT
Start: 2019-05-09

## 2019-05-09 RX ORDER — LORAZEPAM 2 MG/ML
INJECTION INTRAMUSCULAR EVERY 4 HOURS PRN
Status: CANCELLED | OUTPATIENT
Start: 2019-05-09

## 2019-05-09 RX ORDER — ONDANSETRON 2 MG/ML
8 INJECTION INTRAMUSCULAR; INTRAVENOUS EVERY 6 HOURS PRN
Status: CANCELLED | OUTPATIENT
Start: 2019-05-09

## 2019-05-09 RX ORDER — PROCHLORPERAZINE MALEATE 10 MG
10 TABLET ORAL EVERY 6 HOURS PRN
Status: CANCELLED | OUTPATIENT
Start: 2019-05-09

## 2019-05-09 RX ORDER — METOCLOPRAMIDE HYDROCHLORIDE 5 MG/ML
10 INJECTION INTRAMUSCULAR; INTRAVENOUS EVERY 6 HOURS PRN
Status: CANCELLED | OUTPATIENT
Start: 2019-05-09

## 2019-05-09 RX ADMIN — FLUOROURACIL 950 MG: 50 INJECTION, SOLUTION INTRAVENOUS at 11:51:00

## 2019-05-09 RX ADMIN — FLUOROURACIL 5800 MG: 50 INJECTION, SOLUTION INTRAVENOUS at 12:00:00

## 2019-05-09 NOTE — PATIENT INSTRUCTIONS
For Dr. Gomez Sanchez nurse line, call [phone] with any questions or concerns,  Monday through Friday 8:00 to 4:30.      After hours or weekends for urgent needs: [phone].   Central [phone]  Medical Records:   [phone]  Cancer [facility]

## 2019-05-09 NOTE — PROGRESS NOTES
Patient is here for MD f/u and cycle 12. Patient c/o numbness/tingling in fingertips and left toes. Eating well. Energy level is good. Patient had ileostomy take down on 4/24. BM have been normal with mild urgency.  Last chemo was on April 4th before surger

## 2019-05-11 ENCOUNTER — NURSE ONLY (OUTPATIENT)
Dept: HEMATOLOGY/ONCOLOGY | Facility: HOSPITAL | Age: 54
End: 2019-05-11
Attending: INTERNAL MEDICINE
Payer: COMMERCIAL

## 2019-05-11 PROCEDURE — 96523 IRRIG DRUG DELIVERY DEVICE: CPT

## 2019-05-13 NOTE — PROGRESS NOTES
[address]    PATIENT'S NAME: Sid Sinclair   ATTENDING PHYSICIAN: Nicole Sanchez M.D.    PATIENT ACCOUNT #: [de-identified] LOCATION: [address] [place] [address] RECORD #: [id number] YOB: 1965   DATE OF SERVICE: 05/09/2019       CANCER ROSA current medications include multivitamin, ondansetron p.r.n., prochlorperazine p.r.n., and rivaroxaban 20 mg daily. He has an IVC filter in place. He had a left leg DVT in the midst of his chemotherapy.   He still has minimal swelling in the left leg only We will have him back here again in 3 weeks for his next cycle and then be prepared to give him Avastin at that time. I will coordinate the imaging and the IVC filter removal after that.     Dictated By Moisés Alfred M.D.  d: 05/11/2019 06:38:07  t: 0

## 2019-05-30 ENCOUNTER — OFFICE VISIT (OUTPATIENT)
Dept: HEMATOLOGY/ONCOLOGY | Age: 54
End: 2019-05-30
Attending: INTERNAL MEDICINE
Payer: COMMERCIAL

## 2019-05-30 VITALS
DIASTOLIC BLOOD PRESSURE: 81 MMHG | SYSTOLIC BLOOD PRESSURE: 125 MMHG | BODY MASS INDEX: 31 KG/M2 | RESPIRATION RATE: 16 BRPM | HEART RATE: 70 BPM | TEMPERATURE: 98 F | WEIGHT: 259 LBS | OXYGEN SATURATION: 98 %

## 2019-05-30 DIAGNOSIS — C20 RECTAL CANCER (HCC): Primary | ICD-10-CM

## 2019-05-30 DIAGNOSIS — C78.01 SECONDARY CARCINOMA OF RIGHT LUNG (HCC): ICD-10-CM

## 2019-05-30 DIAGNOSIS — C78.02 SECONDARY ADENOCARCINOMA OF LEFT LUNG (HCC): ICD-10-CM

## 2019-05-30 DIAGNOSIS — C78.7 SECONDARY LIVER CANCER (HCC): Primary | ICD-10-CM

## 2019-05-30 DIAGNOSIS — C20 RECTAL CANCER (HCC): ICD-10-CM

## 2019-05-30 DIAGNOSIS — C78.7 SECONDARY LIVER CANCER (HCC): ICD-10-CM

## 2019-05-30 PROCEDURE — 96411 CHEMO IV PUSH ADDL DRUG: CPT

## 2019-05-30 PROCEDURE — 96413 CHEMO IV INFUSION 1 HR: CPT

## 2019-05-30 PROCEDURE — 85025 COMPLETE CBC W/AUTO DIFF WBC: CPT

## 2019-05-30 PROCEDURE — 96367 TX/PROPH/DG ADDL SEQ IV INF: CPT

## 2019-05-30 PROCEDURE — 99214 OFFICE O/P EST MOD 30 MIN: CPT | Performed by: INTERNAL MEDICINE

## 2019-05-30 PROCEDURE — 82378 CARCINOEMBRYONIC ANTIGEN: CPT

## 2019-05-30 PROCEDURE — 80053 COMPREHEN METABOLIC PANEL: CPT

## 2019-05-30 PROCEDURE — 96375 TX/PRO/DX INJ NEW DRUG ADDON: CPT

## 2019-05-30 RX ORDER — LORAZEPAM 2 MG/ML
INJECTION INTRAMUSCULAR EVERY 4 HOURS PRN
Status: CANCELLED | OUTPATIENT
Start: 2019-05-30

## 2019-05-30 RX ORDER — FLUOROURACIL 50 MG/ML
2400 INJECTION, SOLUTION INTRAVENOUS CONTINUOUS
Status: DISCONTINUED | OUTPATIENT
Start: 2019-05-30 | End: 2019-05-30

## 2019-05-30 RX ORDER — LORAZEPAM 0.5 MG/1
TABLET ORAL EVERY 4 HOURS PRN
Status: CANCELLED | OUTPATIENT
Start: 2019-05-30

## 2019-05-30 RX ORDER — FLUOROURACIL 50 MG/ML
2400 INJECTION, SOLUTION INTRAVENOUS CONTINUOUS
Status: CANCELLED | OUTPATIENT
Start: 2019-05-30

## 2019-05-30 RX ORDER — FLUOROURACIL 50 MG/ML
400 INJECTION, SOLUTION INTRAVENOUS ONCE
Status: CANCELLED | OUTPATIENT
Start: 2019-05-30

## 2019-05-30 RX ORDER — FLUOROURACIL 50 MG/ML
400 INJECTION, SOLUTION INTRAVENOUS ONCE
Status: COMPLETED | OUTPATIENT
Start: 2019-05-30 | End: 2019-05-30

## 2019-05-30 RX ORDER — PROCHLORPERAZINE MALEATE 10 MG
10 TABLET ORAL EVERY 6 HOURS PRN
Status: CANCELLED | OUTPATIENT
Start: 2019-05-30

## 2019-05-30 RX ORDER — ONDANSETRON 2 MG/ML
8 INJECTION INTRAMUSCULAR; INTRAVENOUS EVERY 6 HOURS PRN
Status: CANCELLED | OUTPATIENT
Start: 2019-05-30

## 2019-05-30 RX ORDER — METOCLOPRAMIDE HYDROCHLORIDE 5 MG/ML
10 INJECTION INTRAMUSCULAR; INTRAVENOUS EVERY 6 HOURS PRN
Status: CANCELLED | OUTPATIENT
Start: 2019-05-30

## 2019-05-30 RX ADMIN — FLUOROURACIL 950 MG: 50 INJECTION, SOLUTION INTRAVENOUS at 12:16:00

## 2019-05-30 RX ADMIN — FLUOROURACIL 5800 MG: 50 INJECTION, SOLUTION INTRAVENOUS at 12:28:00

## 2019-05-30 NOTE — PROGRESS NOTES
Pt here for 3 week MD f/u and due for chemo. Energy level has been a little less than normal. Appetite has been good. Denies pain. No bowel issues to note. Pt has continued neuropathy in hands and feet. Pt has no further complaints.      Outpatient Oncology

## 2019-05-30 NOTE — PROGRESS NOTES
Pt here for C13D1.   Arrives Ambulating independently, accompanied by Self           Modifications in dose or schedule: No     Frequency of blood return and site check throughout administration: Prior to administration   Discharged to Home, Ambulating indep

## 2019-06-01 ENCOUNTER — NURSE ONLY (OUTPATIENT)
Dept: HEMATOLOGY/ONCOLOGY | Facility: HOSPITAL | Age: 54
End: 2019-06-01
Attending: INTERNAL MEDICINE
Payer: COMMERCIAL

## 2019-06-01 PROCEDURE — 96523 IRRIG DRUG DELIVERY DEVICE: CPT

## 2019-06-01 NOTE — PROGRESS NOTES
[facility]    PATIENT'S NAME: Capo Tan   ATTENDING PHYSICIAN: Oniel Wakefield M.D.    PATIENT ACCOUNT #: [de-identified] LOCATION: [address] [place] [address] RECORD #: [id number] YOB: 1965   DATE OF SERVICE: 05/30/2019       CANCER ROSA cough, or shortness of breath. He has persistent mild neuropathy in his hands and feet that is grade 1 and does not affect his activities of daily living. His energy level is a little lower.   Current medications include multivitamin daily, ondansetron 8 next cycle and then likely plan for removal thereafter.   His Xarelto will have to be held for about 48 hours prior to that removal.    Dictated By Obdulio Hay M.D.  d: 05/31/2019 06:23:47  t: 05/31/2019 09:46:50  Job [id number]  AH/    cc:

## 2019-06-21 ENCOUNTER — OFFICE VISIT (OUTPATIENT)
Dept: HEMATOLOGY/ONCOLOGY | Age: 54
End: 2019-06-21
Attending: INTERNAL MEDICINE
Payer: COMMERCIAL

## 2019-06-21 VITALS
BODY MASS INDEX: 31 KG/M2 | DIASTOLIC BLOOD PRESSURE: 86 MMHG | WEIGHT: 259.5 LBS | SYSTOLIC BLOOD PRESSURE: 122 MMHG | RESPIRATION RATE: 16 BRPM | TEMPERATURE: 98 F | HEART RATE: 65 BPM | OXYGEN SATURATION: 97 %

## 2019-06-21 DIAGNOSIS — C20 RECTAL CANCER (HCC): Primary | ICD-10-CM

## 2019-06-21 DIAGNOSIS — C78.7 SECONDARY ADENOCARCINOMA OF LIVER (HCC): ICD-10-CM

## 2019-06-21 DIAGNOSIS — C78.7 SECONDARY LIVER CANCER (HCC): ICD-10-CM

## 2019-06-21 DIAGNOSIS — C78.01 SECONDARY CARCINOMA OF RIGHT LUNG (HCC): ICD-10-CM

## 2019-06-21 DIAGNOSIS — C78.7 SECONDARY LIVER CANCER (HCC): Primary | ICD-10-CM

## 2019-06-21 DIAGNOSIS — C78.00 SECONDARY ADENOCARCINOMA OF LUNG, UNSPECIFIED LATERALITY (HCC): ICD-10-CM

## 2019-06-21 DIAGNOSIS — C20 RECTAL CANCER (HCC): ICD-10-CM

## 2019-06-21 DIAGNOSIS — I82.432 ACUTE DEEP VEIN THROMBOSIS (DVT) OF POPLITEAL VEIN OF LEFT LOWER EXTREMITY (HCC): ICD-10-CM

## 2019-06-21 DIAGNOSIS — C78.02 SECONDARY ADENOCARCINOMA OF LEFT LUNG (HCC): ICD-10-CM

## 2019-06-21 PROCEDURE — 96367 TX/PROPH/DG ADDL SEQ IV INF: CPT

## 2019-06-21 PROCEDURE — 99215 OFFICE O/P EST HI 40 MIN: CPT | Performed by: INTERNAL MEDICINE

## 2019-06-21 PROCEDURE — 82378 CARCINOEMBRYONIC ANTIGEN: CPT

## 2019-06-21 PROCEDURE — 96411 CHEMO IV PUSH ADDL DRUG: CPT

## 2019-06-21 PROCEDURE — 80053 COMPREHEN METABOLIC PANEL: CPT

## 2019-06-21 PROCEDURE — 96413 CHEMO IV INFUSION 1 HR: CPT

## 2019-06-21 PROCEDURE — 85025 COMPLETE CBC W/AUTO DIFF WBC: CPT

## 2019-06-21 RX ORDER — LORAZEPAM 0.5 MG/1
TABLET ORAL EVERY 4 HOURS PRN
Status: CANCELLED | OUTPATIENT
Start: 2019-06-21

## 2019-06-21 RX ORDER — LORAZEPAM 2 MG/ML
INJECTION INTRAMUSCULAR EVERY 4 HOURS PRN
Status: CANCELLED | OUTPATIENT
Start: 2019-06-21

## 2019-06-21 RX ORDER — METOCLOPRAMIDE HYDROCHLORIDE 5 MG/ML
10 INJECTION INTRAMUSCULAR; INTRAVENOUS EVERY 6 HOURS PRN
Status: CANCELLED | OUTPATIENT
Start: 2019-06-21

## 2019-06-21 RX ORDER — FLUOROURACIL 50 MG/ML
2400 INJECTION, SOLUTION INTRAVENOUS CONTINUOUS
Status: CANCELLED | OUTPATIENT
Start: 2019-06-21

## 2019-06-21 RX ORDER — FLUOROURACIL 50 MG/ML
400 INJECTION, SOLUTION INTRAVENOUS ONCE
Status: CANCELLED | OUTPATIENT
Start: 2019-06-21

## 2019-06-21 RX ORDER — PROCHLORPERAZINE MALEATE 10 MG
10 TABLET ORAL EVERY 6 HOURS PRN
Status: CANCELLED | OUTPATIENT
Start: 2019-06-21

## 2019-06-21 RX ORDER — ONDANSETRON 2 MG/ML
8 INJECTION INTRAMUSCULAR; INTRAVENOUS EVERY 6 HOURS PRN
Status: CANCELLED | OUTPATIENT
Start: 2019-06-21

## 2019-06-21 RX ORDER — FLUOROURACIL 50 MG/ML
400 INJECTION, SOLUTION INTRAVENOUS ONCE
Status: COMPLETED | OUTPATIENT
Start: 2019-06-21 | End: 2019-06-21

## 2019-06-21 RX ORDER — FLUOROURACIL 50 MG/ML
2400 INJECTION, SOLUTION INTRAVENOUS CONTINUOUS
Status: DISCONTINUED | OUTPATIENT
Start: 2019-06-21 | End: 2019-06-21

## 2019-06-21 RX ADMIN — FLUOROURACIL 950 MG: 50 INJECTION, SOLUTION INTRAVENOUS at 11:35:00

## 2019-06-21 RX ADMIN — FLUOROURACIL 5800 MG: 50 INJECTION, SOLUTION INTRAVENOUS at 11:40:00

## 2019-06-21 NOTE — PROGRESS NOTES
Hematology/Oncology Clinic Follow Up Visit    Patient Name: Matt Allen  Medical Record Number: [id number]    YOB: 1965    Primary Oncologist: Dr. Angeline Adam     Reason for Consultation:  Matt Allen was seen today for the diagnosis of r [facility] MAIN OR   • OTHER SURGICAL HISTORY  04/24/2019    ileostomy takedown   • TONSILLECTOMY     • WISDOM TEETH REMOVED     • XI ROBOT-ASSISTED LAPAROSCOPIC LOW ANTERIOR COLON RESECTION N/A 10/12/2018    Performed by Samira Kwon MD at [address] [place] [address] and affect are appropriate  Eyes: EOMI  ENT: Oropharynx is clear  CV: Regular rate and rhythm, no murmurs  Respiratory: Lungs clear to auscultation bilaterally  GI/Abd: Soft, non-tender with normoactive bowel sounds, no hepatosplenomegaly.  + well healed ab Impression & Plan:     *metastatic rectal cancer  -metastases to liver and lungs  -followed by Dr. Diony Hobbs.  Has had stable disease on maintenance chemotherapy with 5FU/LV + avastin which he continues to tolerate well.   -proceed with next cycle of 5FU

## 2019-06-21 NOTE — PATIENT INSTRUCTIONS
To reach Dr Nato Dejesus nurse during business hours, please call [phone]. After hours, including weekends, evenings, and holidays, please call the main number [phone] for emergent needs.

## 2019-06-21 NOTE — PROGRESS NOTES
Pt here for C14D1.   Arrives Ambulating independently, accompanied by Self          Modifications in dose or schedule: No     Frequency of blood return and site check throughout administration: Prior to administration   Discharged to Home, Ambulating indepe

## 2019-06-23 ENCOUNTER — NURSE ONLY (OUTPATIENT)
Dept: HEMATOLOGY/ONCOLOGY | Facility: HOSPITAL | Age: 54
End: 2019-06-23
Attending: INTERNAL MEDICINE
Payer: COMMERCIAL

## 2019-06-23 PROCEDURE — 96523 IRRIG DRUG DELIVERY DEVICE: CPT

## 2019-07-06 ENCOUNTER — HOSPITAL ENCOUNTER (OUTPATIENT)
Dept: CT IMAGING | Age: 54
Discharge: HOME | End: 2019-07-06
Attending: INTERNAL MEDICINE
Payer: COMMERCIAL

## 2019-07-06 DIAGNOSIS — C78.00 SECONDARY ADENOCARCINOMA OF LUNG, UNSPECIFIED LATERALITY (HCC): ICD-10-CM

## 2019-07-06 DIAGNOSIS — C20 RECTAL CANCER (HCC): ICD-10-CM

## 2019-07-06 DIAGNOSIS — C78.7 SECONDARY ADENOCARCINOMA OF LIVER (HCC): ICD-10-CM

## 2019-07-06 PROCEDURE — 71260 CT THORAX DX C+: CPT | Performed by: INTERNAL MEDICINE

## 2019-07-06 PROCEDURE — 74177 CT ABD & PELVIS W/CONTRAST: CPT | Performed by: INTERNAL MEDICINE

## 2019-07-08 ENCOUNTER — TELEPHONE (OUTPATIENT)
Dept: HEMATOLOGY/ONCOLOGY | Facility: HOSPITAL | Age: 54
End: 2019-07-08

## 2019-07-08 NOTE — TELEPHONE ENCOUNTER
Spoke to patient about CT results. Plan to see him this Thursday and go over details. Will need to change treatment. He is Her2 + and if possible will try to get trastuzumab/pertuzumab if we can get it covered by insurance.   Stu Grubbs MD

## 2019-07-11 ENCOUNTER — OFFICE VISIT (OUTPATIENT)
Dept: HEMATOLOGY/ONCOLOGY | Age: 54
End: 2019-07-11
Attending: INTERNAL MEDICINE
Payer: COMMERCIAL

## 2019-07-11 VITALS
HEIGHT: 77.01 IN | OXYGEN SATURATION: 98 % | TEMPERATURE: 98 F | BODY MASS INDEX: 30.58 KG/M2 | SYSTOLIC BLOOD PRESSURE: 121 MMHG | WEIGHT: 259 LBS | RESPIRATION RATE: 16 BRPM | DIASTOLIC BLOOD PRESSURE: 80 MMHG | HEART RATE: 87 BPM

## 2019-07-11 VITALS — WEIGHT: 259 LBS | BODY MASS INDEX: 30.58 KG/M2 | HEIGHT: 77.01 IN

## 2019-07-11 DIAGNOSIS — C78.02 SECONDARY ADENOCARCINOMA OF LEFT LUNG (HCC): ICD-10-CM

## 2019-07-11 DIAGNOSIS — C18.6 CANCER OF DESCENDING COLON (HCC): ICD-10-CM

## 2019-07-11 DIAGNOSIS — C78.01 SECONDARY CARCINOMA OF RIGHT LUNG (HCC): ICD-10-CM

## 2019-07-11 DIAGNOSIS — C20 RECTAL CANCER (HCC): Primary | ICD-10-CM

## 2019-07-11 DIAGNOSIS — C78.7 SECONDARY LIVER CANCER (HCC): Primary | ICD-10-CM

## 2019-07-11 DIAGNOSIS — C78.7 SECONDARY LIVER CANCER (HCC): ICD-10-CM

## 2019-07-11 LAB
ALBUMIN SERPL-MCNC: 3.5 G/DL (ref 3.4–5)
ALBUMIN/GLOB SERPL: 0.9 {RATIO} (ref 1–2)
ALP LIVER SERPL-CCNC: 61 U/L (ref 45–117)
ALT SERPL-CCNC: 33 U/L (ref 16–61)
ANION GAP SERPL CALC-SCNC: 8 MMOL/L (ref 0–18)
AST SERPL-CCNC: 29 U/L (ref 15–37)
BASOPHILS # BLD AUTO: 0.03 X10(3) UL (ref 0–0.2)
BASOPHILS NFR BLD AUTO: 0.7 %
BILIRUB SERPL-MCNC: 0.5 MG/DL (ref 0.1–2)
BUN BLD-MCNC: 12 MG/DL (ref 7–18)
BUN/CREAT SERPL: 10.3 (ref 10–20)
CALCIUM BLD-MCNC: 8.8 MG/DL (ref 8.5–10.1)
CEA SERPL-MCNC: 17 NG/ML (ref ?–5)
CHLORIDE SERPL-SCNC: 107 MMOL/L (ref 98–112)
CO2 SERPL-SCNC: 27 MMOL/L (ref 21–32)
CREAT BLD-MCNC: 1.17 MG/DL (ref 0.7–1.3)
DEPRECATED RDW RBC AUTO: 48.9 FL (ref 35.1–46.3)
EOSINOPHIL # BLD AUTO: 0.2 X10(3) UL (ref 0–0.7)
EOSINOPHIL NFR BLD AUTO: 4.5 %
ERYTHROCYTE [DISTWIDTH] IN BLOOD BY AUTOMATED COUNT: 13.9 % (ref 11–15)
GLOBULIN PLAS-MCNC: 3.7 G/DL (ref 2.8–4.4)
GLUCOSE BLD-MCNC: 112 MG/DL (ref 70–99)
HCT VFR BLD AUTO: 43.9 % (ref 39–53)
HGB BLD-MCNC: 14.1 G/DL (ref 13–17.5)
IMM GRANULOCYTES # BLD AUTO: 0.02 X10(3) UL (ref 0–1)
IMM GRANULOCYTES NFR BLD: 0.5 %
LYMPHOCYTES # BLD AUTO: 1.18 X10(3) UL (ref 1–4)
LYMPHOCYTES NFR BLD AUTO: 26.8 %
M PROTEIN MFR SERPL ELPH: 7.2 G/DL (ref 6.4–8.2)
MCH RBC QN AUTO: 31.1 PG (ref 26–34)
MCHC RBC AUTO-ENTMCNC: 32.1 G/DL (ref 31–37)
MCV RBC AUTO: 96.7 FL (ref 80–100)
MONOCYTES # BLD AUTO: 0.63 X10(3) UL (ref 0.1–1)
MONOCYTES NFR BLD AUTO: 14.3 %
NEUTROPHILS # BLD AUTO: 2.34 X10 (3) UL (ref 1.5–7.7)
NEUTROPHILS # BLD AUTO: 2.34 X10(3) UL (ref 1.5–7.7)
NEUTROPHILS NFR BLD AUTO: 53.2 %
OSMOLALITY SERPL CALC.SUM OF ELEC: 295 MOSM/KG (ref 275–295)
PLATELET # BLD AUTO: 189 10(3)UL (ref 150–450)
POTASSIUM SERPL-SCNC: 3.9 MMOL/L (ref 3.5–5.1)
RBC # BLD AUTO: 4.54 X10(6)UL (ref 4.3–5.7)
SODIUM SERPL-SCNC: 142 MMOL/L (ref 136–145)
WBC # BLD AUTO: 4.4 X10(3) UL (ref 4–11)

## 2019-07-11 PROCEDURE — 82378 CARCINOEMBRYONIC ANTIGEN: CPT

## 2019-07-11 PROCEDURE — 99214 OFFICE O/P EST MOD 30 MIN: CPT | Performed by: INTERNAL MEDICINE

## 2019-07-11 PROCEDURE — 85025 COMPLETE CBC W/AUTO DIFF WBC: CPT

## 2019-07-11 PROCEDURE — 36591 DRAW BLOOD OFF VENOUS DEVICE: CPT

## 2019-07-11 PROCEDURE — 80053 COMPREHEN METABOLIC PANEL: CPT

## 2019-07-11 NOTE — PROGRESS NOTES
Patient is here for MD f/u for rectal cancer. Patient had a Ct scan on 7/6. Here to discuss the results and POC.       Education Record    Learner:  Patient    Disease / Diagnosis:  Rectal Cancer     Barriers / Limitations:  None   Comments:    Method:  Dis

## 2019-07-12 RX ORDER — PROCHLORPERAZINE MALEATE 10 MG
10 TABLET ORAL EVERY 6 HOURS PRN
Qty: 30 TABLET | Refills: 3 | Status: SHIPPED | OUTPATIENT
Start: 2019-07-12 | End: 2019-08-23

## 2019-07-12 RX ORDER — ONDANSETRON HYDROCHLORIDE 8 MG/1
8 TABLET, FILM COATED ORAL EVERY 8 HOURS PRN
Qty: 30 TABLET | Refills: 3 | Status: SHIPPED | OUTPATIENT
Start: 2019-07-12 | End: 2019-08-23

## 2019-07-16 NOTE — PROGRESS NOTES
[facility]    PATIENT'S NAME: Stephon Baldwin   ATTENDING PHYSICIAN: Kash Sidhu M.D.    PATIENT ACCOUNT #: [de-identified] LOCATION: [address] [place] [address] RECORD #: [id number] YOB: 1965   DATE OF SERVICE: 07/11/2019       CANCER ROSA 10 mg q.6 h. p.r.n., and rivaroxaban 20 mg daily. PHYSICAL EXAMINATION:    GENERAL:  He is a well-developed, well-nourished male. He is in no acute distress. VITAL SIGNS:  His performance status is 0. His weight is 259 pounds.   He is 6 feet 5 inche cetuximab and irinotecan, though the side effects tend to be a bit worse, and I would save this for third line in the future.   He still also could receive oxaliplatin in the future, though he has a modest amount of neuropathy and it would likely rapidly wo

## 2019-07-17 ENCOUNTER — TELEPHONE (OUTPATIENT)
Dept: HEMATOLOGY/ONCOLOGY | Facility: HOSPITAL | Age: 54
End: 2019-07-17

## 2019-07-17 NOTE — TELEPHONE ENCOUNTER
Called pt to review process for the [facility] Patient Assistance paperwork we will fill out to apply for free drug for perjeta/herceptin. Pt will come to cancer center tomorrow to sign paperwork and provide financial information.  Answered questions for yovany

## 2019-07-17 NOTE — TELEPHONE ENCOUNTER
Patient notified we are delaying treatment until further notice.        MD Arpita Ingram   Cc: JOHN Loera; Day Alcala; Lux Holloway RN             We have to let Malik Tripathi know we are working on this and delay starting gail

## 2019-07-18 ENCOUNTER — APPOINTMENT (OUTPATIENT)
Dept: HEMATOLOGY/ONCOLOGY | Age: 54
End: 2019-07-18
Attending: INTERNAL MEDICINE
Payer: COMMERCIAL

## 2019-07-18 ENCOUNTER — HOSPITAL ENCOUNTER (OUTPATIENT)
Dept: CV DIAGNOSTICS | Facility: HOSPITAL | Age: 54
Discharge: HOME | End: 2019-07-18
Attending: INTERNAL MEDICINE
Payer: COMMERCIAL

## 2019-07-18 DIAGNOSIS — C20 RECTAL CANCER (HCC): ICD-10-CM

## 2019-07-18 PROCEDURE — 93307 TTE W/O DOPPLER COMPLETE: CPT | Performed by: INTERNAL MEDICINE

## 2019-07-26 ENCOUNTER — TELEPHONE (OUTPATIENT)
Dept: HEMATOLOGY/ONCOLOGY | Facility: HOSPITAL | Age: 54
End: 2019-07-26

## 2019-07-26 NOTE — TELEPHONE ENCOUNTER
Left message with patient that we have been unable to get this regimen Herceptin/Perjeta covered for colon cancer through his insurance company.   Wondering if he can put pressure on the company through his company's HR department - he works in that [employer]

## 2019-07-30 ENCOUNTER — TELEPHONE (OUTPATIENT)
Dept: HEMATOLOGY/ONCOLOGY | Facility: HOSPITAL | Age: 54
End: 2019-07-30

## 2019-07-30 NOTE — TELEPHONE ENCOUNTER
Spoke to patient about issues with tryin gto get herceptin perjeta for him. [facility] has no study. [facility] - [facility] hasn't contacted me back - will resend to DgJAYLYN. He will try to apply pressure from his HR department.   He will write a letter regardi

## 2019-07-30 NOTE — TELEPHONE ENCOUNTER
Called pt to discuss [facility]. Pt was denied based on income. Discussed options with patient - encouraged pt to write an appeal letter for change of circumstance.  He reports he has had high deductibles to pay and ongoing medical lisa

## 2019-07-31 ENCOUNTER — TELEPHONE (OUTPATIENT)
Dept: HEMATOLOGY/ONCOLOGY | Facility: HOSPITAL | Age: 54
End: 2019-07-31

## 2019-07-31 NOTE — TELEPHONE ENCOUNTER
Patient reached out to Allison from [facility] to assist in getting Herceptin/Perjeta approved through insurance. Referred her to [facility] in billing. Call transferred.

## 2019-08-13 ENCOUNTER — OFFICE VISIT (OUTPATIENT)
Dept: SURGERY | Facility: CLINIC | Age: 54
End: 2019-08-13
Payer: COMMERCIAL

## 2019-08-13 VITALS — SYSTOLIC BLOOD PRESSURE: 138 MMHG | DIASTOLIC BLOOD PRESSURE: 89 MMHG | HEART RATE: 97 BPM | TEMPERATURE: 98 F

## 2019-08-13 DIAGNOSIS — L29.0 PRURITUS ANI: ICD-10-CM

## 2019-08-13 DIAGNOSIS — C20 RECTAL CANCER (HCC): Primary | ICD-10-CM

## 2019-08-13 DIAGNOSIS — C78.01 SECONDARY CARCINOMA OF RIGHT LUNG (HCC): ICD-10-CM

## 2019-08-13 DIAGNOSIS — C78.7 SECONDARY LIVER CANCER (HCC): ICD-10-CM

## 2019-08-13 DIAGNOSIS — C18.6 CANCER OF DESCENDING COLON (HCC): ICD-10-CM

## 2019-08-13 PROCEDURE — 99213 OFFICE O/P EST LOW 20 MIN: CPT | Performed by: COLON & RECTAL SURGERY

## 2019-08-13 NOTE — PROGRESS NOTES
Follow Up Visit Note       Active Problems      1. Rectal cancer (HCC) at 15 cm between the second and third rectal folds    2. Pruritus ani    3. Cancer of descending colon ([address] approximately 40 cm, within a large adenomatous polyp    4.  Secondary carci from his takedown procedure. His last dose of chemotherapy was June 21, 2019. He is currently waiting for the next round of his chemotherapy to be approved by his insurance. The patient had a DVT of the left leg December 31, 2018.  He is now taking Anastasia Obesity Father    • Lipids Mother    • Obesity Mother      Social History    Socioeconomic History      Marital status:       Spouse name: Not on file      Number of children: Not on file      Years of education: Not on file      Highest education l Genitourinary: Negative for difficulty urinating, dysuria, frequency and urgency. Musculoskeletal: Negative for arthralgias and myalgias. Skin: Negative for color change and rash. Neurological: Negative for tremors, syncope and weakness.    Hematolo cancer ([address] at 15 cm between the second and third rectal folds  (primary encounter diagnosis)  Pruritus ani  Cancer of descending colon ([address] approximately 40 cm, within a large adenomatous polyp  Secondary carcinoma of right lung ([address]  Secondary liver December 31, 2018. He is now taking Xarelto. This was thought to be secondary from Avastin during his chemotherapy. The patient also notes some hemorrhoids that are not bothersome at this time. He states his biggest complaint of the hemorrhoids is pain.

## 2019-08-13 NOTE — PATIENT INSTRUCTIONS
Per Murphy is a 48year old male here for continued care following his rectal and colon cancer. The patient has no new complaints at today's visit. The patient denies any black, tarry, bloody or narrowing of the stool.  He denies nausea, vomiting is soft, non-tender, non-distended. Bowel sounds are present with normal activity and normal pitch. No guarding or rebound. There is no ascites. Liver is within normal limits and spleen is not palpable.  There are no masses within the abdominal wall or the

## 2019-08-14 ENCOUNTER — NURSE NAVIGATOR ENCOUNTER (OUTPATIENT)
Dept: HEMATOLOGY/ONCOLOGY | Facility: HOSPITAL | Age: 54
End: 2019-08-14

## 2019-08-14 NOTE — PROGRESS NOTES
[facility] Patient [facility] regarding appeal request sent on 8/5/19. Philippe Wing states they will do an internal appeal investigation - but states that since denial is due to income they were not willing to say if it would be possible.  Explained ana m

## 2019-08-21 ENCOUNTER — TELEPHONE (OUTPATIENT)
Dept: HEMATOLOGY/ONCOLOGY | Facility: HOSPITAL | Age: 54
End: 2019-08-21

## 2019-08-21 NOTE — TELEPHONE ENCOUNTER
Called pt to follow up on status with [facility] application. No answer received from [facility] on appeal. Pt states insurance will not make any exceptions to plan to approve herceptin/perjeta.  Explained to patient that feeling from the

## 2019-08-23 ENCOUNTER — OFFICE VISIT (OUTPATIENT)
Dept: HEMATOLOGY/ONCOLOGY | Facility: HOSPITAL | Age: 54
End: 2019-08-23
Attending: INTERNAL MEDICINE
Payer: COMMERCIAL

## 2019-08-23 VITALS
BODY MASS INDEX: 31 KG/M2 | RESPIRATION RATE: 18 BRPM | WEIGHT: 258 LBS | HEART RATE: 66 BPM | DIASTOLIC BLOOD PRESSURE: 84 MMHG | TEMPERATURE: 97 F | SYSTOLIC BLOOD PRESSURE: 133 MMHG | OXYGEN SATURATION: 100 %

## 2019-08-23 DIAGNOSIS — C20 RECTAL CANCER (HCC): Primary | ICD-10-CM

## 2019-08-23 DIAGNOSIS — C78.02 MALIGNANT NEOPLASM METASTATIC TO LEFT LUNG (HCC): ICD-10-CM

## 2019-08-23 DIAGNOSIS — C78.01 SECONDARY CARCINOMA OF RIGHT LUNG (HCC): ICD-10-CM

## 2019-08-23 DIAGNOSIS — C78.7 SECONDARY LIVER CANCER (HCC): ICD-10-CM

## 2019-08-23 LAB
ALBUMIN SERPL-MCNC: 3.8 G/DL (ref 3.4–5)
ALBUMIN/GLOB SERPL: 0.9 {RATIO} (ref 1–2)
ALP LIVER SERPL-CCNC: 71 U/L (ref 45–117)
ALT SERPL-CCNC: 27 U/L (ref 16–61)
ANION GAP SERPL CALC-SCNC: 5 MMOL/L (ref 0–18)
AST SERPL-CCNC: 26 U/L (ref 15–37)
BASOPHILS # BLD AUTO: 0.04 X10(3) UL (ref 0–0.2)
BASOPHILS NFR BLD AUTO: 0.5 %
BILIRUB SERPL-MCNC: 0.6 MG/DL (ref 0.1–2)
BUN BLD-MCNC: 13 MG/DL (ref 7–18)
BUN/CREAT SERPL: 11.2 (ref 10–20)
CALCIUM BLD-MCNC: 8.8 MG/DL (ref 8.5–10.1)
CEA SERPL-MCNC: 91.3 NG/ML (ref ?–5)
CHLORIDE SERPL-SCNC: 107 MMOL/L (ref 98–112)
CO2 SERPL-SCNC: 30 MMOL/L (ref 21–32)
CREAT BLD-MCNC: 1.16 MG/DL (ref 0.7–1.3)
DEPRECATED RDW RBC AUTO: 47 FL (ref 35.1–46.3)
EOSINOPHIL # BLD AUTO: 0.19 X10(3) UL (ref 0–0.7)
EOSINOPHIL NFR BLD AUTO: 2.5 %
ERYTHROCYTE [DISTWIDTH] IN BLOOD BY AUTOMATED COUNT: 13.5 % (ref 11–15)
GLOBULIN PLAS-MCNC: 4.1 G/DL (ref 2.8–4.4)
GLUCOSE BLD-MCNC: 88 MG/DL (ref 70–99)
HCT VFR BLD AUTO: 47.1 % (ref 39–53)
HGB BLD-MCNC: 15.4 G/DL (ref 13–17.5)
IMM GRANULOCYTES # BLD AUTO: 0.02 X10(3) UL (ref 0–1)
IMM GRANULOCYTES NFR BLD: 0.3 %
LYMPHOCYTES # BLD AUTO: 1.76 X10(3) UL (ref 1–4)
LYMPHOCYTES NFR BLD AUTO: 23.2 %
M PROTEIN MFR SERPL ELPH: 7.9 G/DL (ref 6.4–8.2)
MCH RBC QN AUTO: 30.9 PG (ref 26–34)
MCHC RBC AUTO-ENTMCNC: 32.7 G/DL (ref 31–37)
MCV RBC AUTO: 94.4 FL (ref 80–100)
MONOCYTES # BLD AUTO: 0.72 X10(3) UL (ref 0.1–1)
MONOCYTES NFR BLD AUTO: 9.5 %
NEUTROPHILS # BLD AUTO: 4.87 X10 (3) UL (ref 1.5–7.7)
NEUTROPHILS # BLD AUTO: 4.87 X10(3) UL (ref 1.5–7.7)
NEUTROPHILS NFR BLD AUTO: 64 %
OSMOLALITY SERPL CALC.SUM OF ELEC: 294 MOSM/KG (ref 275–295)
PLATELET # BLD AUTO: 186 10(3)UL (ref 150–450)
POTASSIUM SERPL-SCNC: 4.6 MMOL/L (ref 3.5–5.1)
RBC # BLD AUTO: 4.99 X10(6)UL (ref 4.3–5.7)
SODIUM SERPL-SCNC: 142 MMOL/L (ref 136–145)
WBC # BLD AUTO: 7.6 X10(3) UL (ref 4–11)

## 2019-08-23 PROCEDURE — 99214 OFFICE O/P EST MOD 30 MIN: CPT | Performed by: INTERNAL MEDICINE

## 2019-08-23 NOTE — PROGRESS NOTES
Patient is here for MD f/u for rectal cancer. Here to discuss chemo options and the next step. Denies pain. No GI complaints.        Education Record    Learner:  Patient    Disease / Diagnosis:  Rectal cancer    Barriers / Limitations:  None   Comments:

## 2019-08-27 NOTE — PROGRESS NOTES
[address]    PATIENT'S NAME: Dennys Wendy   ATTENDING PHYSICIAN: Swapna Kulkarni M.D.    PATIENT ACCOUNT #: [de-identified] LOCATION: [address] [place] [address] RECORD #: [id number] YOB: 1965   DATE OF SERVICE: 08/23/2019       CANCER ROSA Unremarkable. LYMPHATICS:  He has no adenopathy. LUNGS:  Clear. HEART:  Normal.  ABDOMEN:  No hepatosplenomegaly or tenderness. EXTREMITIES:  He has no clubbing, cyanosis, or edema. NEUROLOGIC:  He is intact.     LABORATORY DATA:  His CBC and chemistry

## 2019-08-29 ENCOUNTER — OFFICE VISIT (OUTPATIENT)
Dept: HEMATOLOGY/ONCOLOGY | Age: 54
End: 2019-08-29
Attending: INTERNAL MEDICINE
Payer: COMMERCIAL

## 2019-08-29 ENCOUNTER — HOSPITAL ENCOUNTER (OUTPATIENT)
Dept: CT IMAGING | Age: 54
Discharge: HOME | End: 2019-08-29
Attending: INTERNAL MEDICINE
Payer: COMMERCIAL

## 2019-08-29 ENCOUNTER — SOCIAL WORK SERVICES (OUTPATIENT)
Dept: HEMATOLOGY/ONCOLOGY | Facility: HOSPITAL | Age: 54
End: 2019-08-29

## 2019-08-29 VITALS
HEART RATE: 56 BPM | HEIGHT: 77.01 IN | BODY MASS INDEX: 30.58 KG/M2 | WEIGHT: 259 LBS | DIASTOLIC BLOOD PRESSURE: 76 MMHG | TEMPERATURE: 98 F | RESPIRATION RATE: 16 BRPM | OXYGEN SATURATION: 99 % | SYSTOLIC BLOOD PRESSURE: 119 MMHG

## 2019-08-29 DIAGNOSIS — C78.01 SECONDARY CARCINOMA OF RIGHT LUNG (HCC): ICD-10-CM

## 2019-08-29 DIAGNOSIS — C78.7 SECONDARY LIVER CANCER (HCC): Primary | ICD-10-CM

## 2019-08-29 DIAGNOSIS — C20 RECTAL CANCER (HCC): Primary | ICD-10-CM

## 2019-08-29 DIAGNOSIS — C78.7 SECONDARY LIVER CANCER (HCC): ICD-10-CM

## 2019-08-29 DIAGNOSIS — Z71.9 ENCOUNTER FOR HEALTH EDUCATION: ICD-10-CM

## 2019-08-29 DIAGNOSIS — C78.02 MALIGNANT NEOPLASM METASTATIC TO LEFT LUNG (HCC): ICD-10-CM

## 2019-08-29 DIAGNOSIS — C78.02 SECONDARY ADENOCARCINOMA OF LEFT LUNG (HCC): ICD-10-CM

## 2019-08-29 DIAGNOSIS — C20 RECTAL CANCER (HCC): ICD-10-CM

## 2019-08-29 LAB
BILIRUB UR QL STRIP.AUTO: NEGATIVE
CLARITY UR REFRACT.AUTO: CLEAR
COLOR UR AUTO: YELLOW
GLUCOSE UR STRIP.AUTO-MCNC: NEGATIVE MG/DL
KETONES UR STRIP.AUTO-MCNC: NEGATIVE MG/DL
LEUKOCYTE ESTERASE UR QL STRIP.AUTO: NEGATIVE
NITRITE UR QL STRIP.AUTO: NEGATIVE
PH UR STRIP.AUTO: 7.5 [PH] (ref 4.5–8)
PROT UR STRIP.AUTO-MCNC: NEGATIVE MG/DL
RBC UR QL AUTO: NEGATIVE
SP GR UR STRIP.AUTO: 1.01 (ref 1–1.03)
UROBILINOGEN UR STRIP.AUTO-MCNC: 0.2 MG/DL

## 2019-08-29 PROCEDURE — 96375 TX/PRO/DX INJ NEW DRUG ADDON: CPT

## 2019-08-29 PROCEDURE — 99214 OFFICE O/P EST MOD 30 MIN: CPT | Performed by: CLINICAL NURSE SPECIALIST

## 2019-08-29 PROCEDURE — 71250 CT THORAX DX C-: CPT | Performed by: INTERNAL MEDICINE

## 2019-08-29 PROCEDURE — 96417 CHEMO IV INFUS EACH ADDL SEQ: CPT

## 2019-08-29 PROCEDURE — 96413 CHEMO IV INFUSION 1 HR: CPT

## 2019-08-29 PROCEDURE — 81003 URINALYSIS AUTO W/O SCOPE: CPT

## 2019-08-29 RX ORDER — ATROPINE SULFATE 0.4 MG/ML
0.2 AMPUL (ML) INJECTION ONCE
Status: CANCELLED | OUTPATIENT
Start: 2019-08-29

## 2019-08-29 RX ORDER — ATROPINE SULFATE 0.4 MG/ML
0.2 AMPUL (ML) INJECTION ONCE
Status: COMPLETED | OUTPATIENT
Start: 2019-08-29 | End: 2019-08-29

## 2019-08-29 RX ADMIN — ATROPINE SULFATE 0.2 MG: 0.4 MG/ML AMPUL (ML) INJECTION at 12:06:00

## 2019-08-29 NOTE — PROGRESS NOTES
Chemotherapy Education    Learner:  Patient    Barriers / Limitations:  None    Chemotherapy education goals:  · Learn the drug names  · Administration schedule  · Routes of administration  · Treatment setting    Drug names:  Irinotecan, Avastin    Chemoth or problems:  Achieved    Comments:    Treatment Effects on Emotional Status:    Potential mood changes, depression, nervousness, difficulty sleeping:  Achieved    Importance of support system:  Achieved    Notify MD/RN of any emotional changes:  Achieved

## 2019-08-29 NOTE — PROGRESS NOTES
Pt here for C1D1.   Arrives Ambulating independently, accompanied by Self           Patient denies possible pregnancy since last therapy cycle: NA    Modifications in dose or schedule: No     Frequency of blood return and site check throughout administratio

## 2019-08-29 NOTE — PROGRESS NOTES
Sw met with patient to introduce self and discuss resources. Patient reports he lives with spouse and has a supportive family. Patients reports his son is getting  9/23/19 and he was hoping his hair would not be lost before then.  Patent reports that

## 2019-09-03 ENCOUNTER — TELEPHONE (OUTPATIENT)
Dept: HEMATOLOGY/ONCOLOGY | Facility: HOSPITAL | Age: 54
End: 2019-09-03

## 2019-09-03 NOTE — TELEPHONE ENCOUNTER
Date of Treatment: 8/29/19                               Type of Chemo: Irinotecan/bevacizumab    Comments: Reports fatigue and loss of appetite. Denies any N/V/C/D. Recommendations: rest when possible or needed.  Eat small meals and snacks and drink pl

## 2019-09-10 ENCOUNTER — APPOINTMENT (OUTPATIENT)
Dept: HEMATOLOGY/ONCOLOGY | Age: 54
End: 2019-09-10
Attending: INTERNAL MEDICINE
Payer: COMMERCIAL

## 2019-09-12 ENCOUNTER — OFFICE VISIT (OUTPATIENT)
Dept: HEMATOLOGY/ONCOLOGY | Age: 54
End: 2019-09-12
Attending: INTERNAL MEDICINE
Payer: COMMERCIAL

## 2019-09-12 VITALS
DIASTOLIC BLOOD PRESSURE: 77 MMHG | WEIGHT: 258.5 LBS | TEMPERATURE: 98 F | RESPIRATION RATE: 16 BRPM | SYSTOLIC BLOOD PRESSURE: 117 MMHG | HEART RATE: 67 BPM | HEIGHT: 77.01 IN | BODY MASS INDEX: 30.52 KG/M2 | OXYGEN SATURATION: 100 %

## 2019-09-12 DIAGNOSIS — C20 RECTAL CANCER (HCC): ICD-10-CM

## 2019-09-12 DIAGNOSIS — C78.7 SECONDARY LIVER CANCER (HCC): ICD-10-CM

## 2019-09-12 DIAGNOSIS — C78.02 SECONDARY ADENOCARCINOMA OF LEFT LUNG (HCC): ICD-10-CM

## 2019-09-12 DIAGNOSIS — C78.01 SECONDARY CARCINOMA OF RIGHT LUNG (HCC): ICD-10-CM

## 2019-09-12 DIAGNOSIS — C20 RECTAL CANCER (HCC): Primary | ICD-10-CM

## 2019-09-12 DIAGNOSIS — C78.7 SECONDARY LIVER CANCER (HCC): Primary | ICD-10-CM

## 2019-09-12 LAB
ALBUMIN SERPL-MCNC: 3.4 G/DL (ref 3.4–5)
ALBUMIN/GLOB SERPL: 0.9 {RATIO} (ref 1–2)
ALP LIVER SERPL-CCNC: 69 U/L (ref 45–117)
ALT SERPL-CCNC: 27 U/L (ref 16–61)
ANION GAP SERPL CALC-SCNC: 6 MMOL/L (ref 0–18)
AST SERPL-CCNC: 23 U/L (ref 15–37)
BASOPHILS # BLD AUTO: 0.03 X10(3) UL (ref 0–0.2)
BASOPHILS NFR BLD AUTO: 0.5 %
BILIRUB SERPL-MCNC: 0.4 MG/DL (ref 0.1–2)
BUN BLD-MCNC: 13 MG/DL (ref 7–18)
BUN/CREAT SERPL: 11.5 (ref 10–20)
CALCIUM BLD-MCNC: 8.5 MG/DL (ref 8.5–10.1)
CHLORIDE SERPL-SCNC: 106 MMOL/L (ref 98–112)
CO2 SERPL-SCNC: 28 MMOL/L (ref 21–32)
CREAT BLD-MCNC: 1.13 MG/DL (ref 0.7–1.3)
DEPRECATED RDW RBC AUTO: 45.1 FL (ref 35.1–46.3)
EOSINOPHIL # BLD AUTO: 0.29 X10(3) UL (ref 0–0.7)
EOSINOPHIL NFR BLD AUTO: 5 %
ERYTHROCYTE [DISTWIDTH] IN BLOOD BY AUTOMATED COUNT: 13.4 % (ref 11–15)
GLOBULIN PLAS-MCNC: 4 G/DL (ref 2.8–4.4)
GLUCOSE BLD-MCNC: 114 MG/DL (ref 70–99)
HCT VFR BLD AUTO: 43.2 % (ref 39–53)
HGB BLD-MCNC: 14.1 G/DL (ref 13–17.5)
IMM GRANULOCYTES # BLD AUTO: 0.01 X10(3) UL (ref 0–1)
IMM GRANULOCYTES NFR BLD: 0.2 %
LYMPHOCYTES # BLD AUTO: 1.5 X10(3) UL (ref 1–4)
LYMPHOCYTES NFR BLD AUTO: 25.7 %
M PROTEIN MFR SERPL ELPH: 7.4 G/DL (ref 6.4–8.2)
MCH RBC QN AUTO: 30.3 PG (ref 26–34)
MCHC RBC AUTO-ENTMCNC: 32.6 G/DL (ref 31–37)
MCV RBC AUTO: 92.7 FL (ref 80–100)
MONOCYTES # BLD AUTO: 0.49 X10(3) UL (ref 0.1–1)
MONOCYTES NFR BLD AUTO: 8.4 %
NEUTROPHILS # BLD AUTO: 3.52 X10 (3) UL (ref 1.5–7.7)
NEUTROPHILS # BLD AUTO: 3.52 X10(3) UL (ref 1.5–7.7)
NEUTROPHILS NFR BLD AUTO: 60.2 %
OSMOLALITY SERPL CALC.SUM OF ELEC: 291 MOSM/KG (ref 275–295)
PLATELET # BLD AUTO: 184 10(3)UL (ref 150–450)
POTASSIUM SERPL-SCNC: 4.2 MMOL/L (ref 3.5–5.1)
RBC # BLD AUTO: 4.66 X10(6)UL (ref 4.3–5.7)
SODIUM SERPL-SCNC: 140 MMOL/L (ref 136–145)
WBC # BLD AUTO: 5.8 X10(3) UL (ref 4–11)

## 2019-09-12 PROCEDURE — 80053 COMPREHEN METABOLIC PANEL: CPT

## 2019-09-12 PROCEDURE — 96417 CHEMO IV INFUS EACH ADDL SEQ: CPT

## 2019-09-12 PROCEDURE — 96413 CHEMO IV INFUSION 1 HR: CPT

## 2019-09-12 PROCEDURE — 99214 OFFICE O/P EST MOD 30 MIN: CPT | Performed by: INTERNAL MEDICINE

## 2019-09-12 PROCEDURE — 96375 TX/PRO/DX INJ NEW DRUG ADDON: CPT

## 2019-09-12 PROCEDURE — 85025 COMPLETE CBC W/AUTO DIFF WBC: CPT

## 2019-09-12 RX ORDER — ATROPINE SULFATE 0.4 MG/ML
0.2 AMPUL (ML) INJECTION ONCE
Status: COMPLETED | OUTPATIENT
Start: 2019-09-12 | End: 2019-09-12

## 2019-09-12 RX ORDER — ATROPINE SULFATE 0.4 MG/ML
0.2 AMPUL (ML) INJECTION ONCE
Status: CANCELLED | OUTPATIENT
Start: 2019-09-12

## 2019-09-12 RX ADMIN — ATROPINE SULFATE 0.2 MG: 0.4 MG/ML AMPUL (ML) INJECTION at 11:41:00

## 2019-09-12 NOTE — PROGRESS NOTES
Patient is here for MD f/cristóbal and cycle 2 of chemo. No diarrhea. Patient was a little more constipated than usual but otherwise manageable. Appetite is good. He is feeling a little tired lately. Patient c/o discomfort in left groin x few weeks.        Educatio

## 2019-09-12 NOTE — PROGRESS NOTES
Pt here for C2D1 of Irirnotecan/Avastine.   Arrives Ambulating independently, accompanied by Self           Patient denies possible pregnancy since last therapy cycle: No    Modifications in dose or schedule: No     Frequency of blood return and site check

## 2019-09-13 NOTE — PROGRESS NOTES
[address]    PATIENT'S NAME: Jennifer Koenig   ATTENDING PHYSICIAN: Kyleigh Oconnor M.D.    PATIENT ACCOUNT #: [de-identified] LOCATION: [address] [place] [address] RECORD #: [id number] YOB: 1965   DATE OF SERVICE: 09/12/2019       CANCER ROSA MEDICATIONS:  His current medications include multivitamin, ondansetron, prochlorperazine, and rivaroxaban. PHYSICAL EXAMINATION:    GENERAL:  He is a well-developed, well-nourished male. He is in no acute distress.   VITAL SIGNS:  Performance stat

## 2019-09-18 ENCOUNTER — PATIENT MESSAGE (OUTPATIENT)
Dept: HEMATOLOGY/ONCOLOGY | Age: 54
End: 2019-09-18

## 2019-09-18 NOTE — TELEPHONE ENCOUNTER
From: Carlitos Platt  To: Afia Weiss MD  Sent: 9/18/2019 11:27 AM CDT  Subject: Test Results Question    Wondering what my CEA number was for 9/12/2019. Thank you!   Arline Mae

## 2019-09-26 ENCOUNTER — OFFICE VISIT (OUTPATIENT)
Dept: HEMATOLOGY/ONCOLOGY | Age: 54
End: 2019-09-26
Attending: INTERNAL MEDICINE
Payer: COMMERCIAL

## 2019-09-26 VITALS
RESPIRATION RATE: 16 BRPM | HEART RATE: 75 BPM | OXYGEN SATURATION: 97 % | BODY MASS INDEX: 30.82 KG/M2 | HEIGHT: 77.01 IN | SYSTOLIC BLOOD PRESSURE: 127 MMHG | TEMPERATURE: 97 F | WEIGHT: 261 LBS | DIASTOLIC BLOOD PRESSURE: 84 MMHG

## 2019-09-26 DIAGNOSIS — C18.9 MALIGNANT NEOPLASM OF COLON, UNSPECIFIED PART OF COLON (HCC): ICD-10-CM

## 2019-09-26 DIAGNOSIS — C20 RECTAL CANCER (HCC): ICD-10-CM

## 2019-09-26 DIAGNOSIS — C78.02 SECONDARY ADENOCARCINOMA OF LEFT LUNG (HCC): ICD-10-CM

## 2019-09-26 DIAGNOSIS — C78.01 SECONDARY CARCINOMA OF RIGHT LUNG (HCC): ICD-10-CM

## 2019-09-26 DIAGNOSIS — C78.7 SECONDARY LIVER CANCER (HCC): Primary | ICD-10-CM

## 2019-09-26 DIAGNOSIS — C18.9 MALIGNANT NEOPLASM OF COLON, UNSPECIFIED PART OF COLON (HCC): Primary | ICD-10-CM

## 2019-09-26 LAB
ALBUMIN SERPL-MCNC: 3.3 G/DL (ref 3.4–5)
ALBUMIN/GLOB SERPL: 0.9 {RATIO} (ref 1–2)
ALP LIVER SERPL-CCNC: 62 U/L (ref 45–117)
ALT SERPL-CCNC: 27 U/L (ref 16–61)
ANION GAP SERPL CALC-SCNC: 8 MMOL/L (ref 0–18)
AST SERPL-CCNC: 18 U/L (ref 15–37)
BASOPHILS # BLD AUTO: 0.05 X10(3) UL (ref 0–0.2)
BASOPHILS NFR BLD AUTO: 0.7 %
BILIRUB SERPL-MCNC: 0.4 MG/DL (ref 0.1–2)
BUN BLD-MCNC: 16 MG/DL (ref 7–18)
BUN/CREAT SERPL: 15.1 (ref 10–20)
CALCIUM BLD-MCNC: 8.3 MG/DL (ref 8.5–10.1)
CEA SERPL-MCNC: 166.9 NG/ML (ref ?–5)
CHLORIDE SERPL-SCNC: 108 MMOL/L (ref 98–112)
CO2 SERPL-SCNC: 26 MMOL/L (ref 21–32)
CREAT BLD-MCNC: 1.06 MG/DL (ref 0.7–1.3)
DEPRECATED RDW RBC AUTO: 44.9 FL (ref 35.1–46.3)
EOSINOPHIL # BLD AUTO: 0.23 X10(3) UL (ref 0–0.7)
EOSINOPHIL NFR BLD AUTO: 3.1 %
ERYTHROCYTE [DISTWIDTH] IN BLOOD BY AUTOMATED COUNT: 13.8 % (ref 11–15)
GLOBULIN PLAS-MCNC: 3.8 G/DL (ref 2.8–4.4)
GLUCOSE BLD-MCNC: 105 MG/DL (ref 70–99)
HCT VFR BLD AUTO: 41.9 % (ref 39–53)
HGB BLD-MCNC: 13.7 G/DL (ref 13–17.5)
IMM GRANULOCYTES # BLD AUTO: 0.04 X10(3) UL (ref 0–1)
IMM GRANULOCYTES NFR BLD: 0.5 %
LYMPHOCYTES # BLD AUTO: 1.62 X10(3) UL (ref 1–4)
LYMPHOCYTES NFR BLD AUTO: 22.1 %
M PROTEIN MFR SERPL ELPH: 7.1 G/DL (ref 6.4–8.2)
MCH RBC QN AUTO: 30.4 PG (ref 26–34)
MCHC RBC AUTO-ENTMCNC: 32.7 G/DL (ref 31–37)
MCV RBC AUTO: 92.9 FL (ref 80–100)
MONOCYTES # BLD AUTO: 0.62 X10(3) UL (ref 0.1–1)
MONOCYTES NFR BLD AUTO: 8.5 %
NEUTROPHILS # BLD AUTO: 4.76 X10 (3) UL (ref 1.5–7.7)
NEUTROPHILS # BLD AUTO: 4.76 X10(3) UL (ref 1.5–7.7)
NEUTROPHILS NFR BLD AUTO: 65.1 %
OSMOLALITY SERPL CALC.SUM OF ELEC: 296 MOSM/KG (ref 275–295)
PLATELET # BLD AUTO: 159 10(3)UL (ref 150–450)
POTASSIUM SERPL-SCNC: 3.9 MMOL/L (ref 3.5–5.1)
RBC # BLD AUTO: 4.51 X10(6)UL (ref 4.3–5.7)
SODIUM SERPL-SCNC: 142 MMOL/L (ref 136–145)
WBC # BLD AUTO: 7.3 X10(3) UL (ref 4–11)

## 2019-09-26 PROCEDURE — 96417 CHEMO IV INFUS EACH ADDL SEQ: CPT

## 2019-09-26 PROCEDURE — 85025 COMPLETE CBC W/AUTO DIFF WBC: CPT

## 2019-09-26 PROCEDURE — 96413 CHEMO IV INFUSION 1 HR: CPT

## 2019-09-26 PROCEDURE — 99214 OFFICE O/P EST MOD 30 MIN: CPT | Performed by: INTERNAL MEDICINE

## 2019-09-26 PROCEDURE — 80053 COMPREHEN METABOLIC PANEL: CPT

## 2019-09-26 PROCEDURE — 96375 TX/PRO/DX INJ NEW DRUG ADDON: CPT

## 2019-09-26 PROCEDURE — 82378 CARCINOEMBRYONIC ANTIGEN: CPT

## 2019-09-26 RX ORDER — ATROPINE SULFATE 0.4 MG/ML
0.2 AMPUL (ML) INJECTION ONCE
Status: CANCELLED | OUTPATIENT
Start: 2019-09-26

## 2019-09-26 RX ORDER — ATROPINE SULFATE 0.4 MG/ML
0.2 AMPUL (ML) INJECTION ONCE
Status: COMPLETED | OUTPATIENT
Start: 2019-09-26 | End: 2019-09-26

## 2019-09-26 RX ADMIN — ATROPINE SULFATE 0.2 MG: 0.4 MG/ML AMPUL (ML) INJECTION at 12:18:00

## 2019-09-26 NOTE — PROGRESS NOTES
Patient is here for MD f/cristóbal and cycle 3 of chemo. Patient is feeling well. Mild fatigue for a few days after treatment but otherwise back to normal. Eating well. Mild edema in left lower extremity.  Patient is able to do normal daily activities without diffi

## 2019-10-01 NOTE — PROGRESS NOTES
[facility]    PATIENT'S NAME: Helena Johanne   ATTENDING PHYSICIAN: Patel Nelson M.D.    PATIENT ACCOUNT #: [de-identified] LOCATION: [address] [place] [address] RECORD #: [id number] YOB: 1965   DATE OF SERVICE: 09/26/2019       CANCER ROSA pounds, blood pressure 127/84, pulse 75, respiratory rate 20, temperature 96.8. HEENT:  Unremarkable. He has pink conjunctivae, anicteric sclerae. Pharynx is without lesions. LYMPHATICS:  No cervical, supraclavicular, or axillary adenopathy.    LUNGS: 07:02:41  Job [id number]  AH/    cc: Pedro Peng M.D. [facility].  Park Monreal.

## 2019-10-11 ENCOUNTER — OFFICE VISIT (OUTPATIENT)
Dept: HEMATOLOGY/ONCOLOGY | Age: 54
End: 2019-10-11
Attending: INTERNAL MEDICINE
Payer: COMMERCIAL

## 2019-10-11 VITALS
RESPIRATION RATE: 16 BRPM | HEART RATE: 78 BPM | TEMPERATURE: 98 F | WEIGHT: 263.5 LBS | DIASTOLIC BLOOD PRESSURE: 86 MMHG | SYSTOLIC BLOOD PRESSURE: 132 MMHG | OXYGEN SATURATION: 98 % | BODY MASS INDEX: 31 KG/M2

## 2019-10-11 DIAGNOSIS — Z51.11 ENCOUNTER FOR CHEMOTHERAPY MANAGEMENT: ICD-10-CM

## 2019-10-11 DIAGNOSIS — C20 RECTAL CANCER (HCC): ICD-10-CM

## 2019-10-11 DIAGNOSIS — C78.7 SECONDARY LIVER CANCER (HCC): ICD-10-CM

## 2019-10-11 DIAGNOSIS — C18.9 MALIGNANT NEOPLASM OF COLON, UNSPECIFIED PART OF COLON (HCC): Primary | ICD-10-CM

## 2019-10-11 DIAGNOSIS — C78.02 SECONDARY ADENOCARCINOMA OF LEFT LUNG (HCC): ICD-10-CM

## 2019-10-11 DIAGNOSIS — C78.7 SECONDARY LIVER CANCER (HCC): Primary | ICD-10-CM

## 2019-10-11 DIAGNOSIS — C78.01 SECONDARY CARCINOMA OF RIGHT LUNG (HCC): ICD-10-CM

## 2019-10-11 DIAGNOSIS — I82.409 ACUTE DEEP VEIN THROMBOSIS (DVT) OF LOWER EXTREMITY, UNSPECIFIED LATERALITY, UNSPECIFIED VEIN (HCC): ICD-10-CM

## 2019-10-11 PROCEDURE — 96375 TX/PRO/DX INJ NEW DRUG ADDON: CPT

## 2019-10-11 PROCEDURE — 96413 CHEMO IV INFUSION 1 HR: CPT

## 2019-10-11 PROCEDURE — 85025 COMPLETE CBC W/AUTO DIFF WBC: CPT

## 2019-10-11 PROCEDURE — 80053 COMPREHEN METABOLIC PANEL: CPT

## 2019-10-11 PROCEDURE — 96417 CHEMO IV INFUS EACH ADDL SEQ: CPT

## 2019-10-11 PROCEDURE — 99214 OFFICE O/P EST MOD 30 MIN: CPT | Performed by: CLINICAL NURSE SPECIALIST

## 2019-10-11 PROCEDURE — 82378 CARCINOEMBRYONIC ANTIGEN: CPT

## 2019-10-11 PROCEDURE — 81003 URINALYSIS AUTO W/O SCOPE: CPT

## 2019-10-11 RX ORDER — ATROPINE SULFATE 0.4 MG/ML
0.2 AMPUL (ML) INJECTION ONCE
Status: CANCELLED | OUTPATIENT
Start: 2019-10-11

## 2019-10-11 RX ORDER — ATROPINE SULFATE 0.4 MG/ML
0.2 AMPUL (ML) INJECTION ONCE
Status: COMPLETED | OUTPATIENT
Start: 2019-10-11 | End: 2019-10-11

## 2019-10-11 RX ADMIN — ATROPINE SULFATE 0.2 MG: 0.4 MG/ML AMPUL (ML) INJECTION at 11:09:00

## 2019-10-11 NOTE — PROGRESS NOTES
ANP Visit Note    Patient Name: Jalen Rico   YOB: 1965   Medical Record Number: [id number]   CSN: [id number]   Date of visit: 10/11/2019   Skyler Omer MD   No primary care provider on file.      Chief Complaint/Reason for Visit:    Bjorn Toledo glasses/contacts       Surgical History:  Past Surgical History:   Procedure Laterality Date   • COLECTOMY  10/12/2018   • COLONOSCOPY  08/09/2018   • ILEOSTOMY TAKE DOWN N/A 4/24/2019    Performed by Chucho Melo MD at [facility] MAIN OR   • OTHER SURGICAL HIST or ex partner: Not on file        Emotionally abused: Not on file        Physically abused: Not on file        Forced sexual activity: Not on file    Other Topics      Concerns:        Caffeine Concern: Not Asked        Exercise: Not Asked        Seat Belt 10/11/19  9:31 AM   Result Value Ref Range    Urine Color Yellow Yellow    Clarity Urine Clear Clear    Spec Gravity 1.020 1.001 - 1.030    Glucose Urine Negative Negative mg/dl    Bilirubin Urine Negative Negative    Ketones Urine Negative Negative mg/dL x10(3) uL    Monocyte Absolute 0.43 0.10 - 1.00 x10(3) uL    Eosinophil Absolute 0.41 0.00 - 0.70 x10(3) uL    Basophil Absolute 0.04 0.00 - 0.20 x10(3) uL    Immature Granulocyte Absolute 0.03 0.00 - 1.00 x10(3) uL    Neutrophil % 61.9 %    Lymphocyte % 2

## 2019-10-22 ENCOUNTER — HOSPITAL ENCOUNTER (OUTPATIENT)
Dept: CT IMAGING | Facility: HOSPITAL | Age: 54
Discharge: HOME | End: 2019-10-22
Attending: CLINICAL NURSE SPECIALIST
Payer: COMMERCIAL

## 2019-10-22 DIAGNOSIS — C78.01 SECONDARY CARCINOMA OF RIGHT LUNG (HCC): ICD-10-CM

## 2019-10-22 DIAGNOSIS — C18.9 MALIGNANT NEOPLASM OF COLON, UNSPECIFIED PART OF COLON (HCC): ICD-10-CM

## 2019-10-22 DIAGNOSIS — C78.7 SECONDARY LIVER CANCER (HCC): ICD-10-CM

## 2019-10-22 DIAGNOSIS — C20 RECTAL CANCER (HCC): ICD-10-CM

## 2019-10-22 PROCEDURE — 71260 CT THORAX DX C+: CPT | Performed by: CLINICAL NURSE SPECIALIST

## 2019-10-22 PROCEDURE — 74177 CT ABD & PELVIS W/CONTRAST: CPT | Performed by: CLINICAL NURSE SPECIALIST

## 2019-10-24 ENCOUNTER — OFFICE VISIT (OUTPATIENT)
Dept: HEMATOLOGY/ONCOLOGY | Age: 54
End: 2019-10-24
Attending: INTERNAL MEDICINE
Payer: COMMERCIAL

## 2019-10-24 VITALS
DIASTOLIC BLOOD PRESSURE: 84 MMHG | HEIGHT: 77.01 IN | SYSTOLIC BLOOD PRESSURE: 133 MMHG | HEART RATE: 86 BPM | WEIGHT: 263 LBS | TEMPERATURE: 98 F | RESPIRATION RATE: 16 BRPM | BODY MASS INDEX: 31.05 KG/M2

## 2019-10-24 DIAGNOSIS — C78.7 SECONDARY LIVER CANCER (HCC): Primary | ICD-10-CM

## 2019-10-24 DIAGNOSIS — C78.02 SECONDARY ADENOCARCINOMA OF LEFT LUNG (HCC): ICD-10-CM

## 2019-10-24 DIAGNOSIS — C78.01 SECONDARY CARCINOMA OF RIGHT LUNG (HCC): ICD-10-CM

## 2019-10-24 DIAGNOSIS — C18.9 MALIGNANT NEOPLASM OF COLON, UNSPECIFIED PART OF COLON (HCC): ICD-10-CM

## 2019-10-24 DIAGNOSIS — Z51.11 ENCOUNTER FOR CHEMOTHERAPY MANAGEMENT: ICD-10-CM

## 2019-10-24 DIAGNOSIS — C20 RECTAL CANCER (HCC): ICD-10-CM

## 2019-10-24 DIAGNOSIS — R97.0 ELEVATED CARCINOEMBRYONIC ANTIGEN (CEA): ICD-10-CM

## 2019-10-24 DIAGNOSIS — I82.409 ACUTE DEEP VEIN THROMBOSIS (DVT) OF LOWER EXTREMITY, UNSPECIFIED LATERALITY, UNSPECIFIED VEIN (HCC): ICD-10-CM

## 2019-10-24 PROCEDURE — 96417 CHEMO IV INFUS EACH ADDL SEQ: CPT

## 2019-10-24 PROCEDURE — 82378 CARCINOEMBRYONIC ANTIGEN: CPT

## 2019-10-24 PROCEDURE — 99214 OFFICE O/P EST MOD 30 MIN: CPT | Performed by: CLINICAL NURSE SPECIALIST

## 2019-10-24 PROCEDURE — 96375 TX/PRO/DX INJ NEW DRUG ADDON: CPT

## 2019-10-24 PROCEDURE — 85025 COMPLETE CBC W/AUTO DIFF WBC: CPT

## 2019-10-24 PROCEDURE — 96413 CHEMO IV INFUSION 1 HR: CPT

## 2019-10-24 PROCEDURE — 80053 COMPREHEN METABOLIC PANEL: CPT

## 2019-10-24 RX ORDER — ATROPINE SULFATE 0.4 MG/ML
0.2 AMPUL (ML) INJECTION ONCE
Status: CANCELLED | OUTPATIENT
Start: 2019-10-24

## 2019-10-24 RX ORDER — ATROPINE SULFATE 0.4 MG/ML
0.2 AMPUL (ML) INJECTION ONCE
Status: COMPLETED | OUTPATIENT
Start: 2019-10-24 | End: 2019-10-24

## 2019-10-24 RX ADMIN — ATROPINE SULFATE 0.2 MG: 0.4 MG/ML AMPUL (ML) INJECTION at 12:19:00

## 2019-10-24 NOTE — PROGRESS NOTES
ANP Visit Note    Patient Name: Christian Marks   YOB: 1965   Medical Record Number: [id number]   CSN: [id number]   Date of visit: 10/24/2019   Shira Ramirez MD   No primary care provider on file.      Chief Complaint/Reason for Visit:  Shyla Barraza adenomatous polyp     Adenomatous polyp of colon     Secondary liver cancer ([facility] [address]     Secondary carcinoma of right lung ([facility] [address]     Secondary adenocarcinoma of left lung (HCC)     Anemia, unspecified     Acute deep vein thrombosis (DVT) of popliteal vein of day      Drug use: No      Sexual activity: Not on file    Lifestyle      Physical activity:        Days per week: Not on file        Minutes per session: Not on file      Stress: Not on file    Relationships      Social connections:        Talks on phone: °F (36.8 °C) (Tympanic)   Resp 16   Ht 1.956 m (6' 5.01\")   Wt 119.3 kg (263 lb)   BMI 31.18 kg/m²   HEENT: EOMs intact. PERRL. Oropharynx is clear. Neck: No JVD. No palpable lymphadenopathy. Neck is supple. Chest: Clear to auscultation.   Heart: Regula Eosinophil Absolute 0.38 0.00 - 0.70 x10(3) uL    Basophil Absolute 0.04 0.00 - 0.20 x10(3) uL    Immature Granulocyte Absolute 0.03 0.00 - 1.00 x10(3) uL    Neutrophil % 71.1 %    Lymphocyte % 15.1 %    Monocyte % 8.3 %    Eosinophil % 4.6 %    Basophil % seen.  CARDIAC:  No enlargement, pericardial thickening, or significant calcification. PLEURA:  No mass or effusion. CHEST WALL:  Stable appearing compression deformity of the superior endplate of L78.   Stable appearing mild compression deformity invol exam.  ABDOMINAL WALL:  Postsurgical changes of the anterior abdominal wall again noted. This appears slightly less prominent than on the prior exam.  This is most prominent within the right lower quadrant. No residual fluid collection is seen.   URINARY his disease progressed aggressively when the chemo was held upon last progression while awaiting insurance approval.   2. History of DVT: Continue Rivaroxaban      Emotional Well Being:  I have assessed the patient's emotional well-being and any concerns a

## 2019-10-24 NOTE — PROGRESS NOTES
Pt here for C5D1.   Arrives Ambulating independently, accompanied by Self           Patient denies possible pregnancy since last therapy cycle: Not Applicable    Modifications in dose or schedule: No     Frequency of blood return and site check throughout a

## 2019-11-06 ENCOUNTER — TELEPHONE (OUTPATIENT)
Dept: HEMATOLOGY/ONCOLOGY | Facility: HOSPITAL | Age: 54
End: 2019-11-06

## 2019-11-07 ENCOUNTER — OFFICE VISIT (OUTPATIENT)
Dept: HEMATOLOGY/ONCOLOGY | Age: 54
End: 2019-11-07
Attending: INTERNAL MEDICINE
Payer: COMMERCIAL

## 2019-11-07 VITALS
RESPIRATION RATE: 16 BRPM | DIASTOLIC BLOOD PRESSURE: 95 MMHG | TEMPERATURE: 98 F | HEART RATE: 77 BPM | BODY MASS INDEX: 31 KG/M2 | WEIGHT: 259 LBS | OXYGEN SATURATION: 99 % | SYSTOLIC BLOOD PRESSURE: 156 MMHG

## 2019-11-07 DIAGNOSIS — C78.7 SECONDARY LIVER CANCER (HCC): ICD-10-CM

## 2019-11-07 DIAGNOSIS — C78.02 SECONDARY ADENOCARCINOMA OF LEFT LUNG (HCC): ICD-10-CM

## 2019-11-07 DIAGNOSIS — C78.01 SECONDARY CARCINOMA OF RIGHT LUNG (HCC): ICD-10-CM

## 2019-11-07 DIAGNOSIS — C20 RECTAL CANCER (HCC): ICD-10-CM

## 2019-11-07 DIAGNOSIS — C20 RECTAL CANCER (HCC): Primary | ICD-10-CM

## 2019-11-07 DIAGNOSIS — C78.7 SECONDARY LIVER CANCER (HCC): Primary | ICD-10-CM

## 2019-11-07 PROCEDURE — 96375 TX/PRO/DX INJ NEW DRUG ADDON: CPT

## 2019-11-07 PROCEDURE — 85025 COMPLETE CBC W/AUTO DIFF WBC: CPT

## 2019-11-07 PROCEDURE — 96417 CHEMO IV INFUS EACH ADDL SEQ: CPT

## 2019-11-07 PROCEDURE — 82378 CARCINOEMBRYONIC ANTIGEN: CPT

## 2019-11-07 PROCEDURE — 99214 OFFICE O/P EST MOD 30 MIN: CPT | Performed by: INTERNAL MEDICINE

## 2019-11-07 PROCEDURE — 80053 COMPREHEN METABOLIC PANEL: CPT

## 2019-11-07 PROCEDURE — 96413 CHEMO IV INFUSION 1 HR: CPT

## 2019-11-07 RX ORDER — ATROPINE SULFATE 0.4 MG/ML
0.2 AMPUL (ML) INJECTION ONCE
Status: COMPLETED | OUTPATIENT
Start: 2019-11-07 | End: 2019-11-07

## 2019-11-07 RX ADMIN — ATROPINE SULFATE 0.2 MG: 0.4 MG/ML AMPUL (ML) INJECTION at 12:42:00

## 2019-11-07 NOTE — PROGRESS NOTES
Pt here for C6D1.   Arrives Ambulating independently, accompanied by Self           Modifications in dose or schedule: No     Frequency of blood return and site check throughout administration: Prior to administration   Discharged to Home, Ambulating indepe

## 2019-11-07 NOTE — PROGRESS NOTES
Patient is here for MD f/u and chemotherapy. Patient is feeling well. Some fatigue 2-3 days after treatment. Appetite good. Tingling of fingertips, unchanged.  Denies pain.       Education Record     Learner:  Patient     Disease / Diagnosis:  Rectal Cancer

## 2019-11-08 ENCOUNTER — DOCUMENTATION ONLY (OUTPATIENT)
Dept: HEMATOLOGY/ONCOLOGY | Facility: HOSPITAL | Age: 54
End: 2019-11-08

## 2019-11-12 ENCOUNTER — OFFICE VISIT (OUTPATIENT)
Dept: SURGERY | Facility: CLINIC | Age: 54
End: 2019-11-12
Payer: COMMERCIAL

## 2019-11-12 VITALS
HEIGHT: 77 IN | HEART RATE: 81 BPM | WEIGHT: 259 LBS | SYSTOLIC BLOOD PRESSURE: 130 MMHG | DIASTOLIC BLOOD PRESSURE: 88 MMHG | BODY MASS INDEX: 30.58 KG/M2

## 2019-11-12 DIAGNOSIS — Z83.71 FAMILY HISTORY OF COLONIC POLYPS: ICD-10-CM

## 2019-11-12 DIAGNOSIS — L29.0 PRURITUS ANI: ICD-10-CM

## 2019-11-12 DIAGNOSIS — C20 RECTAL CANCER (HCC): ICD-10-CM

## 2019-11-12 DIAGNOSIS — C78.7 SECONDARY LIVER CANCER (HCC): ICD-10-CM

## 2019-11-12 DIAGNOSIS — C78.02 SECONDARY ADENOCARCINOMA OF LEFT LUNG (HCC): ICD-10-CM

## 2019-11-12 DIAGNOSIS — C18.6 CANCER OF DESCENDING COLON (HCC): Primary | ICD-10-CM

## 2019-11-12 DIAGNOSIS — C78.01 SECONDARY CARCINOMA OF RIGHT LUNG (HCC): ICD-10-CM

## 2019-11-12 PROCEDURE — 99213 OFFICE O/P EST LOW 20 MIN: CPT | Performed by: COLON & RECTAL SURGERY

## 2019-11-12 NOTE — PROGRESS NOTES
Follow Up Visit Note       Active Problems      1. Cancer of descending colon ([facility] [address] approximately 40 cm, within a large adenomatous polyp    2. Rectal cancer (HCC) at 15 cm between the second and third rectal folds    3. Secondary liver cancer ([facility] [address]    4. 2018.  The patient was subsequently started on FOLFOX and Avastin.     His last CT of the chest, abdomen and pelvis was on October 22, 2019. This demonstrated progressing liver metastasis and a new lesion. The pulmonary lesions were stable.     The patien options. PROCEDURE:  CT CHEST+ABDOMEN+PELVIS(ALL CNTRST ONLY)(CPT=71260/88400)     COMPARISON:  [facility], CT, CT CHEST (CPT=71250), 8/29/2019, 8:26.   Chest abdomen pelvis CT 7/6/2019     INDICATIONS:  C20 Malignant neoplasm of rectum C78.01 Manoj Pantoja cm compared with 1.3 x 1.4 cm on the prior study of July 2019. Numerous other lesions have increased in size. Representative   lesion of the posterior right hepatic lobe (image 135) measures 1.8 x 1.6 cm and previously measured 8 mm in diameter.   Subtle l 207).  BONES:  No new lesion is seen.     =====  CONCLUSION:  When compared with the prior available exam of July 2019 there has been progression of hepatic metastatic disease with new lesions and interval increase in size of several representative hepatic Diabetes Father         borderline   • Lipids Father    • Obesity Father    • Lipids Mother    • Obesity Mother      Social History    Socioeconomic History      Marital status:       Spouse name: Not on file      Number of children: Not on file Hematological: Negative for adenopathy. Does not bruise/bleed easily. Psychiatric/Behavioral: Negative for behavioral problems and sleep disturbance.         Physical Findings   /88 (BP Location: Right arm)   Pulse 81   Ht 77\"   Wt 259 lb (117.5 and not tender.     Genitourinary Comments: No Prostate Nodule  Anal Sphincter Intact  No Pruritis Ani  No Lichenification  No Abscess  No Fistula in ano  No Anterior Fissure  No Posterior Fissure  No Pilonidal Cyst    See Procedures:  Anoscopy    Clinical states that he is maintaining his weight. He denies any nausea, vomiting, fevers, or chills.     His presenting history is below:      This patient's history starts with a colonoscopy for screening purposes on August 9, 2018. [facility] was found to have 2 polyps is now taking Xarelto. This was thought to be secondary from Avastin during his chemotherapy.     Clinical examination of the abdomen reveals it to be soft, nondistended, nontender, bowel sounds are normal activity normal pitch.   There  is no rebounding te

## 2019-11-13 NOTE — PATIENT INSTRUCTIONS
Gabrielle Lawton is a 48year old male here for continued care following his rectal and colon cancer.      The patient states that he has no complaints at this time. He states that he is tolerating regular diet.   He does state that he gets occasional bloo decreasing. He restarted chemotherapy 3-5 weeks post operatively from his takedown procedure. His last dose of chemotherapy was November 7, 2019. He is currently receiving chemotherapy every other week.   The patient is still having difficulty with getting

## 2019-11-21 ENCOUNTER — OFFICE VISIT (OUTPATIENT)
Dept: HEMATOLOGY/ONCOLOGY | Age: 54
End: 2019-11-21
Attending: INTERNAL MEDICINE
Payer: COMMERCIAL

## 2019-11-21 VITALS
BODY MASS INDEX: 31 KG/M2 | TEMPERATURE: 98 F | DIASTOLIC BLOOD PRESSURE: 79 MMHG | OXYGEN SATURATION: 99 % | HEART RATE: 79 BPM | WEIGHT: 262 LBS | SYSTOLIC BLOOD PRESSURE: 116 MMHG | RESPIRATION RATE: 16 BRPM

## 2019-11-21 DIAGNOSIS — C20 RECTAL CANCER (HCC): ICD-10-CM

## 2019-11-21 DIAGNOSIS — C78.01 SECONDARY CARCINOMA OF RIGHT LUNG (HCC): ICD-10-CM

## 2019-11-21 DIAGNOSIS — C78.7 SECONDARY LIVER CANCER (HCC): Primary | ICD-10-CM

## 2019-11-21 DIAGNOSIS — C78.02 SECONDARY ADENOCARCINOMA OF LEFT LUNG (HCC): ICD-10-CM

## 2019-11-21 DIAGNOSIS — C18.7 MALIGNANT NEOPLASM OF SIGMOID COLON (HCC): Primary | ICD-10-CM

## 2019-11-21 DIAGNOSIS — C78.7 SECONDARY LIVER CANCER (HCC): ICD-10-CM

## 2019-11-21 PROCEDURE — 80053 COMPREHEN METABOLIC PANEL: CPT

## 2019-11-21 PROCEDURE — 82378 CARCINOEMBRYONIC ANTIGEN: CPT

## 2019-11-21 PROCEDURE — 85025 COMPLETE CBC W/AUTO DIFF WBC: CPT

## 2019-11-21 PROCEDURE — 99214 OFFICE O/P EST MOD 30 MIN: CPT | Performed by: INTERNAL MEDICINE

## 2019-11-21 PROCEDURE — 36591 DRAW BLOOD OFF VENOUS DEVICE: CPT

## 2019-11-21 NOTE — PROGRESS NOTES
Patient is here for MD tan/cristóbal and cycle 7 of chemo. Patient reports he has constipation for a few days after chemo. Denies nausea or vomiting. Eating well. Energy level is good. He does have generalized pruritis and believes this to be due to dry skin.

## 2019-11-22 ENCOUNTER — SOCIAL WORK SERVICES (OUTPATIENT)
Dept: HEMATOLOGY/ONCOLOGY | Facility: HOSPITAL | Age: 54
End: 2019-11-22

## 2019-11-22 NOTE — PROGRESS NOTES
SW completed a letter of Medical Necessity and e-mailed it to the patient at [facility]. [email or web address]  As requested.

## 2019-11-26 ENCOUNTER — HOSPITAL ENCOUNTER (OUTPATIENT)
Dept: CT IMAGING | Age: 54
Discharge: HOME | End: 2019-11-26
Attending: INTERNAL MEDICINE
Payer: COMMERCIAL

## 2019-11-26 DIAGNOSIS — C18.7 MALIGNANT NEOPLASM OF SIGMOID COLON (HCC): ICD-10-CM

## 2019-11-26 PROCEDURE — 71260 CT THORAX DX C+: CPT | Performed by: INTERNAL MEDICINE

## 2019-11-26 PROCEDURE — 74177 CT ABD & PELVIS W/CONTRAST: CPT | Performed by: INTERNAL MEDICINE

## 2019-11-26 NOTE — PROGRESS NOTES
[address]    PATIENT'S NAME: Mj Garza   ATTENDING PHYSICIAN: Jenni Cardenas M.D.    PATIENT ACCOUNT #: [de-identified] LOCATION: [address] [place] [address] RECORD #: [id number] YOB: 1965   DATE OF SERVICE: 11/21/2019       CANCER ROSA EXTREMITIES:  He has no clubbing, cyanosis, or edema. NEUROLOGIC:  He is intact. LABORATORY DATA:  White count is 6.5, hemoglobin 12.9, platelets are 499. His CEA has gone up again from 331 to 404.   His chemistries otherwise are normal.      FRANKY

## 2019-11-27 ENCOUNTER — OFFICE VISIT (OUTPATIENT)
Dept: HEMATOLOGY/ONCOLOGY | Age: 54
End: 2019-11-27
Attending: INTERNAL MEDICINE
Payer: COMMERCIAL

## 2019-11-27 VITALS
HEART RATE: 76 BPM | DIASTOLIC BLOOD PRESSURE: 80 MMHG | WEIGHT: 258.5 LBS | TEMPERATURE: 97 F | SYSTOLIC BLOOD PRESSURE: 124 MMHG | RESPIRATION RATE: 16 BRPM | HEIGHT: 77 IN | BODY MASS INDEX: 30.52 KG/M2 | OXYGEN SATURATION: 98 %

## 2019-11-27 DIAGNOSIS — Z71.9 ENCOUNTER FOR HEALTH EDUCATION: ICD-10-CM

## 2019-11-27 DIAGNOSIS — C78.7 SECONDARY LIVER CANCER (HCC): ICD-10-CM

## 2019-11-27 DIAGNOSIS — C78.02 SECONDARY ADENOCARCINOMA OF LEFT LUNG (HCC): ICD-10-CM

## 2019-11-27 DIAGNOSIS — C78.01 SECONDARY CARCINOMA OF RIGHT LUNG (HCC): ICD-10-CM

## 2019-11-27 DIAGNOSIS — C18.7 MALIGNANT NEOPLASM OF SIGMOID COLON (HCC): Primary | ICD-10-CM

## 2019-11-27 DIAGNOSIS — C20 RECTAL CANCER (HCC): ICD-10-CM

## 2019-11-27 DIAGNOSIS — C78.7 SECONDARY LIVER CANCER (HCC): Primary | ICD-10-CM

## 2019-11-27 PROCEDURE — 99213 OFFICE O/P EST LOW 20 MIN: CPT | Performed by: CLINICAL NURSE SPECIALIST

## 2019-11-27 PROCEDURE — 96413 CHEMO IV INFUSION 1 HR: CPT

## 2019-11-27 PROCEDURE — 80053 COMPREHEN METABOLIC PANEL: CPT

## 2019-11-27 PROCEDURE — 82378 CARCINOEMBRYONIC ANTIGEN: CPT

## 2019-11-27 PROCEDURE — 96417 CHEMO IV INFUS EACH ADDL SEQ: CPT

## 2019-11-27 PROCEDURE — 85025 COMPLETE CBC W/AUTO DIFF WBC: CPT

## 2019-11-27 RX ORDER — DIPHENHYDRAMINE HCL 25 MG
25 CAPSULE ORAL ONCE
Status: CANCELLED | OUTPATIENT
Start: 2019-11-27

## 2019-11-27 RX ORDER — DIPHENHYDRAMINE HCL 25 MG
25 CAPSULE ORAL ONCE
Status: COMPLETED | OUTPATIENT
Start: 2019-11-27 | End: 2019-11-27

## 2019-11-27 RX ORDER — ACETAMINOPHEN 325 MG/1
650 TABLET ORAL ONCE
Status: CANCELLED | OUTPATIENT
Start: 2019-11-27

## 2019-11-27 RX ORDER — ACETAMINOPHEN 325 MG/1
650 TABLET ORAL ONCE
Status: COMPLETED | OUTPATIENT
Start: 2019-11-27 | End: 2019-11-27

## 2019-11-27 RX ADMIN — ACETAMINOPHEN 650 MG: 325 TABLET ORAL at 11:24:00

## 2019-11-27 RX ADMIN — DIPHENHYDRAMINE HCL 25 MG: 25 MG CAPSULE ORAL at 11:24:00

## 2019-11-27 NOTE — PROGRESS NOTES
Chemotherapy Education    Learner:  Patient    Barriers / Limitations:  None    Chemotherapy education goals:  · Learn the drug names  · Administration schedule  · Routes of administration  · Treatment setting    Drug names:  Kathy Diaz changes or problems:  Achieved    Comments:    Treatment Effects on Emotional Status:    Potential mood changes, depression, nervousness, difficulty sleeping:  Achieved    Importance of support system:  Achieved    Notify MD/RN of any emotional changes:  A

## 2019-11-27 NOTE — PROGRESS NOTES
Pt here for C1D1.   Arrives Ambulating independently, accompanied by Self          Modifications in dose or schedule: No     Frequency of blood return and site check throughout administration: Prior to administration   Discharged to Home, Ambulating indepen

## 2019-12-02 ENCOUNTER — TELEPHONE (OUTPATIENT)
Dept: HEMATOLOGY/ONCOLOGY | Facility: HOSPITAL | Age: 54
End: 2019-12-02

## 2019-12-02 PROBLEM — C79.51 SECONDARY CANCER OF BONE (HCC): Status: ACTIVE | Noted: 2019-12-02

## 2019-12-04 ENCOUNTER — APPOINTMENT (OUTPATIENT)
Dept: HEMATOLOGY/ONCOLOGY | Age: 54
End: 2019-12-04
Attending: INTERNAL MEDICINE
Payer: COMMERCIAL

## 2019-12-05 NOTE — PROGRESS NOTES
Nursing Consultation Note  Patient: Christian Marks  YOB: 1965  Age: 48year old  Radiation Oncologist: Dr. Ernst Manuel  Referring Physician: Dr. Daniel Smith  Diagnosis: METASTATIC COLON CANCER  Consult Date: 12/6/201 with steady gait. Review of Systems   Constitutional: Negative. HENT: Negative. Eyes: Negative. Respiratory: Negative. Cardiovascular: Negative. Gastrointestinal: Negative.          Occ blood in stool, crystal after chemo but resolves on it ANTERIOR COLON RESECTION N/A 10/12/2018    Performed by Jhonatan Meza MD at [facility] MAIN OR       Social History    Socioeconomic History      Marital status:       Spouse name: Not on file      Number of children: 2      Years of education: Not on file Of Care:    Problem:  Knowledge Deficit    Problems related to:    Radiation therapy    Interventions:   Instruct on purpose of radiation therapy    Expected Outcomes:  Knowledge of radiation therapy    Progress Toward Outcome:  Making progress    Pamphlets

## 2019-12-06 ENCOUNTER — HOSPITAL ENCOUNTER (OUTPATIENT)
Dept: RADIATION ONCOLOGY | Age: 54
Discharge: HOME | End: 2019-12-06
Attending: RADIOLOGY
Payer: COMMERCIAL

## 2019-12-06 ENCOUNTER — OFFICE VISIT (OUTPATIENT)
Dept: HEMATOLOGY/ONCOLOGY | Age: 54
End: 2019-12-06
Attending: INTERNAL MEDICINE
Payer: COMMERCIAL

## 2019-12-06 VITALS
DIASTOLIC BLOOD PRESSURE: 98 MMHG | SYSTOLIC BLOOD PRESSURE: 157 MMHG | BODY MASS INDEX: 30.86 KG/M2 | HEIGHT: 77 IN | WEIGHT: 261.38 LBS | RESPIRATION RATE: 16 BRPM | TEMPERATURE: 98 F | HEART RATE: 78 BPM | OXYGEN SATURATION: 98 %

## 2019-12-06 DIAGNOSIS — C78.02 SECONDARY ADENOCARCINOMA OF LEFT LUNG (HCC): ICD-10-CM

## 2019-12-06 DIAGNOSIS — C20 RECTAL CANCER (HCC): ICD-10-CM

## 2019-12-06 DIAGNOSIS — C78.7 SECONDARY LIVER CANCER (HCC): Primary | ICD-10-CM

## 2019-12-06 DIAGNOSIS — C79.51 SECONDARY CANCER OF BONE (HCC): ICD-10-CM

## 2019-12-06 DIAGNOSIS — C18.6 CANCER OF DESCENDING COLON (HCC): ICD-10-CM

## 2019-12-06 DIAGNOSIS — C78.01 SECONDARY CARCINOMA OF RIGHT LUNG (HCC): ICD-10-CM

## 2019-12-06 PROCEDURE — 99214 OFFICE O/P EST MOD 30 MIN: CPT

## 2019-12-06 PROCEDURE — 96374 THER/PROPH/DIAG INJ IV PUSH: CPT

## 2019-12-06 NOTE — CONSULTS
[facility]  CONSULTATION     PATIENT:   Edda Landon      6/4/1965    REFERRING MD:  Sami Shea MD  DIAGNOSIS:   Metastatic colon cancer with bone met   CC:    L groin pain    HPI   53-year-old man here for consult.   Hi and a symptomatic left pubic bone lytic metastasis    -recommend 30 Gy in 10 fractions to the bone met  -perhaps may have transient dysuria from RT  -should not have GI side effects    -started Perjeta and Herceptin last week  -started Zometa today    Oh-H

## 2019-12-06 NOTE — PATIENT INSTRUCTIONS
- CT SIMULATION/MAPPING SCHEDULED FOR WED.  (12/11) AT 2PM    - IF YOU HAVE ANY QUESTIONS OR CONCERNS REGARDING RADIATION THERAPY, PLEASE CALL [phone]

## 2019-12-11 ENCOUNTER — HOSPITAL ENCOUNTER (OUTPATIENT)
Dept: RADIATION ONCOLOGY | Age: 54
Discharge: HOME | End: 2019-12-11
Attending: RADIOLOGY
Payer: COMMERCIAL

## 2019-12-11 PROCEDURE — 77334 RADIATION TREATMENT AID(S): CPT | Performed by: RADIOLOGY

## 2019-12-11 PROCEDURE — 77290 THER RAD SIMULAJ FIELD CPLX: CPT | Performed by: RADIOLOGY

## 2019-12-11 PROCEDURE — 77470 SPECIAL RADIATION TREATMENT: CPT | Performed by: RADIOLOGY

## 2019-12-12 PROCEDURE — 77300 RADIATION THERAPY DOSE PLAN: CPT | Performed by: RADIOLOGY

## 2019-12-12 PROCEDURE — 77295 3-D RADIOTHERAPY PLAN: CPT | Performed by: RADIOLOGY

## 2019-12-12 PROCEDURE — 77334 RADIATION TREATMENT AID(S): CPT | Performed by: RADIOLOGY

## 2019-12-18 ENCOUNTER — HOSPITAL ENCOUNTER (OUTPATIENT)
Dept: RADIATION ONCOLOGY | Age: 54
Discharge: HOME | End: 2019-12-18
Attending: RADIOLOGY
Payer: COMMERCIAL

## 2019-12-18 PROCEDURE — 77412 RADIATION TX DELIVERY LVL 3: CPT | Performed by: RADIOLOGY

## 2019-12-18 PROCEDURE — 77280 THER RAD SIMULAJ FIELD SMPL: CPT | Performed by: RADIOLOGY

## 2019-12-19 ENCOUNTER — OFFICE VISIT (OUTPATIENT)
Dept: HEMATOLOGY/ONCOLOGY | Age: 54
End: 2019-12-19
Attending: INTERNAL MEDICINE
Payer: COMMERCIAL

## 2019-12-19 VITALS
BODY MASS INDEX: 30.76 KG/M2 | RESPIRATION RATE: 18 BRPM | TEMPERATURE: 98 F | WEIGHT: 260.5 LBS | DIASTOLIC BLOOD PRESSURE: 82 MMHG | HEIGHT: 77.01 IN | SYSTOLIC BLOOD PRESSURE: 138 MMHG | HEART RATE: 72 BPM | OXYGEN SATURATION: 96 %

## 2019-12-19 DIAGNOSIS — C78.02 SECONDARY ADENOCARCINOMA OF LEFT LUNG (HCC): ICD-10-CM

## 2019-12-19 DIAGNOSIS — C20 RECTAL CANCER (HCC): ICD-10-CM

## 2019-12-19 DIAGNOSIS — C79.51 SECONDARY CANCER OF BONE (HCC): ICD-10-CM

## 2019-12-19 DIAGNOSIS — C78.01 SECONDARY CARCINOMA OF RIGHT LUNG (HCC): ICD-10-CM

## 2019-12-19 DIAGNOSIS — C78.7 SECONDARY LIVER CANCER (HCC): Primary | ICD-10-CM

## 2019-12-19 PROCEDURE — 77412 RADIATION TX DELIVERY LVL 3: CPT | Performed by: RADIOLOGY

## 2019-12-19 PROCEDURE — 99214 OFFICE O/P EST MOD 30 MIN: CPT | Performed by: INTERNAL MEDICINE

## 2019-12-19 PROCEDURE — 96413 CHEMO IV INFUSION 1 HR: CPT

## 2019-12-19 PROCEDURE — 77387 GUIDANCE FOR RADJ TX DLVR: CPT | Performed by: RADIOLOGY

## 2019-12-19 PROCEDURE — 82378 CARCINOEMBRYONIC ANTIGEN: CPT

## 2019-12-19 PROCEDURE — 77331 SPECIAL RADIATION DOSIMETRY: CPT | Performed by: RADIOLOGY

## 2019-12-19 PROCEDURE — 80053 COMPREHEN METABOLIC PANEL: CPT

## 2019-12-19 PROCEDURE — 85025 COMPLETE CBC W/AUTO DIFF WBC: CPT

## 2019-12-19 PROCEDURE — 96417 CHEMO IV INFUS EACH ADDL SEQ: CPT

## 2019-12-19 NOTE — PROGRESS NOTES
Patient is here for MD f/u and cycle 2 of Herceptin and Perjeta. Tolerating well. Patient started radiation to the left groin yesterday. He will receive 10 fractions. Appetite and energy level is good.        Education Record    Learner:  Patient    Disease

## 2019-12-19 NOTE — PROGRESS NOTES
Pt here for C2D1.   Arrives Ambulating independently, accompanied by Self           Patient reports possible pregnancy since last therapy cycle: Not Applicable    Modifications in dose or schedule: No     Frequency of blood return and site check throughout

## 2019-12-20 PROCEDURE — 77412 RADIATION TX DELIVERY LVL 3: CPT | Performed by: RADIOLOGY

## 2019-12-20 PROCEDURE — 77387 GUIDANCE FOR RADJ TX DLVR: CPT | Performed by: RADIOLOGY

## 2019-12-20 NOTE — PROGRESS NOTES
[facility] Radiation Treatment Management Note 1-5    Patient:  Gabriela Blair  Age:  48year old  Visit Diagnosis:  Left pubic bone met (colon CA)  Primary Rad/Onc:  Dr. Leila Boone    Site Delivered Dose (Gy) Prescribed Dose (Gy

## 2019-12-23 ENCOUNTER — HOSPITAL ENCOUNTER (OUTPATIENT)
Dept: RADIATION ONCOLOGY | Age: 54
Discharge: HOME | End: 2019-12-23
Attending: RADIOLOGY
Payer: COMMERCIAL

## 2019-12-23 VITALS
TEMPERATURE: 98 F | RESPIRATION RATE: 16 BRPM | HEART RATE: 76 BPM | OXYGEN SATURATION: 98 % | DIASTOLIC BLOOD PRESSURE: 85 MMHG | WEIGHT: 257.13 LBS | SYSTOLIC BLOOD PRESSURE: 125 MMHG | BODY MASS INDEX: 30 KG/M2

## 2019-12-23 DIAGNOSIS — C79.51 SECONDARY CANCER OF BONE (HCC): Primary | ICD-10-CM

## 2019-12-23 PROCEDURE — 77387 GUIDANCE FOR RADJ TX DLVR: CPT | Performed by: RADIOLOGY

## 2019-12-23 PROCEDURE — 77412 RADIATION TX DELIVERY LVL 3: CPT | Performed by: RADIOLOGY

## 2019-12-24 PROCEDURE — 77387 GUIDANCE FOR RADJ TX DLVR: CPT | Performed by: RADIOLOGY

## 2019-12-24 PROCEDURE — 77412 RADIATION TX DELIVERY LVL 3: CPT | Performed by: RADIOLOGY

## 2019-12-25 NOTE — PROGRESS NOTES
[address]    PATIENT'S NAME: Andreas Gonzalez   ATTENDING PHYSICIAN: Melanie Mathews M.D.    PATIENT ACCOUNT #: [de-identified] LOCATION: [address] [place] [address] RECORD #: [id number] YOB: 1965   DATE OF SERVICE: 12/19/2019       CANCER ROSA hepatosplenomegaly or tenderness. EXTREMITIES:  No clubbing, cyanosis, or edema. NEUROLOGIC:  He is intact.     LABORATORY DATA:  His CEA went up, though it slowed down, it was climbing more rapidly having gone from 404 to 476 prior to starting the concur

## 2019-12-26 PROCEDURE — 77412 RADIATION TX DELIVERY LVL 3: CPT | Performed by: RADIOLOGY

## 2019-12-26 PROCEDURE — 77387 GUIDANCE FOR RADJ TX DLVR: CPT | Performed by: RADIOLOGY

## 2019-12-27 PROCEDURE — 77412 RADIATION TX DELIVERY LVL 3: CPT | Performed by: RADIOLOGY

## 2019-12-27 PROCEDURE — 77336 RADIATION PHYSICS CONSULT: CPT | Performed by: RADIOLOGY

## 2019-12-27 PROCEDURE — 77387 GUIDANCE FOR RADJ TX DLVR: CPT | Performed by: RADIOLOGY

## 2019-12-27 NOTE — PATIENT INSTRUCTIONS
POST-RADIATION INSTRUCTIONS:   - CALL [phone] FOR A FOLLOW-UP WITH DR. SONI 3 MONTHS AFTER RADIATION COMPLETION (MARCH 2020)   - SIDE EFFECTS OF RADIATION WILL GRADUALLY SUBSIDE, IT MAY TAKE 2-3 WEEKS POST-RADIATION FOR YOU TO NOTICE CHANGES; SUCH A

## 2019-12-30 ENCOUNTER — HOSPITAL ENCOUNTER (OUTPATIENT)
Dept: RADIATION ONCOLOGY | Age: 54
Discharge: HOME | End: 2019-12-30
Attending: RADIOLOGY
Payer: COMMERCIAL

## 2019-12-30 VITALS
SYSTOLIC BLOOD PRESSURE: 131 MMHG | BODY MASS INDEX: 31 KG/M2 | RESPIRATION RATE: 16 BRPM | TEMPERATURE: 98 F | HEART RATE: 76 BPM | WEIGHT: 259.63 LBS | DIASTOLIC BLOOD PRESSURE: 88 MMHG | OXYGEN SATURATION: 96 %

## 2019-12-30 DIAGNOSIS — C79.51 SECONDARY CANCER OF BONE (HCC): Primary | ICD-10-CM

## 2019-12-30 PROCEDURE — 77387 GUIDANCE FOR RADJ TX DLVR: CPT | Performed by: RADIOLOGY

## 2019-12-30 PROCEDURE — 77412 RADIATION TX DELIVERY LVL 3: CPT | Performed by: RADIOLOGY

## 2019-12-30 NOTE — PROGRESS NOTES
[facility] Radiation Treatment Management Note 6-10    Patient:  Daniele Zazueta  Age:  48year old  Visit Diagnosis:  Left pubic bone met (colon CA)  Primary Rad/Onc:  Dr. Malik Juarez    Site Delivered Dose (Gy) Prescribed Dose (G

## 2019-12-31 PROCEDURE — 77387 GUIDANCE FOR RADJ TX DLVR: CPT | Performed by: RADIOLOGY

## 2019-12-31 PROCEDURE — 77412 RADIATION TX DELIVERY LVL 3: CPT | Performed by: RADIOLOGY

## 2020-01-01 ENCOUNTER — OFFICE VISIT (OUTPATIENT)
Dept: PHYSICAL THERAPY | Facility: HOSPITAL | Age: 55
End: 2020-01-01
Attending: RADIOLOGY
Payer: COMMERCIAL

## 2020-01-01 ENCOUNTER — SOCIAL WORK SERVICES (OUTPATIENT)
Dept: HEMATOLOGY/ONCOLOGY | Facility: HOSPITAL | Age: 55
End: 2020-01-01

## 2020-01-01 ENCOUNTER — HOSPITAL ENCOUNTER (OUTPATIENT)
Dept: CV DIAGNOSTICS | Facility: HOSPITAL | Age: 55
Discharge: HOME | End: 2020-01-01
Attending: INTERNAL MEDICINE
Payer: COMMERCIAL

## 2020-01-01 ENCOUNTER — TELEPHONE (OUTPATIENT)
Dept: RADIATION ONCOLOGY | Facility: HOSPITAL | Age: 55
End: 2020-01-01

## 2020-01-01 ENCOUNTER — APPOINTMENT (OUTPATIENT)
Dept: HEMATOLOGY/ONCOLOGY | Facility: HOSPITAL | Age: 55
End: 2020-01-01
Attending: NEUROLOGICAL SURGERY
Payer: COMMERCIAL

## 2020-01-01 ENCOUNTER — HOSPITAL ENCOUNTER (OUTPATIENT)
Dept: RADIATION ONCOLOGY | Facility: HOSPITAL | Age: 55
Discharge: HOME | End: 2020-01-01
Attending: RADIOLOGY
Payer: COMMERCIAL

## 2020-01-01 ENCOUNTER — DIETICIAN VISIT (OUTPATIENT)
Dept: HEMATOLOGY/ONCOLOGY | Facility: HOSPITAL | Age: 55
End: 2020-01-01

## 2020-01-01 ENCOUNTER — TELEPHONE (OUTPATIENT)
Dept: PHYSICAL THERAPY | Age: 55
End: 2020-01-01

## 2020-01-01 ENCOUNTER — OFFICE VISIT (OUTPATIENT)
Dept: HEMATOLOGY/ONCOLOGY | Age: 55
End: 2020-01-01
Attending: INTERNAL MEDICINE
Payer: COMMERCIAL

## 2020-01-01 ENCOUNTER — OFFICE VISIT (OUTPATIENT)
Dept: HEMATOLOGY/ONCOLOGY | Age: 55
End: 2020-01-01
Attending: NEUROLOGICAL SURGERY
Payer: COMMERCIAL

## 2020-01-01 ENCOUNTER — OFFICE VISIT (OUTPATIENT)
Dept: PHYSICAL THERAPY | Age: 55
End: 2020-01-01
Attending: PHYSICIAN ASSISTANT
Payer: COMMERCIAL

## 2020-01-01 ENCOUNTER — PATIENT MESSAGE (OUTPATIENT)
Dept: HEMATOLOGY/ONCOLOGY | Age: 55
End: 2020-01-01

## 2020-01-01 ENCOUNTER — HOSPITAL ENCOUNTER (OUTPATIENT)
Dept: MRI IMAGING | Age: 55
Discharge: HOME | End: 2020-01-01
Attending: PHYSICIAN ASSISTANT
Payer: COMMERCIAL

## 2020-01-01 ENCOUNTER — HOSPITAL ENCOUNTER (OUTPATIENT)
Dept: MRI IMAGING | Age: 55
Discharge: HOME | End: 2020-01-01
Attending: INTERNAL MEDICINE
Payer: COMMERCIAL

## 2020-01-01 ENCOUNTER — PATIENT MESSAGE (OUTPATIENT)
Dept: RADIATION ONCOLOGY | Facility: HOSPITAL | Age: 55
End: 2020-01-01

## 2020-01-01 ENCOUNTER — NURSE ONLY (OUTPATIENT)
Dept: HEMATOLOGY/ONCOLOGY | Facility: HOSPITAL | Age: 55
End: 2020-01-01
Attending: INTERNAL MEDICINE
Payer: COMMERCIAL

## 2020-01-01 ENCOUNTER — OFFICE VISIT (OUTPATIENT)
Dept: NEUROLOGY | Facility: CLINIC | Age: 55
End: 2020-01-01
Payer: COMMERCIAL

## 2020-01-01 ENCOUNTER — TELEPHONE (OUTPATIENT)
Dept: HEMATOLOGY/ONCOLOGY | Facility: HOSPITAL | Age: 55
End: 2020-01-01

## 2020-01-01 ENCOUNTER — APPOINTMENT (OUTPATIENT)
Dept: HEMATOLOGY/ONCOLOGY | Age: 55
End: 2020-01-01
Attending: INTERNAL MEDICINE
Payer: COMMERCIAL

## 2020-01-01 ENCOUNTER — TELEPHONE (OUTPATIENT)
Dept: SURGERY | Facility: CLINIC | Age: 55
End: 2020-01-01

## 2020-01-01 ENCOUNTER — HOSPITAL ENCOUNTER (OUTPATIENT)
Dept: CT IMAGING | Age: 55
Discharge: HOME | End: 2020-01-01
Attending: INTERNAL MEDICINE
Payer: COMMERCIAL

## 2020-01-01 ENCOUNTER — OFFICE VISIT (OUTPATIENT)
Dept: SURGERY | Facility: CLINIC | Age: 55
End: 2020-01-01
Payer: COMMERCIAL

## 2020-01-01 ENCOUNTER — APPOINTMENT (OUTPATIENT)
Dept: RADIATION ONCOLOGY | Facility: HOSPITAL | Age: 55
End: 2020-01-01
Attending: RADIOLOGY
Payer: COMMERCIAL

## 2020-01-01 ENCOUNTER — HOSPITAL ENCOUNTER (OUTPATIENT)
Dept: MRI IMAGING | Facility: HOSPITAL | Age: 55
Discharge: HOME | End: 2020-01-01
Attending: INTERNAL MEDICINE
Payer: COMMERCIAL

## 2020-01-01 ENCOUNTER — APPOINTMENT (OUTPATIENT)
Dept: PHYSICAL THERAPY | Age: 55
End: 2020-01-01
Attending: PHYSICIAN ASSISTANT
Payer: COMMERCIAL

## 2020-01-01 ENCOUNTER — HOSPITAL ENCOUNTER (OUTPATIENT)
Dept: RADIATION ONCOLOGY | Age: 55
Discharge: HOME | End: 2020-01-01
Attending: RADIOLOGY
Payer: COMMERCIAL

## 2020-01-01 ENCOUNTER — HOSPITAL ENCOUNTER (OUTPATIENT)
Dept: MRI IMAGING | Facility: HOSPITAL | Age: 55
Discharge: HOME | End: 2020-01-01
Attending: RADIOLOGY
Payer: COMMERCIAL

## 2020-01-01 VITALS
OXYGEN SATURATION: 98 % | BODY MASS INDEX: 33.12 KG/M2 | HEIGHT: 77.01 IN | WEIGHT: 280.5 LBS | DIASTOLIC BLOOD PRESSURE: 89 MMHG | RESPIRATION RATE: 18 BRPM | TEMPERATURE: 97 F | HEART RATE: 99 BPM | SYSTOLIC BLOOD PRESSURE: 137 MMHG

## 2020-01-01 VITALS
HEART RATE: 79 BPM | RESPIRATION RATE: 16 BRPM | SYSTOLIC BLOOD PRESSURE: 123 MMHG | DIASTOLIC BLOOD PRESSURE: 81 MMHG | BODY MASS INDEX: 31 KG/M2 | OXYGEN SATURATION: 97 % | TEMPERATURE: 98 F | WEIGHT: 260.19 LBS

## 2020-01-01 VITALS
DIASTOLIC BLOOD PRESSURE: 89 MMHG | HEIGHT: 77.01 IN | RESPIRATION RATE: 18 BRPM | BODY MASS INDEX: 33.53 KG/M2 | OXYGEN SATURATION: 96 % | WEIGHT: 284 LBS | TEMPERATURE: 98 F | HEART RATE: 107 BPM | SYSTOLIC BLOOD PRESSURE: 130 MMHG

## 2020-01-01 VITALS
OXYGEN SATURATION: 98 % | RESPIRATION RATE: 16 BRPM | SYSTOLIC BLOOD PRESSURE: 126 MMHG | TEMPERATURE: 97 F | HEART RATE: 103 BPM | BODY MASS INDEX: 31 KG/M2 | WEIGHT: 264 LBS | DIASTOLIC BLOOD PRESSURE: 74 MMHG

## 2020-01-01 VITALS
RESPIRATION RATE: 18 BRPM | BODY MASS INDEX: 33 KG/M2 | OXYGEN SATURATION: 99 % | HEART RATE: 97 BPM | WEIGHT: 277 LBS | DIASTOLIC BLOOD PRESSURE: 92 MMHG | SYSTOLIC BLOOD PRESSURE: 141 MMHG | TEMPERATURE: 97 F

## 2020-01-01 VITALS
HEART RATE: 73 BPM | OXYGEN SATURATION: 95 % | SYSTOLIC BLOOD PRESSURE: 147 MMHG | TEMPERATURE: 98 F | RESPIRATION RATE: 16 BRPM | WEIGHT: 274.38 LBS | HEIGHT: 77.01 IN | BODY MASS INDEX: 32.4 KG/M2 | DIASTOLIC BLOOD PRESSURE: 88 MMHG

## 2020-01-01 VITALS
RESPIRATION RATE: 18 BRPM | TEMPERATURE: 98 F | WEIGHT: 271.5 LBS | BODY MASS INDEX: 32.06 KG/M2 | HEIGHT: 77.01 IN | HEART RATE: 93 BPM | SYSTOLIC BLOOD PRESSURE: 123 MMHG | OXYGEN SATURATION: 98 % | DIASTOLIC BLOOD PRESSURE: 79 MMHG

## 2020-01-01 VITALS
SYSTOLIC BLOOD PRESSURE: 133 MMHG | DIASTOLIC BLOOD PRESSURE: 85 MMHG | TEMPERATURE: 98 F | BODY MASS INDEX: 31.53 KG/M2 | RESPIRATION RATE: 18 BRPM | WEIGHT: 267 LBS | OXYGEN SATURATION: 98 % | HEART RATE: 78 BPM | HEIGHT: 77.01 IN

## 2020-01-01 VITALS
BODY MASS INDEX: 33 KG/M2 | DIASTOLIC BLOOD PRESSURE: 97 MMHG | OXYGEN SATURATION: 99 % | HEART RATE: 78 BPM | TEMPERATURE: 97 F | SYSTOLIC BLOOD PRESSURE: 141 MMHG | WEIGHT: 278.5 LBS | RESPIRATION RATE: 18 BRPM

## 2020-01-01 VITALS
TEMPERATURE: 97 F | HEART RATE: 78 BPM | DIASTOLIC BLOOD PRESSURE: 84 MMHG | BODY MASS INDEX: 31.29 KG/M2 | SYSTOLIC BLOOD PRESSURE: 120 MMHG | WEIGHT: 265 LBS | HEIGHT: 77 IN

## 2020-01-01 VITALS
DIASTOLIC BLOOD PRESSURE: 85 MMHG | SYSTOLIC BLOOD PRESSURE: 149 MMHG | HEART RATE: 79 BPM | RESPIRATION RATE: 18 BRPM | OXYGEN SATURATION: 98 % | TEMPERATURE: 98 F

## 2020-01-01 VITALS
HEART RATE: 77 BPM | WEIGHT: 268 LBS | OXYGEN SATURATION: 98 % | SYSTOLIC BLOOD PRESSURE: 154 MMHG | TEMPERATURE: 98 F | BODY MASS INDEX: 32 KG/M2 | DIASTOLIC BLOOD PRESSURE: 97 MMHG | RESPIRATION RATE: 20 BRPM

## 2020-01-01 VITALS
DIASTOLIC BLOOD PRESSURE: 82 MMHG | RESPIRATION RATE: 16 BRPM | WEIGHT: 260.5 LBS | TEMPERATURE: 97 F | OXYGEN SATURATION: 96 % | HEART RATE: 82 BPM | BODY MASS INDEX: 30.76 KG/M2 | HEIGHT: 77.01 IN | SYSTOLIC BLOOD PRESSURE: 121 MMHG

## 2020-01-01 VITALS
SYSTOLIC BLOOD PRESSURE: 136 MMHG | OXYGEN SATURATION: 97 % | RESPIRATION RATE: 16 BRPM | DIASTOLIC BLOOD PRESSURE: 89 MMHG | TEMPERATURE: 98 F | WEIGHT: 279 LBS | HEIGHT: 77.01 IN | HEART RATE: 87 BPM | BODY MASS INDEX: 32.94 KG/M2

## 2020-01-01 DIAGNOSIS — C79.31 BRAIN METASTASES (HCC): ICD-10-CM

## 2020-01-01 DIAGNOSIS — C78.01 SECONDARY CARCINOMA OF RIGHT LUNG (HCC): ICD-10-CM

## 2020-01-01 DIAGNOSIS — C20 RECTAL CANCER (HCC): Primary | ICD-10-CM

## 2020-01-01 DIAGNOSIS — C79.51 SECONDARY CANCER OF BONE (HCC): Primary | ICD-10-CM

## 2020-01-01 DIAGNOSIS — C78.7 SECONDARY LIVER CANCER (HCC): ICD-10-CM

## 2020-01-01 DIAGNOSIS — C78.02 SECONDARY ADENOCARCINOMA OF LEFT LUNG (HCC): ICD-10-CM

## 2020-01-01 DIAGNOSIS — C20 RECTAL CANCER (HCC): ICD-10-CM

## 2020-01-01 DIAGNOSIS — C79.31 SECONDARY CANCER OF BRAIN (HCC): ICD-10-CM

## 2020-01-01 DIAGNOSIS — C79.51 SECONDARY CANCER OF BONE (HCC): ICD-10-CM

## 2020-01-01 DIAGNOSIS — C78.7 SECONDARY LIVER CANCER (HCC): Primary | ICD-10-CM

## 2020-01-01 DIAGNOSIS — C79.31 SECONDARY CANCER OF BRAIN (HCC): Primary | ICD-10-CM

## 2020-01-01 DIAGNOSIS — C78.01 SECONDARY CARCINOMA OF RIGHT LUNG (HCC): Primary | ICD-10-CM

## 2020-01-01 DIAGNOSIS — R26.81 GAIT INSTABILITY: ICD-10-CM

## 2020-01-01 DIAGNOSIS — C79.31 BRAIN METASTASES (HCC): Primary | ICD-10-CM

## 2020-01-01 DIAGNOSIS — Z71.9 ENCOUNTER FOR HEALTH EDUCATION: ICD-10-CM

## 2020-01-01 DIAGNOSIS — C18.6 CANCER OF DESCENDING COLON (HCC): ICD-10-CM

## 2020-01-01 DIAGNOSIS — R26.81 UNSTEADINESS: ICD-10-CM

## 2020-01-01 DIAGNOSIS — C79.51 RECTAL CANCER METASTATIC TO BONE (HCC): ICD-10-CM

## 2020-01-01 DIAGNOSIS — H81.90 VESTIBULAR DIZZINESS: Primary | ICD-10-CM

## 2020-01-01 DIAGNOSIS — D64.9 ANEMIA, UNSPECIFIED TYPE: ICD-10-CM

## 2020-01-01 DIAGNOSIS — C20 RECTAL CANCER METASTATIC TO BONE (HCC): Primary | ICD-10-CM

## 2020-01-01 DIAGNOSIS — C20 RECTAL CANCER METASTATIC TO BONE (HCC): ICD-10-CM

## 2020-01-01 DIAGNOSIS — D64.9 ANEMIA, UNSPECIFIED TYPE: Primary | ICD-10-CM

## 2020-01-01 DIAGNOSIS — C79.51 RECTAL CANCER METASTATIC TO BONE (HCC): Primary | ICD-10-CM

## 2020-01-01 DIAGNOSIS — H81.90 VESTIBULAR DIZZINESS: ICD-10-CM

## 2020-01-01 LAB
ALBUMIN SERPL-MCNC: 3.1 G/DL (ref 3.4–5)
ALBUMIN SERPL-MCNC: 3.1 G/DL (ref 3.4–5)
ALBUMIN SERPL-MCNC: 3.3 G/DL (ref 3.4–5)
ALBUMIN/GLOB SERPL: 0.8 {RATIO} (ref 1–2)
ALBUMIN/GLOB SERPL: 0.9 {RATIO} (ref 1–2)
ALBUMIN/GLOB SERPL: 0.9 {RATIO} (ref 1–2)
ALP LIVER SERPL-CCNC: 129 U/L (ref 45–117)
ALP LIVER SERPL-CCNC: 149 U/L (ref 45–117)
ALP LIVER SERPL-CCNC: 152 U/L (ref 45–117)
ALT SERPL-CCNC: 20 U/L (ref 16–61)
ALT SERPL-CCNC: 30 U/L (ref 16–61)
ALT SERPL-CCNC: 36 U/L (ref 16–61)
ANION GAP SERPL CALC-SCNC: 4 MMOL/L (ref 0–18)
ANION GAP SERPL CALC-SCNC: 5 MMOL/L (ref 0–18)
ANION GAP SERPL CALC-SCNC: 7 MMOL/L (ref 0–18)
AST SERPL-CCNC: 25 U/L (ref 15–37)
AST SERPL-CCNC: 26 U/L (ref 15–37)
AST SERPL-CCNC: 42 U/L (ref 15–37)
BASOPHILS # BLD AUTO: 0.03 X10(3) UL (ref 0–0.2)
BASOPHILS # BLD AUTO: 0.04 X10(3) UL (ref 0–0.2)
BASOPHILS # BLD AUTO: 0.04 X10(3) UL (ref 0–0.2)
BASOPHILS NFR BLD AUTO: 0.3 %
BASOPHILS NFR BLD AUTO: 0.6 %
BASOPHILS NFR BLD AUTO: 0.6 %
BILIRUB SERPL-MCNC: 1 MG/DL (ref 0.1–2)
BILIRUB SERPL-MCNC: 1.2 MG/DL (ref 0.1–2)
BILIRUB SERPL-MCNC: 2 MG/DL (ref 0.1–2)
BUN BLD-MCNC: 12 MG/DL (ref 7–18)
BUN BLD-MCNC: 14 MG/DL (ref 7–18)
BUN BLD-MCNC: 17 MG/DL (ref 7–18)
BUN/CREAT SERPL: 13.2 (ref 10–20)
BUN/CREAT SERPL: 13.7 (ref 10–20)
BUN/CREAT SERPL: 8.2 (ref 10–20)
CALCIUM BLD-MCNC: 8.2 MG/DL (ref 8.5–10.1)
CALCIUM BLD-MCNC: 8.5 MG/DL (ref 8.5–10.1)
CALCIUM BLD-MCNC: 8.6 MG/DL (ref 8.5–10.1)
CEA SERPL-MCNC: 4029.1 NG/ML (ref ?–5)
CEA SERPL-MCNC: 4679.4 NG/ML (ref ?–5)
CEA SERPL-MCNC: 4803 NG/ML (ref ?–5)
CEA SERPL-MCNC: 7149.9 NG/ML (ref ?–5)
CHLORIDE SERPL-SCNC: 106 MMOL/L (ref 98–112)
CHLORIDE SERPL-SCNC: 107 MMOL/L (ref 98–112)
CHLORIDE SERPL-SCNC: 107 MMOL/L (ref 98–112)
CO2 SERPL-SCNC: 27 MMOL/L (ref 21–32)
CO2 SERPL-SCNC: 27 MMOL/L (ref 21–32)
CO2 SERPL-SCNC: 28 MMOL/L (ref 21–32)
CREAT BLD-MCNC: 1.06 MG/DL (ref 0.7–1.3)
CREAT BLD-MCNC: 1.24 MG/DL (ref 0.7–1.3)
CREAT BLD-MCNC: 1.46 MG/DL (ref 0.7–1.3)
DEPRECATED HBV CORE AB SER IA-ACNC: 278.3 NG/ML (ref 30–530)
DEPRECATED RDW RBC AUTO: 66.6 FL (ref 35.1–46.3)
DEPRECATED RDW RBC AUTO: 75.3 FL (ref 35.1–46.3)
DEPRECATED RDW RBC AUTO: 85.1 FL (ref 35.1–46.3)
EOSINOPHIL # BLD AUTO: 0.09 X10(3) UL (ref 0–0.7)
EOSINOPHIL # BLD AUTO: 0.12 X10(3) UL (ref 0–0.7)
EOSINOPHIL # BLD AUTO: 0.16 X10(3) UL (ref 0–0.7)
EOSINOPHIL NFR BLD AUTO: 0.9 %
EOSINOPHIL NFR BLD AUTO: 1.9 %
EOSINOPHIL NFR BLD AUTO: 2.5 %
ERYTHROCYTE [DISTWIDTH] IN BLOOD BY AUTOMATED COUNT: 16.9 % (ref 11–15)
ERYTHROCYTE [DISTWIDTH] IN BLOOD BY AUTOMATED COUNT: 19.6 % (ref 11–15)
ERYTHROCYTE [DISTWIDTH] IN BLOOD BY AUTOMATED COUNT: 22.8 % (ref 11–15)
GLOBULIN PLAS-MCNC: 3.5 G/DL (ref 2.8–4.4)
GLOBULIN PLAS-MCNC: 3.6 G/DL (ref 2.8–4.4)
GLOBULIN PLAS-MCNC: 3.7 G/DL (ref 2.8–4.4)
GLUCOSE BLD-MCNC: 119 MG/DL (ref 70–99)
GLUCOSE BLD-MCNC: 156 MG/DL (ref 70–99)
GLUCOSE BLD-MCNC: 174 MG/DL (ref 70–99)
HCT VFR BLD AUTO: 33.7 % (ref 39–53)
HCT VFR BLD AUTO: 34.9 % (ref 39–53)
HCT VFR BLD AUTO: 36 % (ref 39–53)
HGB BLD-MCNC: 11.3 G/DL (ref 13–17.5)
HGB BLD-MCNC: 11.6 G/DL (ref 13–17.5)
HGB BLD-MCNC: 12 G/DL (ref 13–17.5)
IMM GRANULOCYTES # BLD AUTO: 0.04 X10(3) UL (ref 0–1)
IMM GRANULOCYTES # BLD AUTO: 0.05 X10(3) UL (ref 0–1)
IMM GRANULOCYTES # BLD AUTO: 0.06 X10(3) UL (ref 0–1)
IMM GRANULOCYTES NFR BLD: 0.5 %
IMM GRANULOCYTES NFR BLD: 0.6 %
IMM GRANULOCYTES NFR BLD: 1 %
IRON SATURATION: 27 % (ref 20–50)
IRON SERPL-MCNC: 107 UG/DL (ref 65–175)
LYMPHOCYTES # BLD AUTO: 0.94 X10(3) UL (ref 1–4)
LYMPHOCYTES # BLD AUTO: 1.01 X10(3) UL (ref 1–4)
LYMPHOCYTES # BLD AUTO: 1.07 X10(3) UL (ref 1–4)
LYMPHOCYTES NFR BLD AUTO: 10.5 %
LYMPHOCYTES NFR BLD AUTO: 15 %
LYMPHOCYTES NFR BLD AUTO: 17 %
M PROTEIN MFR SERPL ELPH: 6.7 G/DL (ref 6.4–8.2)
M PROTEIN MFR SERPL ELPH: 6.8 G/DL (ref 6.4–8.2)
M PROTEIN MFR SERPL ELPH: 6.8 G/DL (ref 6.4–8.2)
MCH RBC QN AUTO: 34 PG (ref 26–34)
MCH RBC QN AUTO: 34.4 PG (ref 26–34)
MCH RBC QN AUTO: 36 PG (ref 26–34)
MCHC RBC AUTO-ENTMCNC: 33.2 G/DL (ref 31–37)
MCHC RBC AUTO-ENTMCNC: 33.3 G/DL (ref 31–37)
MCHC RBC AUTO-ENTMCNC: 33.5 G/DL (ref 31–37)
MCV RBC AUTO: 102 FL (ref 80–100)
MCV RBC AUTO: 103.6 FL (ref 80–100)
MCV RBC AUTO: 107.3 FL (ref 80–100)
MONOCYTES # BLD AUTO: 0.45 X10(3) UL (ref 0.1–1)
MONOCYTES # BLD AUTO: 0.57 X10(3) UL (ref 0.1–1)
MONOCYTES # BLD AUTO: 0.72 X10(3) UL (ref 0.1–1)
MONOCYTES NFR BLD AUTO: 7.2 %
MONOCYTES NFR BLD AUTO: 7.5 %
MONOCYTES NFR BLD AUTO: 9.1 %
NEUTROPHILS # BLD AUTO: 4.53 X10 (3) UL (ref 1.5–7.7)
NEUTROPHILS # BLD AUTO: 4.53 X10(3) UL (ref 1.5–7.7)
NEUTROPHILS # BLD AUTO: 4.54 X10 (3) UL (ref 1.5–7.7)
NEUTROPHILS # BLD AUTO: 4.54 X10(3) UL (ref 1.5–7.7)
NEUTROPHILS # BLD AUTO: 7.72 X10 (3) UL (ref 1.5–7.7)
NEUTROPHILS # BLD AUTO: 7.72 X10(3) UL (ref 1.5–7.7)
NEUTROPHILS NFR BLD AUTO: 72.1 %
NEUTROPHILS NFR BLD AUTO: 72.4 %
NEUTROPHILS NFR BLD AUTO: 80.3 %
OSMOLALITY SERPL CALC.SUM OF ELEC: 287 MOSM/KG (ref 275–295)
OSMOLALITY SERPL CALC.SUM OF ELEC: 295 MOSM/KG (ref 275–295)
OSMOLALITY SERPL CALC.SUM OF ELEC: 295 MOSM/KG (ref 275–295)
PATIENT FASTING Y/N/NP: NO
PATIENT FASTING Y/N/NP: NO
PLATELET # BLD AUTO: 135 10(3)UL (ref 150–450)
PLATELET # BLD AUTO: 196 10(3)UL (ref 150–450)
PLATELET # BLD AUTO: 204 10(3)UL (ref 150–450)
PLATELET MORPHOLOGY: NORMAL
POTASSIUM SERPL-SCNC: 3.6 MMOL/L (ref 3.5–5.1)
POTASSIUM SERPL-SCNC: 3.7 MMOL/L (ref 3.5–5.1)
POTASSIUM SERPL-SCNC: 3.7 MMOL/L (ref 3.5–5.1)
RBC # BLD AUTO: 3.14 X10(6)UL (ref 4.3–5.7)
RBC # BLD AUTO: 3.37 X10(6)UL (ref 4.3–5.7)
RBC # BLD AUTO: 3.53 X10(6)UL (ref 4.3–5.7)
SODIUM SERPL-SCNC: 137 MMOL/L (ref 136–145)
SODIUM SERPL-SCNC: 140 MMOL/L (ref 136–145)
SODIUM SERPL-SCNC: 141 MMOL/L (ref 136–145)
TOTAL IRON BINDING CAPACITY: 390 UG/DL (ref 240–450)
TRANSFERRIN SERPL-MCNC: 262 MG/DL (ref 200–360)
WBC # BLD AUTO: 6.3 X10(3) UL (ref 4–11)
WBC # BLD AUTO: 6.3 X10(3) UL (ref 4–11)
WBC # BLD AUTO: 9.6 X10(3) UL (ref 4–11)

## 2020-01-01 PROCEDURE — 83540 ASSAY OF IRON: CPT

## 2020-01-01 PROCEDURE — 97112 NEUROMUSCULAR REEDUCATION: CPT

## 2020-01-01 PROCEDURE — 82378 CARCINOEMBRYONIC ANTIGEN: CPT

## 2020-01-01 PROCEDURE — 80053 COMPREHEN METABOLIC PANEL: CPT

## 2020-01-01 PROCEDURE — 99211 OFF/OP EST MAY X REQ PHY/QHP: CPT

## 2020-01-01 PROCEDURE — 74177 CT ABD & PELVIS W/CONTRAST: CPT | Performed by: INTERNAL MEDICINE

## 2020-01-01 PROCEDURE — 77399 UNLISTED PX MED RADJ PHYSICS: CPT | Performed by: RADIOLOGY

## 2020-01-01 PROCEDURE — 77334 RADIATION TREATMENT AID(S): CPT | Performed by: RADIOLOGY

## 2020-01-01 PROCEDURE — 96413 CHEMO IV INFUSION 1 HR: CPT

## 2020-01-01 PROCEDURE — 99214 OFFICE O/P EST MOD 30 MIN: CPT | Performed by: INTERNAL MEDICINE

## 2020-01-01 PROCEDURE — 85025 COMPLETE CBC W/AUTO DIFF WBC: CPT

## 2020-01-01 PROCEDURE — 36591 DRAW BLOOD OFF VENOUS DEVICE: CPT

## 2020-01-01 PROCEDURE — 77300 RADIATION THERAPY DOSE PLAN: CPT | Performed by: RADIOLOGY

## 2020-01-01 PROCEDURE — 97530 THERAPEUTIC ACTIVITIES: CPT

## 2020-01-01 PROCEDURE — 97110 THERAPEUTIC EXERCISES: CPT

## 2020-01-01 PROCEDURE — A9575 INJ GADOTERATE MEGLUMI 0.1ML: HCPCS | Performed by: INTERNAL MEDICINE

## 2020-01-01 PROCEDURE — 96375 TX/PRO/DX INJ NEW DRUG ADDON: CPT

## 2020-01-01 PROCEDURE — 70553 MRI BRAIN STEM W/O & W/DYE: CPT | Performed by: PHYSICIAN ASSISTANT

## 2020-01-01 PROCEDURE — A9575 INJ GADOTERATE MEGLUMI 0.1ML: HCPCS | Performed by: RADIOLOGY

## 2020-01-01 PROCEDURE — 93306 TTE W/DOPPLER COMPLETE: CPT | Performed by: INTERNAL MEDICINE

## 2020-01-01 PROCEDURE — 77373 STRTCTC BDY RAD THER TX DLVR: CPT | Performed by: RADIOLOGY

## 2020-01-01 PROCEDURE — 99213 OFFICE O/P EST LOW 20 MIN: CPT | Performed by: CLINICAL NURSE SPECIALIST

## 2020-01-01 PROCEDURE — 99213 OFFICE O/P EST LOW 20 MIN: CPT

## 2020-01-01 PROCEDURE — 99213 OFFICE O/P EST LOW 20 MIN: CPT | Performed by: PHYSICIAN ASSISTANT

## 2020-01-01 PROCEDURE — 70553 MRI BRAIN STEM W/O & W/DYE: CPT | Performed by: INTERNAL MEDICINE

## 2020-01-01 PROCEDURE — 70553 MRI BRAIN STEM W/O & W/DYE: CPT | Performed by: RADIOLOGY

## 2020-01-01 PROCEDURE — 99212 OFFICE O/P EST SF 10 MIN: CPT | Performed by: NEUROLOGICAL SURGERY

## 2020-01-01 PROCEDURE — 71260 CT THORAX DX C+: CPT | Performed by: INTERNAL MEDICINE

## 2020-01-01 PROCEDURE — 3074F SYST BP LT 130 MM HG: CPT | Performed by: COLON & RECTAL SURGERY

## 2020-01-01 PROCEDURE — 77338 DESIGN MLC DEVICE FOR IMRT: CPT | Performed by: RADIOLOGY

## 2020-01-01 PROCEDURE — 97163 PT EVAL HIGH COMPLEX 45 MIN: CPT

## 2020-01-01 PROCEDURE — 3079F DIAST BP 80-89 MM HG: CPT | Performed by: COLON & RECTAL SURGERY

## 2020-01-01 PROCEDURE — 99213 OFFICE O/P EST LOW 20 MIN: CPT | Performed by: COLON & RECTAL SURGERY

## 2020-01-01 PROCEDURE — 77301 RADIOTHERAPY DOSE PLAN IMRT: CPT | Performed by: RADIOLOGY

## 2020-01-01 PROCEDURE — 77470 SPECIAL RADIATION TREATMENT: CPT | Performed by: RADIOLOGY

## 2020-01-01 PROCEDURE — 77290 THER RAD SIMULAJ FIELD CPLX: CPT | Performed by: RADIOLOGY

## 2020-01-01 PROCEDURE — A9575 INJ GADOTERATE MEGLUMI 0.1ML: HCPCS | Performed by: PHYSICIAN ASSISTANT

## 2020-01-01 PROCEDURE — 36415 COLL VENOUS BLD VENIPUNCTURE: CPT

## 2020-01-01 PROCEDURE — 3008F BODY MASS INDEX DOCD: CPT | Performed by: COLON & RECTAL SURGERY

## 2020-01-01 PROCEDURE — 83550 IRON BINDING TEST: CPT

## 2020-01-01 PROCEDURE — 82728 ASSAY OF FERRITIN: CPT

## 2020-01-01 RX ORDER — SODIUM CHLORIDE 0.9 % (FLUSH) 0.9 %
10 SYRINGE (ML) INJECTION ONCE
Status: COMPLETED | OUTPATIENT
Start: 2020-01-01 | End: 2020-01-01

## 2020-01-01 RX ORDER — DEXAMETHASONE 1 MG
TABLET ORAL
Qty: 30 TABLET | Refills: 1 | Status: SHIPPED | OUTPATIENT
Start: 2020-01-01 | End: 2020-01-01

## 2020-01-01 RX ORDER — DEXAMETHASONE 2 MG/1
1 TABLET ORAL
COMMUNITY
Start: 2020-01-01 | End: 2021-01-01

## 2020-01-01 RX ORDER — CAPECITABINE 500 MG/1
2000 TABLET, FILM COATED ORAL 2 TIMES DAILY
Qty: 112 TABLET | Refills: 5 | Status: SHIPPED | OUTPATIENT
Start: 2020-01-01 | End: 2020-01-01

## 2020-01-01 RX ORDER — DEXAMETHASONE 1 MG
TABLET ORAL
Qty: 50 TABLET | Refills: 1 | OUTPATIENT
Start: 2020-01-01

## 2020-01-01 RX ORDER — CAPECITABINE 500 MG/1
TABLET, FILM COATED ORAL
Qty: 140 TABLET | Refills: 5 | Status: SHIPPED | OUTPATIENT
Start: 2020-01-01 | End: 2020-01-01

## 2020-01-01 RX ORDER — DEXAMETHASONE 1 MG
TABLET ORAL
Qty: 50 TABLET | Refills: 1 | Status: SHIPPED | OUTPATIENT
Start: 2020-01-01 | End: 2020-01-01

## 2020-01-01 RX ORDER — CAPECITABINE 500 MG/1
2000 TABLET, FILM COATED ORAL 2 TIMES DAILY
Qty: 140 TABLET | Refills: 4 | Status: SHIPPED | OUTPATIENT
Start: 2020-01-01 | End: 2020-01-01

## 2020-01-01 RX ORDER — DEXAMETHASONE 2 MG/1
2 TABLET ORAL
Qty: 30 TABLET | Refills: 0 | Status: SHIPPED | OUTPATIENT
Start: 2020-01-01 | End: 2020-01-01

## 2020-01-01 RX ORDER — CEPHALEXIN 500 MG/1
500 CAPSULE ORAL 4 TIMES DAILY
Qty: 40 CAPSULE | Refills: 0 | Status: SHIPPED | OUTPATIENT
Start: 2020-01-01 | End: 2020-01-01 | Stop reason: ALTCHOICE

## 2020-01-01 RX ADMIN — SODIUM CHLORIDE 0.9 % (FLUSH) 10 ML: 0.9 % SYRINGE (ML) INJECTION at 10:05:00

## 2020-01-02 ENCOUNTER — DOCUMENTATION ONLY (OUTPATIENT)
Dept: RADIATION ONCOLOGY | Facility: HOSPITAL | Age: 55
End: 2020-01-02

## 2020-01-02 PROCEDURE — 77412 RADIATION TX DELIVERY LVL 3: CPT | Performed by: RADIOLOGY

## 2020-01-02 PROCEDURE — 77387 GUIDANCE FOR RADJ TX DLVR: CPT | Performed by: RADIOLOGY

## 2020-01-03 PROCEDURE — 77336 RADIATION PHYSICS CONSULT: CPT | Performed by: RADIOLOGY

## 2020-01-09 ENCOUNTER — OFFICE VISIT (OUTPATIENT)
Dept: HEMATOLOGY/ONCOLOGY | Age: 55
End: 2020-01-09
Attending: INTERNAL MEDICINE
Payer: COMMERCIAL

## 2020-01-09 VITALS
SYSTOLIC BLOOD PRESSURE: 157 MMHG | TEMPERATURE: 98 F | HEIGHT: 77.01 IN | DIASTOLIC BLOOD PRESSURE: 89 MMHG | HEART RATE: 79 BPM | RESPIRATION RATE: 18 BRPM | BODY MASS INDEX: 30.46 KG/M2 | WEIGHT: 258 LBS | OXYGEN SATURATION: 97 %

## 2020-01-09 DIAGNOSIS — C79.51 SECONDARY CANCER OF BONE (HCC): ICD-10-CM

## 2020-01-09 DIAGNOSIS — C20 RECTAL CANCER (HCC): ICD-10-CM

## 2020-01-09 DIAGNOSIS — C78.01 SECONDARY CARCINOMA OF RIGHT LUNG (HCC): ICD-10-CM

## 2020-01-09 DIAGNOSIS — Z51.11 ENCOUNTER FOR CHEMOTHERAPY MANAGEMENT: ICD-10-CM

## 2020-01-09 DIAGNOSIS — T45.1X5A NEUROPATHY DUE TO CHEMOTHERAPEUTIC DRUG (HCC): ICD-10-CM

## 2020-01-09 DIAGNOSIS — C78.7 SECONDARY LIVER CANCER (HCC): Primary | ICD-10-CM

## 2020-01-09 DIAGNOSIS — C78.02 SECONDARY ADENOCARCINOMA OF LEFT LUNG (HCC): ICD-10-CM

## 2020-01-09 DIAGNOSIS — I82.409 ACUTE DEEP VEIN THROMBOSIS (DVT) OF LOWER EXTREMITY, UNSPECIFIED LATERALITY, UNSPECIFIED VEIN (HCC): ICD-10-CM

## 2020-01-09 DIAGNOSIS — G62.0 NEUROPATHY DUE TO CHEMOTHERAPEUTIC DRUG (HCC): ICD-10-CM

## 2020-01-09 DIAGNOSIS — I82.432 ACUTE DEEP VEIN THROMBOSIS (DVT) OF POPLITEAL VEIN OF LEFT LOWER EXTREMITY (HCC): ICD-10-CM

## 2020-01-09 DIAGNOSIS — C18.6 CANCER OF DESCENDING COLON (HCC): ICD-10-CM

## 2020-01-09 LAB
ALBUMIN SERPL-MCNC: 3.2 G/DL (ref 3.4–5)
ALBUMIN/GLOB SERPL: 0.8 {RATIO} (ref 1–2)
ALP LIVER SERPL-CCNC: 74 U/L (ref 45–117)
ALT SERPL-CCNC: 27 U/L (ref 16–61)
ANION GAP SERPL CALC-SCNC: 7 MMOL/L (ref 0–18)
AST SERPL-CCNC: 23 U/L (ref 15–37)
BASOPHILS # BLD AUTO: 0.03 X10(3) UL (ref 0–0.2)
BASOPHILS NFR BLD AUTO: 0.4 %
BILIRUB SERPL-MCNC: 0.4 MG/DL (ref 0.1–2)
BUN BLD-MCNC: 12 MG/DL (ref 7–18)
BUN/CREAT SERPL: 11.5 (ref 10–20)
CALCIUM BLD-MCNC: 7.4 MG/DL (ref 8.5–10.1)
CEA SERPL-MCNC: 818.5 NG/ML (ref ?–5)
CHLORIDE SERPL-SCNC: 108 MMOL/L (ref 98–112)
CO2 SERPL-SCNC: 26 MMOL/L (ref 21–32)
CREAT BLD-MCNC: 1.04 MG/DL (ref 0.7–1.3)
DEPRECATED RDW RBC AUTO: 46.6 FL (ref 35.1–46.3)
EOSINOPHIL # BLD AUTO: 0.31 X10(3) UL (ref 0–0.7)
EOSINOPHIL NFR BLD AUTO: 4.5 %
ERYTHROCYTE [DISTWIDTH] IN BLOOD BY AUTOMATED COUNT: 13.2 % (ref 11–15)
GLOBULIN PLAS-MCNC: 4.1 G/DL (ref 2.8–4.4)
GLUCOSE BLD-MCNC: 121 MG/DL (ref 70–99)
HCT VFR BLD AUTO: 38.8 % (ref 39–53)
HGB BLD-MCNC: 12.3 G/DL (ref 13–17.5)
IMM GRANULOCYTES # BLD AUTO: 0.01 X10(3) UL (ref 0–1)
IMM GRANULOCYTES NFR BLD: 0.1 %
LYMPHOCYTES # BLD AUTO: 1.11 X10(3) UL (ref 1–4)
LYMPHOCYTES NFR BLD AUTO: 16 %
M PROTEIN MFR SERPL ELPH: 7.3 G/DL (ref 6.4–8.2)
MCH RBC QN AUTO: 30.2 PG (ref 26–34)
MCHC RBC AUTO-ENTMCNC: 31.7 G/DL (ref 31–37)
MCV RBC AUTO: 95.3 FL (ref 80–100)
MONOCYTES # BLD AUTO: 0.41 X10(3) UL (ref 0.1–1)
MONOCYTES NFR BLD AUTO: 5.9 %
NEUTROPHILS # BLD AUTO: 5.07 X10 (3) UL (ref 1.5–7.7)
NEUTROPHILS # BLD AUTO: 5.07 X10(3) UL (ref 1.5–7.7)
NEUTROPHILS NFR BLD AUTO: 73.1 %
OSMOLALITY SERPL CALC.SUM OF ELEC: 293 MOSM/KG (ref 275–295)
PATIENT FASTING Y/N/NP: NO
PLATELET # BLD AUTO: 189 10(3)UL (ref 150–450)
POTASSIUM SERPL-SCNC: 3.7 MMOL/L (ref 3.5–5.1)
RBC # BLD AUTO: 4.07 X10(6)UL (ref 4.3–5.7)
SODIUM SERPL-SCNC: 141 MMOL/L (ref 136–145)
WBC # BLD AUTO: 6.9 X10(3) UL (ref 4–11)

## 2020-01-09 PROCEDURE — 96417 CHEMO IV INFUS EACH ADDL SEQ: CPT

## 2020-01-09 PROCEDURE — 99214 OFFICE O/P EST MOD 30 MIN: CPT | Performed by: CLINICAL NURSE SPECIALIST

## 2020-01-09 PROCEDURE — 80053 COMPREHEN METABOLIC PANEL: CPT

## 2020-01-09 PROCEDURE — 96413 CHEMO IV INFUSION 1 HR: CPT

## 2020-01-09 PROCEDURE — 82378 CARCINOEMBRYONIC ANTIGEN: CPT

## 2020-01-09 PROCEDURE — 85025 COMPLETE CBC W/AUTO DIFF WBC: CPT

## 2020-01-09 NOTE — PROGRESS NOTES
Pt here for C31D1.   Arrives Ambulating independently, accompanied by Self           Patient reports possible pregnancy since last therapy cycle: Not Applicable    Modifications in dose or schedule: No     Frequency of blood return and site check throughout

## 2020-01-09 NOTE — PROGRESS NOTES
ANP Visit Note    Patient Name: Gabriela Blair   YOB: 1965   Medical Record Number: [id number]   CSN: [id number]   Date of visit: 1/9/2020   Mario Roman MD   No primary care provider on file.      Chief Complaint/Reason for Visit:  Patient adenomatous polyp     Adenomatous polyp of colon     Secondary liver cancer ([facility] [address]     Secondary carcinoma of right lung ([facility] [address]     Secondary adenocarcinoma of left lung (HCC)     Anemia, unspecified     Acute deep vein thrombosis (DVT) of popliteal vein of Sexual Activity      Alcohol use: Yes        Frequency: Never        Comment: 1 beer per day      Drug use: No      Sexual activity: Yes        Partners: Female    Lifestyle      Physical activity:        Days per week: Not on file        Minutes per sessi in the the HPI. Performance Status: ECOG 1    Physical Examination:  General: Patient is alert and oriented x 3, not in acute distress. Vital Signs: Oncology Vitals 1/9/2020   Height 6' 5.008\"   Height 196 cm   Weight 258 lb   Weight 117. 028 kg   BS 15.0 %    RDW-SD 46.6 (H) 35.1 - 46.3 fL    Neutrophil Absolute Prelim 5.07 1.50 - 7.70 x10 (3) uL    Neutrophil Absolute 5.07 1.50 - 7.70 x10(3) uL    Lymphocyte Absolute 1.11 1.00 - 4.00 x10(3) uL    Monocyte Absolute 0.41 0.10 - 1.00 x10(3) uL    Eosino

## 2020-01-10 DIAGNOSIS — C18.9 COLON CANCER METASTASIZED TO LIVER (HCC): Primary | ICD-10-CM

## 2020-01-10 DIAGNOSIS — C78.7 COLON CANCER METASTASIZED TO LIVER (HCC): Primary | ICD-10-CM

## 2020-01-17 NOTE — PROGRESS NOTES
[facility]IATION [facility]COL[facility]  TREATMENT SUMMARY     PATIENT:  Tiana Fallon MD: Nicole Sanchez MD  DIAGNOSIS:  Metastatic colon cancer    HISTORY   22-year-old man with metastatic colon cancer and a lytic lesion in the left pubic

## 2020-01-22 ENCOUNTER — HOSPITAL ENCOUNTER (OUTPATIENT)
Dept: CV DIAGNOSTICS | Facility: HOSPITAL | Age: 55
Discharge: HOME | End: 2020-01-22
Attending: CLINICAL NURSE SPECIALIST
Payer: COMMERCIAL

## 2020-01-22 DIAGNOSIS — C78.7 SECONDARY LIVER CANCER (HCC): ICD-10-CM

## 2020-01-22 DIAGNOSIS — C20 RECTAL CANCER (HCC): ICD-10-CM

## 2020-01-22 DIAGNOSIS — C78.02 SECONDARY ADENOCARCINOMA OF LEFT LUNG (HCC): ICD-10-CM

## 2020-01-22 DIAGNOSIS — C78.01 SECONDARY CARCINOMA OF RIGHT LUNG (HCC): ICD-10-CM

## 2020-01-22 PROCEDURE — 93306 TTE W/DOPPLER COMPLETE: CPT | Performed by: CLINICAL NURSE SPECIALIST

## 2020-01-27 ENCOUNTER — HOSPITAL ENCOUNTER (OUTPATIENT)
Dept: CT IMAGING | Age: 55
Discharge: HOME | End: 2020-01-27
Attending: INTERNAL MEDICINE
Payer: COMMERCIAL

## 2020-01-27 DIAGNOSIS — C18.9 COLON CANCER METASTASIZED TO LIVER (HCC): ICD-10-CM

## 2020-01-27 DIAGNOSIS — C78.7 COLON CANCER METASTASIZED TO LIVER (HCC): ICD-10-CM

## 2020-01-27 PROCEDURE — 74177 CT ABD & PELVIS W/CONTRAST: CPT | Performed by: INTERNAL MEDICINE

## 2020-01-27 PROCEDURE — 71260 CT THORAX DX C+: CPT | Performed by: INTERNAL MEDICINE

## 2020-01-28 RX ORDER — RIVAROXABAN 20 MG/1
TABLET, FILM COATED ORAL
Qty: 90 TABLET | Refills: 3 | Status: SHIPPED | OUTPATIENT
Start: 2020-01-28 | End: 2021-01-01

## 2020-01-30 ENCOUNTER — OFFICE VISIT (OUTPATIENT)
Dept: HEMATOLOGY/ONCOLOGY | Age: 55
End: 2020-01-30
Attending: INTERNAL MEDICINE
Payer: COMMERCIAL

## 2020-01-30 VITALS
SYSTOLIC BLOOD PRESSURE: 115 MMHG | HEART RATE: 79 BPM | DIASTOLIC BLOOD PRESSURE: 76 MMHG | RESPIRATION RATE: 16 BRPM | OXYGEN SATURATION: 99 % | TEMPERATURE: 98 F | BODY MASS INDEX: 30 KG/M2 | WEIGHT: 251.5 LBS

## 2020-01-30 DIAGNOSIS — C20 RECTAL CANCER (HCC): ICD-10-CM

## 2020-01-30 DIAGNOSIS — C78.01 SECONDARY CARCINOMA OF RIGHT LUNG (HCC): ICD-10-CM

## 2020-01-30 DIAGNOSIS — C78.7 SECONDARY LIVER CANCER (HCC): ICD-10-CM

## 2020-01-30 DIAGNOSIS — C78.7 SECONDARY LIVER CANCER (HCC): Primary | ICD-10-CM

## 2020-01-30 DIAGNOSIS — C20 RECTAL CANCER (HCC): Primary | ICD-10-CM

## 2020-01-30 DIAGNOSIS — C78.02 SECONDARY ADENOCARCINOMA OF LEFT LUNG (HCC): ICD-10-CM

## 2020-01-30 LAB
ALBUMIN SERPL-MCNC: 3.6 G/DL (ref 3.4–5)
ALBUMIN/GLOB SERPL: 0.9 {RATIO} (ref 1–2)
ALP LIVER SERPL-CCNC: 103 U/L (ref 45–117)
ALT SERPL-CCNC: 44 U/L (ref 16–61)
ANION GAP SERPL CALC-SCNC: 6 MMOL/L (ref 0–18)
AST SERPL-CCNC: 33 U/L (ref 15–37)
BASOPHILS # BLD AUTO: 0.03 X10(3) UL (ref 0–0.2)
BASOPHILS NFR BLD AUTO: 0.5 %
BILIRUB SERPL-MCNC: 0.5 MG/DL (ref 0.1–2)
BUN BLD-MCNC: 12 MG/DL (ref 7–18)
BUN/CREAT SERPL: 10.1 (ref 10–20)
CALCIUM BLD-MCNC: 8.4 MG/DL (ref 8.5–10.1)
CEA SERPL-MCNC: 1564.9 NG/ML (ref ?–5)
CHLORIDE SERPL-SCNC: 107 MMOL/L (ref 98–112)
CO2 SERPL-SCNC: 27 MMOL/L (ref 21–32)
CREAT BLD-MCNC: 1.19 MG/DL (ref 0.7–1.3)
DEPRECATED RDW RBC AUTO: 45.2 FL (ref 35.1–46.3)
EOSINOPHIL # BLD AUTO: 0.25 X10(3) UL (ref 0–0.7)
EOSINOPHIL NFR BLD AUTO: 4.1 %
ERYTHROCYTE [DISTWIDTH] IN BLOOD BY AUTOMATED COUNT: 13.2 % (ref 11–15)
GLOBULIN PLAS-MCNC: 4 G/DL (ref 2.8–4.4)
GLUCOSE BLD-MCNC: 119 MG/DL (ref 70–99)
HCT VFR BLD AUTO: 42.3 % (ref 39–53)
HGB BLD-MCNC: 13.4 G/DL (ref 13–17.5)
IMM GRANULOCYTES # BLD AUTO: 0.02 X10(3) UL (ref 0–1)
IMM GRANULOCYTES NFR BLD: 0.3 %
LYMPHOCYTES # BLD AUTO: 1.07 X10(3) UL (ref 1–4)
LYMPHOCYTES NFR BLD AUTO: 17.6 %
M PROTEIN MFR SERPL ELPH: 7.6 G/DL (ref 6.4–8.2)
MCH RBC QN AUTO: 29.8 PG (ref 26–34)
MCHC RBC AUTO-ENTMCNC: 31.7 G/DL (ref 31–37)
MCV RBC AUTO: 94 FL (ref 80–100)
MONOCYTES # BLD AUTO: 0.42 X10(3) UL (ref 0.1–1)
MONOCYTES NFR BLD AUTO: 6.9 %
NEUTROPHILS # BLD AUTO: 4.28 X10 (3) UL (ref 1.5–7.7)
NEUTROPHILS # BLD AUTO: 4.28 X10(3) UL (ref 1.5–7.7)
NEUTROPHILS NFR BLD AUTO: 70.6 %
OSMOLALITY SERPL CALC.SUM OF ELEC: 291 MOSM/KG (ref 275–295)
PATIENT FASTING Y/N/NP: NO
PLATELET # BLD AUTO: 170 10(3)UL (ref 150–450)
POTASSIUM SERPL-SCNC: 3.8 MMOL/L (ref 3.5–5.1)
RBC # BLD AUTO: 4.5 X10(6)UL (ref 4.3–5.7)
SODIUM SERPL-SCNC: 140 MMOL/L (ref 136–145)
WBC # BLD AUTO: 6.1 X10(3) UL (ref 4–11)

## 2020-01-30 PROCEDURE — 96413 CHEMO IV INFUSION 1 HR: CPT

## 2020-01-30 PROCEDURE — 82378 CARCINOEMBRYONIC ANTIGEN: CPT

## 2020-01-30 PROCEDURE — 99214 OFFICE O/P EST MOD 30 MIN: CPT | Performed by: INTERNAL MEDICINE

## 2020-01-30 PROCEDURE — 80053 COMPREHEN METABOLIC PANEL: CPT

## 2020-01-30 PROCEDURE — 85025 COMPLETE CBC W/AUTO DIFF WBC: CPT

## 2020-01-30 PROCEDURE — 96417 CHEMO IV INFUS EACH ADDL SEQ: CPT

## 2020-01-30 NOTE — PROGRESS NOTES
Patient is here for MD f/u and cycle 4 of Herceptin and Perjeta. He is tolerating well. Mild rash on his face but overall ok. Appetite and energy level is good. Patient had a ct scan on 1/27. Here to discuss results.        Education Record    Learner:  James Cheng

## 2020-02-03 ENCOUNTER — TELEPHONE (OUTPATIENT)
Dept: HEMATOLOGY/ONCOLOGY | Facility: HOSPITAL | Age: 55
End: 2020-02-03

## 2020-02-03 NOTE — TELEPHONE ENCOUNTER
Called pt to coordinate Tempus testing. Pt agreed to go to [facility] on Wed 2/5/2020 for paired lab draw for Tempus. He okayed peripheral lab draw for testing.

## 2020-02-04 ENCOUNTER — TELEPHONE (OUTPATIENT)
Dept: HEMATOLOGY/ONCOLOGY | Facility: HOSPITAL | Age: 55
End: 2020-02-04

## 2020-02-04 NOTE — PROGRESS NOTES
[address]    PATIENT'S NAME: Cammie Vega   ATTENDING PHYSICIAN: Anabell Perez M.D.    PATIENT ACCOUNT #: [de-identified] LOCATION: [address] [place] [address] RECORD #: [id number] YOB: 1965   DATE OF SERVICE: 01/30/2020       CANCER ROSA edema.    LABORATORY DATA:  CBC is normal.  His liver enzymes and chemistries are completely normal.  His CEA, however, has gone up substantially. His CT shows slight progression of his disease in pulmonary nodules and in his liver metastases.   The tota

## 2020-02-04 NOTE — TELEPHONE ENCOUNTER
Gregory called to get the 2 most recent office notes as well as a chemo flow sheet faxed to them. It can be faxed to [phone] and a secondary fax number is [phone].
Medical records faxed to [facility] as requested to [phone] and [phone].
ambulatory

## 2020-02-05 ENCOUNTER — NURSE NAVIGATOR ENCOUNTER (OUTPATIENT)
Dept: HEMATOLOGY/ONCOLOGY | Facility: HOSPITAL | Age: 55
End: 2020-02-05

## 2020-02-05 ENCOUNTER — APPOINTMENT (OUTPATIENT)
Dept: HEMATOLOGY/ONCOLOGY | Age: 55
End: 2020-02-05
Attending: INTERNAL MEDICINE
Payer: COMMERCIAL

## 2020-02-05 PROCEDURE — 36415 COLL VENOUS BLD VENIPUNCTURE: CPT

## 2020-02-05 NOTE — PROGRESS NOTES
Next generation genomic sequencing order sent in online to [facility] with paired blood sample sent via [employer] from [facility]. Specimen [id number] collected 10/12/2018 to be tested.  Back up pathology available if needed in specimen [id number] collected

## 2020-02-18 ENCOUNTER — OFFICE VISIT (OUTPATIENT)
Dept: SURGERY | Facility: CLINIC | Age: 55
End: 2020-02-18
Payer: COMMERCIAL

## 2020-02-18 VITALS
SYSTOLIC BLOOD PRESSURE: 138 MMHG | BODY MASS INDEX: 30 KG/M2 | TEMPERATURE: 98 F | WEIGHT: 250 LBS | DIASTOLIC BLOOD PRESSURE: 68 MMHG

## 2020-02-18 DIAGNOSIS — C18.6 CANCER OF DESCENDING COLON (HCC): ICD-10-CM

## 2020-02-18 DIAGNOSIS — C20 RECTAL CANCER (HCC): Primary | ICD-10-CM

## 2020-02-18 DIAGNOSIS — C78.01 SECONDARY CARCINOMA OF RIGHT LUNG (HCC): ICD-10-CM

## 2020-02-18 DIAGNOSIS — C78.7 SECONDARY LIVER CANCER (HCC): ICD-10-CM

## 2020-02-18 PROCEDURE — 99213 OFFICE O/P EST LOW 20 MIN: CPT | Performed by: COLON & RECTAL SURGERY

## 2020-02-18 NOTE — PROGRESS NOTES
Follow Up Visit Note       Active Problems      1. Rectal cancer (HCC) at 15 cm between the second and third rectal folds    2. Secondary liver cancer ([address]    3. Cancer of descending colon ([address] approximately 40 cm, within a large adenomatous polyp    4. Dr. Diony Hobbs for his chemotherapy course. The patient reports that he was told that his metastases are decreasing. He restarted chemotherapy 3-5 weeks post operatively from his takedown procedure.   The patient had been dealing with his insurance company and and intrahepatic bile duct C18.9 Malignant neoplasm of colon, unspecified     TECHNIQUE:  IV contrast-enhanced scanning through the chest, abdomen, and pelvis was performed. Dose reduction techniques were used.  Dose information is transmitted to the ACR ( dilatation or calcification. PANCREAS:  No lesion, fluid collection, ductal dilatation, or atrophy. SPLEEN:  No enlargement or focal lesion. KIDNEYS:  Left parapelvic cyst measures 3.2 x 2.6 cm. Renal collecting systems are not dilated.     Wicho Arthur Performed by Misty Glass MD at [facility] MAIN OR       The family history and social history have been reviewed by me today.     Family History   Problem Relation Age of Onset   • Diabetes Father         borderline   • Lipids Father    • Obesity Father    • Lip for abdominal distention, abdominal pain, anal bleeding, blood in stool, constipation, diarrhea, nausea and vomiting. Genitourinary: Negative for difficulty urinating, dysuria, frequency and urgency.    Musculoskeletal: Negative for arthralgias and myalgi or peritonitis. The liver and spleen are nonpalpable. There are no palpable masses or hernias. Well-healed prior incisions. Body mass index is 30.71 kg/m². Musculoskeletal: Normal range of motion.    Lymphadenopathy:     He has no cervical adenopathy demonstrated minimal increase in metastatic lung nodules, and mild increase in liver metastasis. The patient's most recent CEA was on November 7, 2019 and had increased to 331.   The patient's most recent colonoscopy was performed by myself on April 11, 2 be any recurrence at today's visit.   The patient will be going to the [facility] to potentially be started in a clinical trial.  We wish the patient the best with his hopeful new chemotherapy regimen.     I will continue to see the patient on an

## 2020-02-19 NOTE — PATIENT INSTRUCTIONS
Anne-Marie Olguin is a 48year old male here for continued care following his rectal and colon cancer.       His presenting history is below:     This patient's history starts with a colonoscopy for screening purposes on August 9, 2018. Kayy Early was found to [facility] in an attempt to get him in a clinical trial on a new chemotherapy.     At this time the patient denies any new symptoms. He denies any abdominal pain, nausea, vomiting, fevers, chills, or diarrhea.   He denies any blood, mucus, or dark tarry stool

## 2020-02-20 ENCOUNTER — SOCIAL WORK SERVICES (OUTPATIENT)
Dept: HEMATOLOGY/ONCOLOGY | Facility: HOSPITAL | Age: 55
End: 2020-02-20

## 2020-02-20 ENCOUNTER — OFFICE VISIT (OUTPATIENT)
Dept: HEMATOLOGY/ONCOLOGY | Age: 55
End: 2020-02-20
Attending: INTERNAL MEDICINE
Payer: COMMERCIAL

## 2020-02-20 VITALS
TEMPERATURE: 97 F | DIASTOLIC BLOOD PRESSURE: 90 MMHG | HEART RATE: 74 BPM | OXYGEN SATURATION: 99 % | RESPIRATION RATE: 16 BRPM | WEIGHT: 254.5 LBS | SYSTOLIC BLOOD PRESSURE: 137 MMHG | BODY MASS INDEX: 30 KG/M2

## 2020-02-20 DIAGNOSIS — C78.01 SECONDARY CARCINOMA OF RIGHT LUNG (HCC): ICD-10-CM

## 2020-02-20 DIAGNOSIS — C20 RECTAL CANCER (HCC): ICD-10-CM

## 2020-02-20 DIAGNOSIS — C78.02 SECONDARY ADENOCARCINOMA OF LEFT LUNG (HCC): ICD-10-CM

## 2020-02-20 DIAGNOSIS — C78.7 SECONDARY LIVER CANCER (HCC): Primary | ICD-10-CM

## 2020-02-20 DIAGNOSIS — C78.7 SECONDARY LIVER CANCER (HCC): ICD-10-CM

## 2020-02-20 DIAGNOSIS — C20 RECTAL CANCER (HCC): Primary | ICD-10-CM

## 2020-02-20 LAB
ALBUMIN SERPL-MCNC: 3.5 G/DL (ref 3.4–5)
ALBUMIN/GLOB SERPL: 0.9 {RATIO} (ref 1–2)
ALP LIVER SERPL-CCNC: 107 U/L (ref 45–117)
ALT SERPL-CCNC: 35 U/L (ref 16–61)
ANION GAP SERPL CALC-SCNC: 5 MMOL/L (ref 0–18)
AST SERPL-CCNC: 33 U/L (ref 15–37)
BASOPHILS # BLD AUTO: 0.04 X10(3) UL (ref 0–0.2)
BASOPHILS NFR BLD AUTO: 0.5 %
BILIRUB SERPL-MCNC: 0.5 MG/DL (ref 0.1–2)
BUN BLD-MCNC: 12 MG/DL (ref 7–18)
BUN/CREAT SERPL: 11.5 (ref 10–20)
CALCIUM BLD-MCNC: 8.3 MG/DL (ref 8.5–10.1)
CEA SERPL-MCNC: 3489.4 NG/ML (ref ?–5)
CHLORIDE SERPL-SCNC: 107 MMOL/L (ref 98–112)
CO2 SERPL-SCNC: 28 MMOL/L (ref 21–32)
CREAT BLD-MCNC: 1.04 MG/DL (ref 0.7–1.3)
DEPRECATED RDW RBC AUTO: 45.1 FL (ref 35.1–46.3)
EOSINOPHIL # BLD AUTO: 0.29 X10(3) UL (ref 0–0.7)
EOSINOPHIL NFR BLD AUTO: 3.6 %
ERYTHROCYTE [DISTWIDTH] IN BLOOD BY AUTOMATED COUNT: 13.3 % (ref 11–15)
GLOBULIN PLAS-MCNC: 4 G/DL (ref 2.8–4.4)
GLUCOSE BLD-MCNC: 109 MG/DL (ref 70–99)
HCT VFR BLD AUTO: 39.7 % (ref 39–53)
HGB BLD-MCNC: 12.7 G/DL (ref 13–17.5)
IMM GRANULOCYTES # BLD AUTO: 0.02 X10(3) UL (ref 0–1)
IMM GRANULOCYTES NFR BLD: 0.3 %
LYMPHOCYTES # BLD AUTO: 1.26 X10(3) UL (ref 1–4)
LYMPHOCYTES NFR BLD AUTO: 15.8 %
M PROTEIN MFR SERPL ELPH: 7.5 G/DL (ref 6.4–8.2)
MCH RBC QN AUTO: 29.3 PG (ref 26–34)
MCHC RBC AUTO-ENTMCNC: 32 G/DL (ref 31–37)
MCV RBC AUTO: 91.7 FL (ref 80–100)
MONOCYTES # BLD AUTO: 0.57 X10(3) UL (ref 0.1–1)
MONOCYTES NFR BLD AUTO: 7.1 %
NEUTROPHILS # BLD AUTO: 5.81 X10 (3) UL (ref 1.5–7.7)
NEUTROPHILS # BLD AUTO: 5.81 X10(3) UL (ref 1.5–7.7)
NEUTROPHILS NFR BLD AUTO: 72.7 %
OSMOLALITY SERPL CALC.SUM OF ELEC: 290 MOSM/KG (ref 275–295)
PATIENT FASTING Y/N/NP: NO
PLATELET # BLD AUTO: 172 10(3)UL (ref 150–450)
POTASSIUM SERPL-SCNC: 3.8 MMOL/L (ref 3.5–5.1)
RBC # BLD AUTO: 4.33 X10(6)UL (ref 4.3–5.7)
SODIUM SERPL-SCNC: 140 MMOL/L (ref 136–145)
WBC # BLD AUTO: 8 X10(3) UL (ref 4–11)

## 2020-02-20 PROCEDURE — 36591 DRAW BLOOD OFF VENOUS DEVICE: CPT

## 2020-02-20 PROCEDURE — 85025 COMPLETE CBC W/AUTO DIFF WBC: CPT

## 2020-02-20 PROCEDURE — 99214 OFFICE O/P EST MOD 30 MIN: CPT | Performed by: INTERNAL MEDICINE

## 2020-02-20 PROCEDURE — 82378 CARCINOEMBRYONIC ANTIGEN: CPT

## 2020-02-20 PROCEDURE — 80053 COMPREHEN METABOLIC PANEL: CPT

## 2020-02-20 RX ORDER — ONDANSETRON 8 MG/1
TABLET, ORALLY DISINTEGRATING ORAL
COMMUNITY
Start: 2018-11-12 | End: 2020-02-20

## 2020-02-20 NOTE — PROGRESS NOTES
spoke to Patient about his Disability as he plans to not return to work due to treatment/after conversation with Doctor (start of disability 2/20/2020).   advised Patient to speak with employer about Short Term Disability (Patient

## 2020-02-20 NOTE — PROGRESS NOTES
Patient is here for MD f/cristóbal for rectal cancer. Patient c/o dizziness when walking or getting up from a sitting position. No nausea or vomiting. Denies pain.  Patient was seen at [facility] 2 weeks ago for possible clinical trial. The trial is not yet open and pa

## 2020-02-21 NOTE — PROGRESS NOTES
[address]    PATIENT'S NAME: Toya Hickman   ATTENDING PHYSICIAN: Ludy Gomes M.D.    PATIENT ACCOUNT #: [de-identified] LOCATION: [address] [place] [address] RECORD #: [id number] YOB: 1965   DATE OF SERVICE: 02/20/2020       CANCER ROSA points when he stood up today. HEENT:  Otherwise, unremarkable. He has pink conjunctivae, anicteric sclerae. Pharynx without lesions. LYMPHATICS:  He has no cervical, supraclavicular, or axillary adenopathy.    LUNGS:  Resonant to percussion and liana M.D.  d: 02/21/2020 06:39:47  t: 02/21/2020 08:20:45  [facility] [id number]  HR/    cc: ALEYDA Mathew M.D. Radonna Deep, M.D. Dr. Jenell Dama

## 2020-02-25 ENCOUNTER — SOCIAL WORK SERVICES (OUTPATIENT)
Dept: HEMATOLOGY/ONCOLOGY | Facility: HOSPITAL | Age: 55
End: 2020-02-25

## 2020-02-25 NOTE — PROGRESS NOTES
spoke with patient to confirm dates and completed Disability Forms, faxing to [employer] at S#[phone]    *Attempted to fax multiple times with no success.  Called Bren Mendez and was informed no other fax number or e-mail is available and was i

## 2020-03-18 ENCOUNTER — TELEPHONE (OUTPATIENT)
Dept: HEMATOLOGY/ONCOLOGY | Facility: HOSPITAL | Age: 55
End: 2020-03-18

## 2020-03-18 ENCOUNTER — HOSPITAL ENCOUNTER (OUTPATIENT)
Dept: MRI IMAGING | Facility: HOSPITAL | Age: 55
Discharge: HOME | End: 2020-03-18
Attending: INTERNAL MEDICINE
Payer: COMMERCIAL

## 2020-03-18 DIAGNOSIS — C78.00 MALIGNANT NEOPLASM METASTATIC TO LUNG, UNSPECIFIED LATERALITY (HCC): ICD-10-CM

## 2020-03-18 DIAGNOSIS — R42 DIZZY: ICD-10-CM

## 2020-03-18 PROCEDURE — 70553 MRI BRAIN STEM W/O & W/DYE: CPT | Performed by: INTERNAL MEDICINE

## 2020-03-18 PROCEDURE — A9575 INJ GADOTERATE MEGLUMI 0.1ML: HCPCS | Performed by: INTERNAL MEDICINE

## 2020-03-18 RX ORDER — DEXAMETHASONE 4 MG/1
TABLET ORAL
Qty: 60 TABLET | Refills: 2 | Status: SHIPPED | OUTPATIENT
Start: 2020-03-18 | End: 2020-06-23

## 2020-03-18 NOTE — TELEPHONE ENCOUNTER
Got a call from Radiology about MRI - metastatic lesion overlaying the vermis. Significant edema. Had vomiting yesterday - better today. Ordered stat dexamethasone. They will take two tabs twice a day to start and will see Dr Lang Dash tomorrow.   If less

## 2020-03-19 ENCOUNTER — HOSPITAL ENCOUNTER (OUTPATIENT)
Dept: RADIATION ONCOLOGY | Age: 55
Discharge: HOME | End: 2020-03-19
Attending: RADIOLOGY
Payer: COMMERCIAL

## 2020-03-19 VITALS
OXYGEN SATURATION: 96 % | HEART RATE: 88 BPM | WEIGHT: 236.5 LBS | BODY MASS INDEX: 28 KG/M2 | TEMPERATURE: 98 F | RESPIRATION RATE: 20 BRPM

## 2020-03-19 DIAGNOSIS — C79.31 SECONDARY MALIGNANT NEOPLASM OF BRAIN (HCC): Primary | ICD-10-CM

## 2020-03-19 PROCEDURE — 99214 OFFICE O/P EST MOD 30 MIN: CPT

## 2020-03-19 PROCEDURE — 77290 THER RAD SIMULAJ FIELD CPLX: CPT | Performed by: INTERNAL MEDICINE

## 2020-03-19 PROCEDURE — 77399 UNLISTED PX MED RADJ PHYSICS: CPT | Performed by: INTERNAL MEDICINE

## 2020-03-19 PROCEDURE — 77334 RADIATION TREATMENT AID(S): CPT | Performed by: INTERNAL MEDICINE

## 2020-03-19 RX ORDER — PREDNISOLONE ACETATE 10 MG/ML
1 SUSPENSION/ DROPS OPHTHALMIC
COMMUNITY
Start: 2020-03-12 | End: 2020-05-14

## 2020-03-19 RX ORDER — PANTOPRAZOLE SODIUM 40 MG/1
40 TABLET, DELAYED RELEASE ORAL DAILY
Qty: 30 TABLET | Refills: 1 | Status: SHIPPED | OUTPATIENT
Start: 2020-03-19 | End: 2020-06-23

## 2020-03-19 RX ORDER — TETRAHYDROZOLINE HCL 0.05 %
DROPS OPHTHALMIC (EYE)
COMMUNITY
Start: 2020-03-12 | End: 2020-05-14

## 2020-03-19 NOTE — PATIENT INSTRUCTIONS
- DR GIBSON WILL CONTACT [facility] TO COORDINATE WITH YOUR CHEMO INFUSION.     - STARTING 3/19 TAKE DEXAMETHASONE 8MG ONCE DAILY IN AM.    - STARTING 3/23 TAKE DEXAMETHASONE 6MG ONCE DAILY IN AM.    - START PANTOPRAZOLE DAILY ON AN EMPTY STOMACH, 30MIN B

## 2020-03-19 NOTE — CONSULTS
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      Physical Exam   03/19/20  1122   Pulse: 88   Resp: 20   Temp: 98.2 °F (36.8 °C)     KPS: 90    Gen: NAD  HEENT: NCAT  CV: RRR  Lungs: CTAB  Abd: soft, NTND  Ext: wwp, no cyanosis or edema  Neuro: CN II-XII intact, strength 5/5 throughout, +lisa

## 2020-03-19 NOTE — PROGRESS NOTES
Nursing Re- Consult Note    Patient: Christian Marks  YOB: 1965  Age: 48year old  Lionel Valadez MD  Referring Physician: Dr. Peri Smith [facility])  Diagnosis: METASTATIC RECTAL CA  Consult Date: 3 and Allergies review by RN: yes

## 2020-03-23 ENCOUNTER — HOSPITAL ENCOUNTER (OUTPATIENT)
Dept: RADIATION ONCOLOGY | Age: 55
Discharge: HOME | End: 2020-03-23
Attending: RADIOLOGY
Payer: COMMERCIAL

## 2020-03-23 PROCEDURE — 77334 RADIATION TREATMENT AID(S): CPT | Performed by: INTERNAL MEDICINE

## 2020-03-23 PROCEDURE — 77412 RADIATION TX DELIVERY LVL 3: CPT | Performed by: INTERNAL MEDICINE

## 2020-03-23 PROCEDURE — 77300 RADIATION THERAPY DOSE PLAN: CPT | Performed by: INTERNAL MEDICINE

## 2020-03-23 PROCEDURE — 77295 3-D RADIOTHERAPY PLAN: CPT | Performed by: INTERNAL MEDICINE

## 2020-03-24 PROCEDURE — 77412 RADIATION TX DELIVERY LVL 3: CPT | Performed by: INTERNAL MEDICINE

## 2020-03-24 PROCEDURE — 77387 GUIDANCE FOR RADJ TX DLVR: CPT | Performed by: INTERNAL MEDICINE

## 2020-03-25 PROCEDURE — 77387 GUIDANCE FOR RADJ TX DLVR: CPT | Performed by: INTERNAL MEDICINE

## 2020-03-25 PROCEDURE — 77412 RADIATION TX DELIVERY LVL 3: CPT | Performed by: INTERNAL MEDICINE

## 2020-03-26 ENCOUNTER — APPOINTMENT (OUTPATIENT)
Dept: RADIATION ONCOLOGY | Age: 55
End: 2020-03-26
Attending: RADIOLOGY
Payer: COMMERCIAL

## 2020-03-26 PROCEDURE — 77338 DESIGN MLC DEVICE FOR IMRT: CPT | Performed by: INTERNAL MEDICINE

## 2020-03-26 PROCEDURE — 77300 RADIATION THERAPY DOSE PLAN: CPT | Performed by: INTERNAL MEDICINE

## 2020-03-26 PROCEDURE — 77412 RADIATION TX DELIVERY LVL 3: CPT | Performed by: INTERNAL MEDICINE

## 2020-03-26 PROCEDURE — 77301 RADIOTHERAPY DOSE PLAN IMRT: CPT | Performed by: INTERNAL MEDICINE

## 2020-03-27 ENCOUNTER — HOSPITAL ENCOUNTER (OUTPATIENT)
Dept: RADIATION ONCOLOGY | Age: 55
Discharge: HOME | End: 2020-03-27
Attending: RADIOLOGY
Payer: COMMERCIAL

## 2020-03-27 VITALS
BODY MASS INDEX: 28 KG/M2 | WEIGHT: 234.19 LBS | TEMPERATURE: 98 F | SYSTOLIC BLOOD PRESSURE: 113 MMHG | DIASTOLIC BLOOD PRESSURE: 77 MMHG | RESPIRATION RATE: 18 BRPM | HEART RATE: 72 BPM | OXYGEN SATURATION: 97 %

## 2020-03-27 DIAGNOSIS — C79.31 SECONDARY MALIGNANT NEOPLASM OF BRAIN (HCC): Primary | ICD-10-CM

## 2020-03-27 PROCEDURE — 77386 HC IMRT COMPLEX: CPT | Performed by: INTERNAL MEDICINE

## 2020-03-27 RX ORDER — ONDANSETRON 4 MG/1
4 TABLET, FILM COATED ORAL EVERY 8 HOURS PRN
COMMUNITY
End: 2020-04-02

## 2020-03-27 RX ORDER — PROCHLORPERAZINE MALEATE 10 MG
10 TABLET ORAL EVERY 6 HOURS PRN
COMMUNITY
End: 2020-06-23

## 2020-03-27 NOTE — PROGRESS NOTES
[facility] Radiation Treatment Management Note 1-5    Patient:  Orlando Leon  Age:  48year old  Visit Diagnosis:    1.  Secondary malignant neoplasm of brain [facility])      Primary Rad/Onc:  Dr. Hutchins [facility]    Site Delivered Dose

## 2020-03-30 PROCEDURE — 77386 HC IMRT COMPLEX: CPT | Performed by: INTERNAL MEDICINE

## 2020-03-31 PROCEDURE — 77386 HC IMRT COMPLEX: CPT | Performed by: INTERNAL MEDICINE

## 2020-04-01 ENCOUNTER — HOSPITAL ENCOUNTER (OUTPATIENT)
Dept: RADIATION ONCOLOGY | Age: 55
Discharge: HOME | End: 2020-04-01
Attending: RADIOLOGY
Payer: COMMERCIAL

## 2020-04-01 DIAGNOSIS — C79.31 BRAIN METASTASIS (HCC): Primary | ICD-10-CM

## 2020-04-01 PROCEDURE — 77386 HC IMRT COMPLEX: CPT | Performed by: INTERNAL MEDICINE

## 2020-04-02 ENCOUNTER — HOSPITAL ENCOUNTER (OUTPATIENT)
Dept: RADIATION ONCOLOGY | Age: 55
Discharge: HOME | End: 2020-04-02
Attending: RADIOLOGY
Payer: COMMERCIAL

## 2020-04-02 ENCOUNTER — APPOINTMENT (OUTPATIENT)
Dept: RADIATION ONCOLOGY | Age: 55
End: 2020-04-02
Attending: RADIOLOGY
Payer: COMMERCIAL

## 2020-04-02 ENCOUNTER — APPOINTMENT (OUTPATIENT)
Dept: RADIATION ONCOLOGY | Facility: HOSPITAL | Age: 55
End: 2020-04-02
Attending: RADIOLOGY
Payer: COMMERCIAL

## 2020-04-02 VITALS
OXYGEN SATURATION: 97 % | HEART RATE: 77 BPM | TEMPERATURE: 98 F | SYSTOLIC BLOOD PRESSURE: 117 MMHG | WEIGHT: 232.81 LBS | DIASTOLIC BLOOD PRESSURE: 74 MMHG | BODY MASS INDEX: 28 KG/M2 | RESPIRATION RATE: 16 BRPM

## 2020-04-02 DIAGNOSIS — C79.31 SECONDARY MALIGNANT NEOPLASM OF BRAIN (HCC): Primary | ICD-10-CM

## 2020-04-02 PROCEDURE — 77386 HC IMRT COMPLEX: CPT | Performed by: INTERNAL MEDICINE

## 2020-04-02 RX ORDER — ONDANSETRON 4 MG/1
4 TABLET, FILM COATED ORAL EVERY 8 HOURS PRN
Qty: 30 TABLET | Refills: 0 | Status: SHIPPED | OUTPATIENT
Start: 2020-04-02 | End: 2020-06-23

## 2020-04-02 NOTE — PATIENT INSTRUCTIONS
POST-RADIATION INSTRUCTIONS:   - WE WILL CALL YOU FOR A FOLLOW-UP APPOINTMENT   - HOLD OFF ON STUDY DRUG UNTIL YOU GET NEW DIRECTIONS FROM EITHER DR. GIBSON OR YOUR [facility] ONC MD. DR. Amee Dodge WILL CALL [facility] TO DISCUSS YOUR CARE.   - [facility]

## 2020-04-02 NOTE — PROGRESS NOTES
[facility] Radiation Treatment Management Note 6-10    Patient:  Mariza Broad  Age:  48year old  Visit Diagnosis:    1.  Secondary malignant neoplasm of brain [facility])      Primary Rad/Onc:  Dr. Ventura Cancer

## 2020-04-03 ENCOUNTER — DOCUMENTATION ONLY (OUTPATIENT)
Dept: RADIATION ONCOLOGY | Age: 55
End: 2020-04-03

## 2020-04-03 PROCEDURE — 77386 HC IMRT COMPLEX: CPT | Performed by: INTERNAL MEDICINE

## 2020-04-03 PROCEDURE — 77336 RADIATION PHYSICS CONSULT: CPT | Performed by: INTERNAL MEDICINE

## 2020-04-03 RX ORDER — DEXAMETHASONE 1 MG
TABLET ORAL
Qty: 4 TABLET | Refills: 0 | Status: SHIPPED | OUTPATIENT
Start: 2020-04-03 | End: 2020-05-14

## 2020-04-06 NOTE — PROGRESS NOTES
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

## 2020-04-15 ENCOUNTER — TELEPHONE (OUTPATIENT)
Dept: RADIATION ONCOLOGY | Facility: HOSPITAL | Age: 55
End: 2020-04-15

## 2020-04-15 NOTE — TELEPHONE ENCOUNTER
Telephone call placed to patient to clarify his question regarding pantroprazole. Pt has completed steroid taper and denies headache, dizziness, and nausea for the last week. Denies any heartburn symptoms.  Instructed pt that he can discontinue pantoprazole

## 2020-05-07 ENCOUNTER — HOSPITAL ENCOUNTER (OUTPATIENT)
Dept: MRI IMAGING | Facility: HOSPITAL | Age: 55
Discharge: HOME | End: 2020-05-07
Attending: INTERNAL MEDICINE
Payer: COMMERCIAL

## 2020-05-07 DIAGNOSIS — C79.31 BRAIN METASTASIS (HCC): ICD-10-CM

## 2020-05-07 PROCEDURE — A9575 INJ GADOTERATE MEGLUMI 0.1ML: HCPCS | Performed by: INTERNAL MEDICINE

## 2020-05-07 PROCEDURE — 70553 MRI BRAIN STEM W/O & W/DYE: CPT | Performed by: INTERNAL MEDICINE

## 2020-05-08 ENCOUNTER — TELEPHONE (OUTPATIENT)
Dept: RADIATION ONCOLOGY | Facility: HOSPITAL | Age: 55
End: 2020-05-08

## 2020-05-08 NOTE — TELEPHONE ENCOUNTER
Updated patient on brain MRI results. Treated cerebellar lesion is smaller but with some increased edema. There is a new 0.8 cm lesion in the posterior L parietal lobe. In retrospect, it was likely present in March 2020 but was not conspicuous.      Eliseo

## 2020-05-11 ENCOUNTER — SOCIAL WORK SERVICES (OUTPATIENT)
Dept: HEMATOLOGY/ONCOLOGY | Facility: HOSPITAL | Age: 55
End: 2020-05-11

## 2020-05-11 NOTE — PROGRESS NOTES
spoke with Patient about extending his disability; requested patient contact [employer] for extension paperwork to be faxed to [employer]; will wait for forms and connect with patient about his timeline at that point.

## 2020-05-13 ENCOUNTER — HOSPITAL ENCOUNTER (OUTPATIENT)
Dept: RADIATION ONCOLOGY | Facility: HOSPITAL | Age: 55
Discharge: HOME | End: 2020-05-13
Attending: RADIOLOGY
Payer: COMMERCIAL

## 2020-05-13 ENCOUNTER — SOCIAL WORK SERVICES (OUTPATIENT)
Dept: HEMATOLOGY/ONCOLOGY | Facility: HOSPITAL | Age: 55
End: 2020-05-13

## 2020-05-13 PROCEDURE — 77399 UNLISTED PX MED RADJ PHYSICS: CPT | Performed by: INTERNAL MEDICINE

## 2020-05-13 PROCEDURE — 77470 SPECIAL RADIATION TREATMENT: CPT | Performed by: INTERNAL MEDICINE

## 2020-05-13 PROCEDURE — 77334 RADIATION TREATMENT AID(S): CPT | Performed by: INTERNAL MEDICINE

## 2020-05-14 ENCOUNTER — NURSE ONLY (OUTPATIENT)
Dept: HEMATOLOGY/ONCOLOGY | Facility: HOSPITAL | Age: 55
End: 2020-05-14
Attending: INTERNAL MEDICINE
Payer: COMMERCIAL

## 2020-05-14 VITALS
DIASTOLIC BLOOD PRESSURE: 78 MMHG | HEIGHT: 77.01 IN | RESPIRATION RATE: 16 BRPM | TEMPERATURE: 98 F | WEIGHT: 241.38 LBS | HEART RATE: 85 BPM | OXYGEN SATURATION: 98 % | SYSTOLIC BLOOD PRESSURE: 113 MMHG | BODY MASS INDEX: 28.5 KG/M2

## 2020-05-14 DIAGNOSIS — C78.02 SECONDARY ADENOCARCINOMA OF LEFT LUNG (HCC): ICD-10-CM

## 2020-05-14 DIAGNOSIS — C20 RECTAL CANCER (HCC): ICD-10-CM

## 2020-05-14 DIAGNOSIS — C78.01 SECONDARY CARCINOMA OF RIGHT LUNG (HCC): ICD-10-CM

## 2020-05-14 DIAGNOSIS — C20 RECTAL CANCER (HCC): Primary | ICD-10-CM

## 2020-05-14 DIAGNOSIS — C78.7 SECONDARY LIVER CANCER (HCC): ICD-10-CM

## 2020-05-14 PROCEDURE — 83615 LACTATE (LD) (LDH) ENZYME: CPT

## 2020-05-14 PROCEDURE — 85025 COMPLETE CBC W/AUTO DIFF WBC: CPT

## 2020-05-14 PROCEDURE — 82378 CARCINOEMBRYONIC ANTIGEN: CPT

## 2020-05-14 PROCEDURE — 99214 OFFICE O/P EST MOD 30 MIN: CPT | Performed by: INTERNAL MEDICINE

## 2020-05-14 PROCEDURE — 80053 COMPREHEN METABOLIC PANEL: CPT

## 2020-05-14 PROCEDURE — 36591 DRAW BLOOD OFF VENOUS DEVICE: CPT

## 2020-05-14 NOTE — PROGRESS NOTES
Patient is here for MD f/u for rectal cancer. Patient is currently on Dexamethasone 4 mg daily as of Tuesday. Patient had an MRI of the brain on 5/7. New brain lesion found. Patient met with Rad/Onc this week.  He will be starting radiation in the next week

## 2020-05-18 PROCEDURE — 77295 3-D RADIOTHERAPY PLAN: CPT | Performed by: INTERNAL MEDICINE

## 2020-05-18 PROCEDURE — 77300 RADIATION THERAPY DOSE PLAN: CPT | Performed by: INTERNAL MEDICINE

## 2020-05-18 PROCEDURE — 77334 RADIATION TREATMENT AID(S): CPT | Performed by: INTERNAL MEDICINE

## 2020-05-19 ENCOUNTER — OFFICE VISIT (OUTPATIENT)
Dept: SURGERY | Facility: CLINIC | Age: 55
End: 2020-05-19
Payer: COMMERCIAL

## 2020-05-19 VITALS
BODY MASS INDEX: 28.46 KG/M2 | SYSTOLIC BLOOD PRESSURE: 134 MMHG | TEMPERATURE: 98 F | HEART RATE: 75 BPM | HEIGHT: 77 IN | WEIGHT: 241 LBS | DIASTOLIC BLOOD PRESSURE: 81 MMHG

## 2020-05-19 DIAGNOSIS — C79.31 SECONDARY CANCER OF BRAIN (HCC): ICD-10-CM

## 2020-05-19 DIAGNOSIS — C78.01 SECONDARY CARCINOMA OF RIGHT LUNG (HCC): ICD-10-CM

## 2020-05-19 DIAGNOSIS — C20 RECTAL CANCER (HCC): Primary | ICD-10-CM

## 2020-05-19 DIAGNOSIS — C78.7 SECONDARY LIVER CANCER (HCC): ICD-10-CM

## 2020-05-19 DIAGNOSIS — C79.51 SECONDARY CANCER OF BONE (HCC): ICD-10-CM

## 2020-05-19 PROCEDURE — 99213 OFFICE O/P EST LOW 20 MIN: CPT | Performed by: COLON & RECTAL SURGERY

## 2020-05-19 RX ORDER — CAPECITABINE 500 MG/1
1000 TABLET, FILM COATED ORAL 2 TIMES DAILY
Qty: 140 TABLET | Refills: 5 | Status: SHIPPED | OUTPATIENT
Start: 2020-05-19 | End: 2020-05-20

## 2020-05-19 RX ORDER — PROCHLORPERAZINE MALEATE 10 MG
10 TABLET ORAL EVERY 6 HOURS PRN
Qty: 30 TABLET | Refills: 3 | Status: SHIPPED | OUTPATIENT
Start: 2020-05-19 | End: 2020-01-01

## 2020-05-19 RX ORDER — ONDANSETRON HYDROCHLORIDE 8 MG/1
8 TABLET, FILM COATED ORAL EVERY 8 HOURS PRN
Qty: 30 TABLET | Refills: 3 | Status: SHIPPED | OUTPATIENT
Start: 2020-05-19 | End: 2020-01-01

## 2020-05-19 NOTE — PROGRESS NOTES
[facility]    PATIENT'S NAME: Mason Washburn   ATTENDING PHYSICIAN: Maia Wahl M.D.    PATIENT ACCOUNT #: [de-identified] LOCATION: [address] [place] [address] RECORD #: [id number] YOB: 1965   DATE OF SERVICE: 05/14/2020       CANCER ROSA the last 24 to 48 hours and he is feeling marginally better. He has general fatigue and weakness. He is not working at this point and he is on disability. He has mild nausea and he is mildly unsteady on his feet. He has not fallen.   He has not been sle The second drug is tucatinib, which is given together with trastuzumab and capecitabine and it has a substantial degree of activity in the CNS.   I will try to obtain this drug for him on an off-label basis, either with his insurance company or with applyin

## 2020-05-19 NOTE — PATIENT INSTRUCTIONS
Mariza Eden is a 48year old male here for continued care following his rectal and colon cancer.       His presenting history is below:     This patient's history starts with a colonoscopy for screening purposes on August 9, 2018. [facility] was found to Pao Saxena trial therapies and is currently being scheduled to undergo radiation.     The patient denies having any abdominal symptoms at this time. He denies any abdominal pain, constipation, diarrhea, blood, mucus, or dark tarry stools.   He is having regular, soft

## 2020-05-19 NOTE — PROGRESS NOTES
Follow Up Visit Note       Active Problems      1. Rectal cancer (HCC) at 15 cm between the second and third rectal folds    2. Secondary liver cancer ([address]    3. Secondary carcinoma of right lung ([address]    4. Secondary cancer of bone ([address]    5.  Secondary trialed on anti-HER-2 chemotherapy drugs with Dr. Camryn Alexandra. The patient was not responding to these drugs. The patient was subsequently referred downtown to the [address] [place] [address] for clinical trials of new chemotherapy agents.   Unfortunately, on March Cancer   • High cholesterol     borderline   • Visual impairment     glasses/contacts     Past Surgical History:   Procedure Laterality Date   • COLECTOMY  10/12/2018   • COLONOSCOPY  08/09/2018   • ILEOSTOMY TAKE DOWN N/A 4/24/2019    Performed by Elmer Byrne, daily with breakfast. One pill twice daily ), Disp: 60 tablet, Rfl: 2  •  XARELTO 20 MG Oral Tab, TAKE 1 TABLET (20 MG TOTAL) BY MOUTH DAILY WITH FOOD., Disp: 90 tablet, Rfl: 3  •  Multiple Vitamins Oral Tab, Take 1 tablet by mouth daily. , Disp: , Rfl: heard.  Pulmonary/Chest: Effort normal and breath sounds normal. No respiratory distress. He has no wheezes. He has no rales. Abdominal: Soft.  Normal appearance and bowel sounds are normal. He exhibits no shifting dullness, no distension, no fluid wave, patient underwent a laparoscopic robotic low anterior resection of the rectosigmoid colon with a very extensive resection of the descending colon as well.  This left him with a very low anastomosis.  This was protected with ileostomy.  During the course in on his feet and he is unable to ambulate without dizziness or nausea.     Clinical examination of the abdomen reveals it to be soft, nondistended, nontender, bowel sounds are normal activity normal pitch. There  is no rebounding tenderness or guarding.   Melo Ceballos

## 2020-05-20 RX ORDER — CAPECITABINE 500 MG/1
1000 TABLET, FILM COATED ORAL 2 TIMES DAILY
Qty: 140 TABLET | Refills: 5 | Status: SHIPPED | OUTPATIENT
Start: 2020-05-20 | End: 2020-05-21

## 2020-05-21 ENCOUNTER — HOSPITAL ENCOUNTER (OUTPATIENT)
Dept: RADIATION ONCOLOGY | Facility: HOSPITAL | Age: 55
Discharge: HOME | End: 2020-05-21
Attending: RADIOLOGY
Payer: COMMERCIAL

## 2020-05-21 ENCOUNTER — DOCUMENTATION ONLY (OUTPATIENT)
Dept: RADIATION ONCOLOGY | Facility: HOSPITAL | Age: 55
End: 2020-05-21

## 2020-05-21 VITALS
RESPIRATION RATE: 16 BRPM | WEIGHT: 242 LBS | TEMPERATURE: 97 F | SYSTOLIC BLOOD PRESSURE: 113 MMHG | DIASTOLIC BLOOD PRESSURE: 76 MMHG | HEART RATE: 84 BPM | OXYGEN SATURATION: 97 % | BODY MASS INDEX: 29 KG/M2

## 2020-05-21 DIAGNOSIS — C79.31 SECONDARY MALIGNANT NEOPLASM OF BRAIN (HCC): Primary | ICD-10-CM

## 2020-05-21 PROCEDURE — 77280 THER RAD SIMULAJ FIELD SMPL: CPT | Performed by: INTERNAL MEDICINE

## 2020-05-21 PROCEDURE — 77372 SRS LINEAR BASED: CPT | Performed by: INTERNAL MEDICINE

## 2020-05-21 NOTE — PROGRESS NOTES
[facility] Radiation Treatment Management Note 1-5    Patient:  Barrington Ward  Age:  48year old  Visit Diagnosis:    1.  Secondary malignant neoplasm of brain [facility])      Primary Rad/Onc:  Dr. Mendez Nesbitt    Site Delivered Dose

## 2020-05-21 NOTE — PATIENT INSTRUCTIONS
Stay on Decadron 4mg until 5/24/2020. Starting Monday go down to Decadron 2mg until you see Dr. Wallis Felty in 2 weeks. Schedule follow up with Dr. Wallis Felty for 2 weeks call [phone]. Follow up with Dr. Reema Davis as directed by his office.

## 2020-05-22 ENCOUNTER — HOSPITAL ENCOUNTER (OUTPATIENT)
Dept: CT IMAGING | Age: 55
Discharge: HOME | End: 2020-05-22
Attending: INTERNAL MEDICINE
Payer: COMMERCIAL

## 2020-05-22 DIAGNOSIS — C78.7 SECONDARY LIVER CANCER (HCC): ICD-10-CM

## 2020-05-22 DIAGNOSIS — C20 RECTAL CANCER (HCC): ICD-10-CM

## 2020-05-22 DIAGNOSIS — C78.02 SECONDARY ADENOCARCINOMA OF LEFT LUNG (HCC): ICD-10-CM

## 2020-05-22 DIAGNOSIS — C78.01 SECONDARY CARCINOMA OF RIGHT LUNG (HCC): ICD-10-CM

## 2020-05-22 PROCEDURE — 71260 CT THORAX DX C+: CPT | Performed by: INTERNAL MEDICINE

## 2020-05-22 PROCEDURE — 74177 CT ABD & PELVIS W/CONTRAST: CPT | Performed by: INTERNAL MEDICINE

## 2020-05-28 ENCOUNTER — VIRTUAL PHONE E/M (OUTPATIENT)
Dept: HEMATOLOGY/ONCOLOGY | Facility: HOSPITAL | Age: 55
End: 2020-05-28
Attending: INTERNAL MEDICINE
Payer: COMMERCIAL

## 2020-05-28 DIAGNOSIS — Z71.89 OTHER SPECIFIED COUNSELING: ICD-10-CM

## 2020-05-28 DIAGNOSIS — C18.6 CANCER OF DESCENDING COLON (HCC): Primary | ICD-10-CM

## 2020-05-28 PROCEDURE — 99442 PHONE E/M BY PHYS 11-20 MIN: CPT | Performed by: CLINICAL NURSE SPECIALIST

## 2020-05-28 NOTE — PROGRESS NOTES
Virtual Telephone Check-In    Toan Leiva verbally consents to a Virtual/Telephone Check-In visit on 05/28/20. Patient has been referred to the [facility] website at [email or web address] to review the yearly Consent to Treat document.     Patient unders episodes in 24 hours  • Constipation – No BM x 3 days, no responsive to stool softeners or laxatives  • Shortness of Breath  • Excessive fatigue or weakness  • New onset rash  • Sudden onset of any unexpected symptom – such as change in vision, sense of ba

## 2020-06-01 NOTE — PROGRESS NOTES
Radiation Oncology Treatment Summary    Diagnosis: metastatic HER2+ rectal cancer with CNS involvement     Site: left parietal lobe    Dose: 22 Gy in 1 fraction    Treatment Date: 5/21/20    Elapsed Days: 0     Concurrent Treatments: none    Radiation Ther

## 2020-06-02 ENCOUNTER — OFFICE VISIT (OUTPATIENT)
Dept: HEMATOLOGY/ONCOLOGY | Facility: HOSPITAL | Age: 55
End: 2020-06-02
Attending: INTERNAL MEDICINE
Payer: COMMERCIAL

## 2020-06-02 VITALS
OXYGEN SATURATION: 97 % | HEART RATE: 80 BPM | SYSTOLIC BLOOD PRESSURE: 113 MMHG | DIASTOLIC BLOOD PRESSURE: 73 MMHG | TEMPERATURE: 99 F | BODY MASS INDEX: 29.62 KG/M2 | WEIGHT: 250.81 LBS | HEIGHT: 77.01 IN | RESPIRATION RATE: 18 BRPM

## 2020-06-02 DIAGNOSIS — C78.01 SECONDARY CARCINOMA OF RIGHT LUNG (HCC): ICD-10-CM

## 2020-06-02 DIAGNOSIS — C79.51 SECONDARY CANCER OF BONE (HCC): ICD-10-CM

## 2020-06-02 DIAGNOSIS — C78.7 SECONDARY LIVER CANCER (HCC): Primary | ICD-10-CM

## 2020-06-02 DIAGNOSIS — C78.02 SECONDARY ADENOCARCINOMA OF LEFT LUNG (HCC): ICD-10-CM

## 2020-06-02 DIAGNOSIS — C20 RECTAL CANCER (HCC): ICD-10-CM

## 2020-06-02 DIAGNOSIS — C18.6 CANCER OF DESCENDING COLON (HCC): ICD-10-CM

## 2020-06-02 PROCEDURE — 82378 CARCINOEMBRYONIC ANTIGEN: CPT

## 2020-06-02 PROCEDURE — 80053 COMPREHEN METABOLIC PANEL: CPT

## 2020-06-02 PROCEDURE — 99214 OFFICE O/P EST MOD 30 MIN: CPT | Performed by: INTERNAL MEDICINE

## 2020-06-02 PROCEDURE — 85025 COMPLETE CBC W/AUTO DIFF WBC: CPT

## 2020-06-02 PROCEDURE — 96413 CHEMO IV INFUSION 1 HR: CPT

## 2020-06-02 RX ORDER — DIPHENHYDRAMINE HCL 25 MG
25 CAPSULE ORAL ONCE
Status: CANCELLED | OUTPATIENT
Start: 2020-06-02

## 2020-06-02 RX ORDER — DIPHENHYDRAMINE HCL 25 MG
25 CAPSULE ORAL ONCE
Status: COMPLETED | OUTPATIENT
Start: 2020-06-02 | End: 2020-06-02

## 2020-06-02 RX ORDER — ACETAMINOPHEN 325 MG/1
650 TABLET ORAL ONCE
Status: CANCELLED | OUTPATIENT
Start: 2020-06-02

## 2020-06-02 RX ORDER — ACETAMINOPHEN 325 MG/1
650 TABLET ORAL ONCE
Status: COMPLETED | OUTPATIENT
Start: 2020-06-02 | End: 2020-06-02

## 2020-06-02 RX ADMIN — DIPHENHYDRAMINE HCL 25 MG: 25 MG CAPSULE ORAL at 13:36:00

## 2020-06-02 RX ADMIN — ACETAMINOPHEN 650 MG: 325 TABLET ORAL at 13:36:00

## 2020-06-04 ENCOUNTER — HOSPITAL ENCOUNTER (OUTPATIENT)
Dept: RADIATION ONCOLOGY | Facility: HOSPITAL | Age: 55
Discharge: HOME | End: 2020-06-04
Attending: RADIOLOGY
Payer: COMMERCIAL

## 2020-06-04 ENCOUNTER — HOSPITAL ENCOUNTER (OUTPATIENT)
Dept: CV DIAGNOSTICS | Facility: HOSPITAL | Age: 55
Discharge: HOME | End: 2020-06-04
Attending: INTERNAL MEDICINE
Payer: COMMERCIAL

## 2020-06-04 VITALS
DIASTOLIC BLOOD PRESSURE: 83 MMHG | TEMPERATURE: 98 F | WEIGHT: 250.81 LBS | SYSTOLIC BLOOD PRESSURE: 139 MMHG | HEART RATE: 82 BPM | RESPIRATION RATE: 20 BRPM | BODY MASS INDEX: 30 KG/M2 | OXYGEN SATURATION: 99 %

## 2020-06-04 DIAGNOSIS — C78.02 SECONDARY ADENOCARCINOMA OF LEFT LUNG (HCC): ICD-10-CM

## 2020-06-04 DIAGNOSIS — C79.31 SECONDARY MALIGNANT NEOPLASM OF BRAIN (HCC): Primary | ICD-10-CM

## 2020-06-04 DIAGNOSIS — C78.01 SECONDARY CARCINOMA OF RIGHT LUNG (HCC): ICD-10-CM

## 2020-06-04 DIAGNOSIS — C20 RECTAL CANCER (HCC): ICD-10-CM

## 2020-06-04 DIAGNOSIS — C79.51 SECONDARY CANCER OF BONE (HCC): ICD-10-CM

## 2020-06-04 DIAGNOSIS — C78.7 SECONDARY LIVER CANCER (HCC): ICD-10-CM

## 2020-06-04 PROCEDURE — 93306 TTE W/DOPPLER COMPLETE: CPT | Performed by: INTERNAL MEDICINE

## 2020-06-04 PROCEDURE — 99213 OFFICE O/P EST LOW 20 MIN: CPT

## 2020-06-04 RX ORDER — SODIUM FLUORIDE 1.1 G/100G
GEL, DENTIFRICE ORAL NIGHTLY
COMMUNITY
Start: 2020-05-15

## 2020-06-04 NOTE — PROGRESS NOTES
Nursing Follow-Up Note    Patient: Giulia Kidd  YOB: 1965  Age: 50year old  Radiation Oncologist: Dr. Sakshi Gerardo  Referring Physician: Asmita Saeed  Chief Complaint: Patient presents with:   Follow - Up    Date: 6/4/2020    Toxi

## 2020-06-04 NOTE — PATIENT INSTRUCTIONS
- CALL [phone] FOR A FOLLOW-UP WITH DR. GIBSON AFTER YOU SCHEDULE YOUR MRI OF THE BRAIN IN EARLY JULY   - CALL CENTRAL SCHEDULING AT [phone] TO SCHEDULE YOUR MRI OF THE BRAIN IN JULY  - CONTINUE TO TAKE 1MG OF DECADRON STARTING TOMORROW FOR

## 2020-06-04 NOTE — PROGRESS NOTES
[facility]  Radiation Oncology Follow-up    Patient Name: Matt Allen   MRN: [id number]  Referring Physician: Ketty Silva MD    Diagnosis:  metastatic HER2+ rectal cancer with CNS involvement     Prior Radiation Therapy Rendered  1. Reported on 6/4/2020 ) 120 g 3   • Prochlorperazine Maleate (COMPAZINE) 10 mg tablet Take 1 tablet (10 mg total) by mouth every 6 (six) hours as needed for Nausea.  (Patient not taking: Reported on 6/4/2020 ) 30 tablet 3   • Ondansetron HCl (ZOFRAN) 8 MG ta tolerating it well. Plan:     -Decrease steroids to dexamethasone 1 mg daily for 2 weeks then stop. Patient is off PPI due to potential interaction with chemotherapy.   Patient instructed to contact us if he has any worsening neurological symptoms upon

## 2020-06-11 ENCOUNTER — TELEPHONE (OUTPATIENT)
Dept: HEMATOLOGY/ONCOLOGY | Facility: HOSPITAL | Age: 55
End: 2020-06-11

## 2020-06-11 NOTE — TELEPHONE ENCOUNTER
[employer] will be delivering the Herceptin and they are asking if ok to send the 700 mg dose. Verbal ok given.

## 2020-06-22 ENCOUNTER — DIETICIAN VISIT (OUTPATIENT)
Dept: HEMATOLOGY/ONCOLOGY | Facility: HOSPITAL | Age: 55
End: 2020-06-22

## 2020-06-22 NOTE — PROGRESS NOTES
Nutrition screen complete as triggered by [facility] dx of metastatic colon cancer. Chart reviewed. Pt appears nutritionally stable at present. RD available on consult.

## 2020-06-23 ENCOUNTER — OFFICE VISIT (OUTPATIENT)
Dept: HEMATOLOGY/ONCOLOGY | Facility: HOSPITAL | Age: 55
End: 2020-06-23
Attending: INTERNAL MEDICINE
Payer: COMMERCIAL

## 2020-06-23 VITALS
BODY MASS INDEX: 29.87 KG/M2 | HEIGHT: 77.01 IN | DIASTOLIC BLOOD PRESSURE: 70 MMHG | OXYGEN SATURATION: 97 % | HEART RATE: 80 BPM | TEMPERATURE: 99 F | RESPIRATION RATE: 18 BRPM | WEIGHT: 253 LBS | SYSTOLIC BLOOD PRESSURE: 104 MMHG

## 2020-06-23 DIAGNOSIS — C78.02 SECONDARY ADENOCARCINOMA OF LEFT LUNG (HCC): ICD-10-CM

## 2020-06-23 DIAGNOSIS — C78.01 SECONDARY CARCINOMA OF RIGHT LUNG (HCC): ICD-10-CM

## 2020-06-23 DIAGNOSIS — C78.7 SECONDARY LIVER CANCER (HCC): ICD-10-CM

## 2020-06-23 DIAGNOSIS — C20 RECTAL CANCER (HCC): Primary | ICD-10-CM

## 2020-06-23 DIAGNOSIS — C20 RECTAL CANCER (HCC): ICD-10-CM

## 2020-06-23 DIAGNOSIS — C78.7 SECONDARY LIVER CANCER (HCC): Primary | ICD-10-CM

## 2020-06-23 PROCEDURE — 99214 OFFICE O/P EST MOD 30 MIN: CPT | Performed by: INTERNAL MEDICINE

## 2020-06-23 PROCEDURE — 82378 CARCINOEMBRYONIC ANTIGEN: CPT

## 2020-06-23 PROCEDURE — 96413 CHEMO IV INFUSION 1 HR: CPT

## 2020-06-23 PROCEDURE — 85025 COMPLETE CBC W/AUTO DIFF WBC: CPT

## 2020-06-23 PROCEDURE — 80053 COMPREHEN METABOLIC PANEL: CPT

## 2020-06-23 NOTE — PROGRESS NOTES
Patient is here for MD f/u and cycle 2 of Herceptin, Xeloda and Tucatinib. Xeloda is 2500 mg twice daily for 14 days on, then 7 days off and Tucatinib 300 mg twice daily continuous. Intermittent diarrhea but manageable.  No mouth sores but patient has notic

## 2020-06-24 NOTE — PROGRESS NOTES
[address]    PATIENT'S NAME: Kevan Peres   ATTENDING PHYSICIAN: Jono Lucero M.D.    PATIENT ACCOUNT #: [de-identified] LOCATION: [address] [place] [address] RECORD #: [id number] YOB: 1965   DATE OF SERVICE: 06/23/2020       CANCER ROSA 10 mg q.6 h. p.r.n.; Tucatinib 300 mg twice daily; and rivaroxaban 20 mg daily. PHYSICAL EXAMINATION:    GENERAL:  He is a well-developed, well-nourished male in no acute distress. VITAL SIGNS:  His performance status is 1.   His weight is 253 pound 03:44:27  [facility] [id number]  /    cc: ALEYDA Box M.D. Dr. Evette Reusing

## 2020-07-01 ENCOUNTER — HOSPITAL ENCOUNTER (OUTPATIENT)
Dept: MRI IMAGING | Facility: HOSPITAL | Age: 55
Discharge: HOME | End: 2020-07-01
Attending: INTERNAL MEDICINE
Payer: COMMERCIAL

## 2020-07-01 DIAGNOSIS — C79.31 SECONDARY MALIGNANT NEOPLASM OF BRAIN (HCC): ICD-10-CM

## 2020-07-01 PROCEDURE — A9575 INJ GADOTERATE MEGLUMI 0.1ML: HCPCS | Performed by: INTERNAL MEDICINE

## 2020-07-01 PROCEDURE — 70553 MRI BRAIN STEM W/O & W/DYE: CPT | Performed by: INTERNAL MEDICINE

## 2020-07-02 ENCOUNTER — HOSPITAL ENCOUNTER (OUTPATIENT)
Dept: RADIATION ONCOLOGY | Facility: HOSPITAL | Age: 55
Discharge: HOME | End: 2020-07-02
Attending: INTERNAL MEDICINE
Payer: COMMERCIAL

## 2020-07-02 VITALS
HEART RATE: 68 BPM | RESPIRATION RATE: 18 BRPM | SYSTOLIC BLOOD PRESSURE: 122 MMHG | BODY MASS INDEX: 30 KG/M2 | TEMPERATURE: 98 F | DIASTOLIC BLOOD PRESSURE: 81 MMHG | OXYGEN SATURATION: 100 % | WEIGHT: 251.81 LBS

## 2020-07-02 DIAGNOSIS — C79.31 BRAIN METASTASES (HCC): Primary | ICD-10-CM

## 2020-07-02 DIAGNOSIS — R26.81 GAIT INSTABILITY: ICD-10-CM

## 2020-07-02 PROCEDURE — 99213 OFFICE O/P EST LOW 20 MIN: CPT

## 2020-07-02 RX ORDER — DEXAMETHASONE 1 MG
TABLET ORAL
Qty: 30 TABLET | Refills: 1 | Status: SHIPPED | OUTPATIENT
Start: 2020-07-02 | End: 2020-01-01

## 2020-07-02 NOTE — ADDENDUM NOTE
Encounter addended by: Abhinav Wilkerson MD on: 7/2/2020 3:54 PM   Actions taken: Charge Capture section accepted

## 2020-07-02 NOTE — PATIENT INSTRUCTIONS
- CALL [phone] FOR A FOLLOW-UP WITH DR. GIBSON IN 6 weeks post-MRI.   -Dr Wallis Felty has prescribed a course of steroids for you to take as directed:   Dexamethasone 1mg daily x2 weeks   Dexamethasone 0.5mg daily x2 weeks   THEN STOP  - CALL [phone]

## 2020-07-02 NOTE — PROGRESS NOTES
[facility]  Radiation Oncology Follow-up    Patient Name: Mecca Yo   MRN: [id number]  Referring Physician: Bianca Fay MD    Diagnosis:  metastatic HER2+ rectal cancer with CNS involvement     Prior Radiation Therapy Rendered  1. TABLET (20 MG TOTAL) BY MOUTH DAILY WITH FOOD.  90 tablet 3       Physical Exam   07/02/20  1143   BP: 122/81   Pulse: 68   Resp: 18   Temp: 98.2 °F (36.8 °C)     KPS: 90    Gen: NAD  HEENT: NCAT, alopecia over posterior scalp   Neck: no LAD  CV: RRR  Lungs

## 2020-07-02 NOTE — PROGRESS NOTES
Nursing Follow-Up Note    Patient: Toan Leiva  YOB: 1965  Age: 50year old  Radiation Oncologist: Dr. Curt Omer  Referring Physician: Radha Bower  Chief Complaint: Patient presents with:   Follow - Up    Date: 7/2/2020    Toxicit

## 2020-07-07 NOTE — PROGRESS NOTES
[facility]    PATIENT'S NAME: Aureliano Amezcua   ATTENDING PHYSICIAN: Alexia Fagan M.D.    PATIENT ACCOUNT #: [de-identified] LOCATION: [address] [place] [address] RECORD #: [id number] YOB: 1965   DATE OF SERVICE: 06/02/2020       CANCER ROSA Xarelto 20 mg daily. PHYSICAL EXAMINATION:    GENERAL:  He is a relatively well-appearing male in no acute distress. There is minimal unsteadiness on his feet with walking. VITAL SIGNS:  His performance status is 1. His weight is 250 pounds.  Blood p

## 2020-07-08 NOTE — ADDENDUM NOTE
Encounter addended by: Pedrito Brandon MD on: 7/8/2020 8:12 AM   Actions taken: Order list changed, Diagnosis association updated

## 2020-07-14 ENCOUNTER — OFFICE VISIT (OUTPATIENT)
Dept: HEMATOLOGY/ONCOLOGY | Facility: HOSPITAL | Age: 55
End: 2020-07-14
Attending: INTERNAL MEDICINE
Payer: COMMERCIAL

## 2020-07-14 VITALS
SYSTOLIC BLOOD PRESSURE: 110 MMHG | HEIGHT: 77.01 IN | HEART RATE: 97 BPM | DIASTOLIC BLOOD PRESSURE: 69 MMHG | OXYGEN SATURATION: 97 % | RESPIRATION RATE: 18 BRPM | BODY MASS INDEX: 29.87 KG/M2 | WEIGHT: 253 LBS | TEMPERATURE: 98 F

## 2020-07-14 DIAGNOSIS — C78.02 SECONDARY ADENOCARCINOMA OF LEFT LUNG (HCC): ICD-10-CM

## 2020-07-14 DIAGNOSIS — C20 RECTAL CANCER (HCC): ICD-10-CM

## 2020-07-14 DIAGNOSIS — C78.01 SECONDARY CARCINOMA OF RIGHT LUNG (HCC): ICD-10-CM

## 2020-07-14 DIAGNOSIS — C79.51 SECONDARY CANCER OF BONE (HCC): ICD-10-CM

## 2020-07-14 DIAGNOSIS — C78.7 SECONDARY LIVER CANCER (HCC): Primary | ICD-10-CM

## 2020-07-14 DIAGNOSIS — C79.31 SECONDARY CANCER OF BRAIN (HCC): ICD-10-CM

## 2020-07-14 LAB
ALBUMIN SERPL-MCNC: 3.5 G/DL (ref 3.4–5)
ALBUMIN/GLOB SERPL: 1.1 {RATIO} (ref 1–2)
ALP LIVER SERPL-CCNC: 118 U/L (ref 45–117)
ALT SERPL-CCNC: 32 U/L (ref 16–61)
ANION GAP SERPL CALC-SCNC: 2 MMOL/L (ref 0–18)
AST SERPL-CCNC: 23 U/L (ref 15–37)
BASOPHILS # BLD AUTO: 0.03 X10(3) UL (ref 0–0.2)
BASOPHILS NFR BLD AUTO: 0.4 %
BILIRUB SERPL-MCNC: 1.6 MG/DL (ref 0.1–2)
BUN BLD-MCNC: 19 MG/DL (ref 7–18)
BUN/CREAT SERPL: 13.3 (ref 10–20)
CALCIUM BLD-MCNC: 8.4 MG/DL (ref 8.5–10.1)
CEA SERPL-MCNC: 6286.1 NG/ML (ref ?–5)
CHLORIDE SERPL-SCNC: 107 MMOL/L (ref 98–112)
CO2 SERPL-SCNC: 29 MMOL/L (ref 21–32)
CREAT BLD-MCNC: 1.43 MG/DL (ref 0.7–1.3)
DEPRECATED RDW RBC AUTO: 78.4 FL (ref 35.1–46.3)
EOSINOPHIL # BLD AUTO: 0.06 X10(3) UL (ref 0–0.7)
EOSINOPHIL NFR BLD AUTO: 0.7 %
ERYTHROCYTE [DISTWIDTH] IN BLOOD BY AUTOMATED COUNT: 23 % (ref 11–15)
GLOBULIN PLAS-MCNC: 3.3 G/DL (ref 2.8–4.4)
GLUCOSE BLD-MCNC: 113 MG/DL (ref 70–99)
HCT VFR BLD AUTO: 38.1 % (ref 39–53)
HGB BLD-MCNC: 12.3 G/DL (ref 13–17.5)
IMM GRANULOCYTES # BLD AUTO: 0.18 X10(3) UL (ref 0–1)
IMM GRANULOCYTES NFR BLD: 2.2 %
LYMPHOCYTES # BLD AUTO: 1.38 X10(3) UL (ref 1–4)
LYMPHOCYTES NFR BLD AUTO: 16.7 %
M PROTEIN MFR SERPL ELPH: 6.8 G/DL (ref 6.4–8.2)
MCH RBC QN AUTO: 31.5 PG (ref 26–34)
MCHC RBC AUTO-ENTMCNC: 32.3 G/DL (ref 31–37)
MCV RBC AUTO: 97.7 FL (ref 80–100)
MONOCYTES # BLD AUTO: 0.86 X10(3) UL (ref 0.1–1)
MONOCYTES NFR BLD AUTO: 10.4 %
NEUTROPHILS # BLD AUTO: 5.73 X10 (3) UL (ref 1.5–7.7)
NEUTROPHILS # BLD AUTO: 5.73 X10(3) UL (ref 1.5–7.7)
NEUTROPHILS NFR BLD AUTO: 69.6 %
OSMOLALITY SERPL CALC.SUM OF ELEC: 289 MOSM/KG (ref 275–295)
PATIENT FASTING Y/N/NP: NO
PLATELET # BLD AUTO: 173 10(3)UL (ref 150–450)
PLATELET MORPHOLOGY: NORMAL
POTASSIUM SERPL-SCNC: 3.6 MMOL/L (ref 3.5–5.1)
RBC # BLD AUTO: 3.9 X10(6)UL (ref 4.3–5.7)
SODIUM SERPL-SCNC: 138 MMOL/L (ref 136–145)
WBC # BLD AUTO: 8.2 X10(3) UL (ref 4–11)

## 2020-07-14 PROCEDURE — 99214 OFFICE O/P EST MOD 30 MIN: CPT | Performed by: INTERNAL MEDICINE

## 2020-07-14 PROCEDURE — 82378 CARCINOEMBRYONIC ANTIGEN: CPT

## 2020-07-14 PROCEDURE — 80053 COMPREHEN METABOLIC PANEL: CPT

## 2020-07-14 PROCEDURE — 85025 COMPLETE CBC W/AUTO DIFF WBC: CPT

## 2020-07-14 PROCEDURE — 96413 CHEMO IV INFUSION 1 HR: CPT

## 2020-07-14 NOTE — PROGRESS NOTES
Patient is here for MD f/u for rectal cancer. Patient reports ongoing neuropathy in both hands and feet. Stools vary from loose to solid. Patient is on back on Dexamethasone 1 mg. His gait, energy and appetite has improved.  Patient will be starting physica

## 2020-07-14 NOTE — PROGRESS NOTES
Pt here for C3D1 of Herceptine.   Arrives Ambulating independently, accompanied by Self           Patient reports possible pregnancy since last therapy cycle: No    Modifications in dose or schedule: No     Frequency of blood return and site check throughou

## 2020-07-15 NOTE — PROGRESS NOTES
[facility]    PATIENT'S NAME: Manjeet Zamudio   ATTENDING PHYSICIAN: Obdulio Hay M.D.    PATIENT ACCOUNT #: [de-identified] LOCATION: [address] [place] [address] RECORD #: [id number] YOB: 1965   DATE OF SERVICE: 07/14/2020       CANCER ROSA adenopathy. LUNGS:  Resonant to percussion and clear to auscultation. No wheezing, rales, or rhonchi. HEART:  Regular S1 and S2 with no murmur or gallop. ABDOMEN:  No hepatosplenomegaly or tenderness. EXTREMITIES:  No clubbing, cyanosis, or edema.

## 2020-07-28 NOTE — PROGRESS NOTES
NEUROLOGICAL EVALUATION:   Referring Physician: Dr. Neisha Quiñonez  Diagnosis: Brain metastases [facility]) (C79.31)  Gait instability (R26.81)      Date of Service: 7/28/2020     PATIENT Janay Ramsay is a 50year old male who presents to therapy today wi remeasured in a similar fashion on the previous exam.    There is decreased mild surrounding vasogenic edema.   3. A smaller metastatic focus along the paramedian left parietal lobe measuring 6 x 5 x 6 mm previously measured 8 x 8 x 8 mm when remeasured in measures show reduced static and dynamic balance especially with visual challenges; impaired Coordination on L lower and UE; mild L hip weakness; gait deviations to include WBOS; decreased postural control.   Functional deficits include but are not limited time from heel strike to midstance. Functional Gait Assessment   Item Description Score (0 worst, 3 best) 20/30 (fall risk if 22/30 or less)     ____3___1. Gait Level Surface-  Time: ____6.0______seconds  ___3___2. Change in gait speed  _____1__3.  Gait outdoors  · Pt will improve FGA score to >22 to demonstrate decreased fall risk. · Pt will improve global hip strength to 4+/5 or greater to improve stability with single leg phase of gait and on uneven surfaces.    · Pt will be independent and compliant

## 2020-07-30 NOTE — PROGRESS NOTES
Dx: [facility] (C79.31)  Gait instability (R26.81)         Insurance (Authorized # of Visits):  PPO, 10 per POC            Authorizing Physician: Dr. Jane Johansen Next MD visit: none scheduled  Fall Risk: standard         Precautions: n/a significantly greater instances LOB. Educated on safety with walking on crass/cub/up hill/driveway and focus on task not chatting etc. Verbalizes understanding.      HEP:   Access Code: [email or web address]             Gastroc Stretch on Wall - 2 sets - 30 hold - 2x da

## 2020-08-04 NOTE — PROGRESS NOTES
D[facility]: [facility] (C79.31)  Gait instability (R26.81)         Insurance (Authorized # of Visits):  PPO, 10 per POC            Authorizing Physician: Dr. Nadeen Jin Next MD visit: none scheduled  Fall Risk: standard         Precautions: n/a reps with SL cone taps. Re performed modified CTSIB to compare to evaluation due to stopping steroids, patient about at the same level that this time, but with increased SLS especially on R.  Plan to progress into more dynamic training next visit with sheela random  Step up/down on foam 4'  LSU on foam 4' ea way  Standing ball pass for weight shift and correction wide rhomberg   STS on foam 2 x 8, no UE, sitting on foam  Perturbations in wide semi tandem 1' x 2 ea EC      TE:   Calf stretch 2 x 30s  Soleus str

## 2020-08-06 NOTE — PROGRESS NOTES
Dx: Brain [facility] (C79.31)  Gait instability (R26.81)         Insurance (Authorized # of Visits):  PPO, 10 per POC            Authorizing Physician: Dr. Lona Pendleton Next MD visit: none scheduled  Fall Risk: standard         Precautions: n/a Progressed from standing WS to step to [facility] towards cones varying angles forward and retro, intermittent use UE on bar and close SBA for safety. Work for [facility] on [employer]. Great focus required but able to perform without touching assist or on // bar.  [place] diagonal ea 20 x 2  ST x 10 ea diagonal HT  SL cone taps, up to 3 taps   Slow karaoke // bar no UE x 3 ea way  Retro walk x 6 distance // bar , with \"stops\" random  Step up/down on foam 4'  LSU on foam 4' ea way  Standing ball pass for weight shift and c

## 2020-08-06 NOTE — PROGRESS NOTES
Pt here for C4D1.   Arrives Ambulating independently, accompanied by Self           Patient reports possible pregnancy since last therapy cycle: Not applicable    Modifications in dose or schedule: No     Frequency of blood return and site check throughout

## 2020-08-06 NOTE — PROGRESS NOTES
Patient is here for MD f/u for rectal cancer. Patient reports ongoing neuropathy in both hands and feet. Stools vary from loose to solid. Completed steroids Monday. Energy level and appetite good.    Education Record     Learner:  Patient     Disease / Abrahan Goldsmith

## 2020-08-07 NOTE — PROGRESS NOTES
[address] [place]    PATIENT'S NAME: Toya Hickman   ATTENDING PHYSICIAN: Ludy Gomes M.D.    PATIENT ACCOUNT #: [de-identified] LOCATION: [address] [place] [address] RECORD #: [id number] YOB: 1965   DATE OF SERVICE: 08/06/2020       CANCER ROSA capecitabine 2500 mg b.i.d. 14 days on and 7 days off; dexamethasone, which is on hold; ondansetron 8 mg q.8 h. p.r.n.; prochlorperazine 10 mg q.6 h. p.r.n.; tucatinib 300 mg b.i.d.; Xarelto 20 mg daily.     PHYSICAL EXAMINATION:    GENERAL:  He is a [facility] [id number]  /    cc: ALEYDA Rodríguez M.D. Dr. Lorilee Florence

## 2020-08-10 NOTE — PROGRESS NOTES
Dx: [facility] (C79.31)  Gait instability (R26.81)         Insurance (Authorized # of Visits):  PPO, 10 per POC            Authorizing Physician: Dr. Lona Pendleton Next MD visit: none scheduled  Fall Risk: standard         Precautions: n/a well as with unstable surfaces. Will continue to progress to improve safety with ambulation and decrease fall risk.      HEP:   Access Code: [email or web address]             Gastroc Stretch on Wall - 2 sets - 30 hold - 2x daily - 7x weekly  Soleus Stretch on Wall - 2 s rhomberg   STS on foam 2 x 8, no UE, sitting on foam  Perturbations in wide semi tandem 1' x 2 ea EC NM:  Slow karaoke // bar no UE x 3 ea way  Retro walk x 6 distance // bar   Lateral hurdles x 3 ea  Fwd hurdles x 6 x 6  Fwd 1, retro 1 x 4   SL cone taps, Family

## 2020-08-13 NOTE — PROGRESS NOTES
D[facility]: [facility] (C79.31)  Gait instability (R26.81)         Insurance (Authorized # of Visits):  PPO, 10 per POC            Authorizing Physician: Dr. Pricilla Valladares Next MD visit: none scheduled  Fall Risk: standard         Precautions: n/a anterior WS, but improves with balance holds within session. Able to progress to head turns in tandem stance for home. And tandem walking increased to max 8 steps from 3 steps at evaluation. More difficulty when walking with L head turn compared to R.  Able ea diagonal HT  SL cone taps, up to 3 taps   Slow karaoke // bar no UE x 3 ea way  Retro walk x 6 distance // bar , with \"stops\" random  Step up/down on foam 4'  LSU on foam 4' ea way  Standing ball pass for weight shift and correction wide [facility]   ST x 30s       Charges:  NM x 3     Total Timed Treatment: 43 min  Total Treatment Time: 43 min

## 2020-08-14 NOTE — PATIENT INSTRUCTIONS
- CALL [phone] FOR A FOLLOW-UP WITH DR. GIBSON IN 6 WEEKS AFTER BRAIN MRI  - CALL CENTRAL SCHEDULING AT [phone] TO SCHEDULE YOUR NEXT MRI OF BRAIN  - CALL THE NURSES' LINE AT [phone] IF YOU HAVE ANY QUESTIONS/CONCERNS REGARDING RADIATI

## 2020-08-14 NOTE — PROGRESS NOTES
Nursing Follow-Up Note    Patient: Anh Johnson  YOB: 1965  Age: 50year old  Radiation Oncologist: Dr. Nidhi Elias  Referring Physician: Nicole Sanchez  Chief Complaint: METASTATIC COLON CANCER  Date: 8/14/2020    Toxicities: N/A

## 2020-08-17 NOTE — PROGRESS NOTES
[facility]  Rad[facility] Oncology Follow[facility]    Patient Name: Abisai Mauro   MRN: [id number]  Referring Physician: Kyleigh Oconnor MD    Diagnosis:  metastatic HER2+ rectal cancer with CNS involvement     Prior Radiation Therapy Rendered  1. 1       Physical Exam   08/14/20  0940   BP: 123/81   Pulse: 79   Resp: 16   Temp: 97.9 °F (36.6 °C)     KPS: 90    Gen: NAD  HEENT: NCAT  Neck: no LAD  CV: RRR  Lungs: CTAB  Ext: wwp, no cyanosis  Neuro: CN II-XII intact, strength 5/5 throughout, mild lisa

## 2020-08-18 NOTE — PROGRESS NOTES
Dx: [facility] (C79.31)  Gait instability (R26.81)         Insurance (Authorized # of Visits):  PPO, 10 per POC            Authorizing Physician: Dr. Pepper Sykes Next MD visit: none scheduled  Fall Risk: standard         Precautions: n/a patient able to maintain independently. Patient motivated and diligent with HEP. Continued work on anterior [employer], improved ability on [employer] this date.      HEP:   Access Code: [email or web address]             Gastroc Stretch on Wall - 2 sets - 30 hold - 2x daily ball pass one leg propped 8 inch step  Walks with small head turns focus on ball in // bars, slight deviation, x 4 laps // bar   Tandem walks 12' x 4 with touch on // bar as needed , max multiple times 6  Alt march 5 ea on sand dune x 3  Stepping towards c

## 2020-08-19 NOTE — PROGRESS NOTES
Discharge Summary  Dx: Brain metastases ([facility] (C79.31)  Gait instability (R26.81)         Insurance (Authorized # of Visits):  PPO, 10 per POC            Authorizing Physician: Dr. Phillip Reyes Next MD visit: none scheduled  Fall Risk: standard activities. Tandem walk now to greater than 7 steps and was 3 at evaluation. Mi General still has difficulty with quick head turns when out on a walk and with further visual challenges such as eyes closed and more difficulty with NBOS activities.  In session with uneven surfaces. MET  · Pt will be independent and compliant with HEP. MET       Plan: Patient agreeable to DC this date and continuing HEP. All patient questions answered.       Patient/Family/Caregiver was advised of these findings, precautions, and rosemary (increased difficulty with L head turn)  \"quiet\" AP WS on balance board x 1' x 2  Balance on rocker board trials x 5' working on anterior weight shift   Tandem walk trial, max 8   Walks with HT vertical x 2'  Lateral hurdles x 3 foam   Lateral step over

## 2020-08-27 NOTE — PROGRESS NOTES
Patient is here for MD f/u and cycle 5 of Herceptin. Patient states he is feeling more lethargic and lack of motivation. Denies cough or SOB. No headaches or visual changes. Patient has had several episodes of vomiting. Pain on the bottom of both feet.  Int

## 2020-08-27 NOTE — PROGRESS NOTES
Pt here for C5D1.   Arrives Ambulating independently, accompanied by Self          Modifications in dose or schedule: No    Pt denies current pregnancy     Frequency of blood return and site check throughout administration: Prior to administration   Dischar

## 2020-09-01 NOTE — PROGRESS NOTES
[facility]    PATIENT'S NAME: Cary Senior   ATTENDING PHYSICIAN: Bianca Fay M.D.    PATIENT ACCOUNT #: [de-identified] LOCATION: [address] [place] [address] RECORD #: [id number] YOB: 1965   DATE OF SERVICE: 08/27/2020       CANCER ROSA hands and feet; Xarelto 20 mg daily. PHYSICAL EXAMINATION:    GENERAL:  He is a well-appearing male. He is in no acute distress. VITAL SIGNS:  Performance status is 1.   Weight 264 pounds, blood pressure 126/74, pulse 103, respiratory rate 20, temperat 05:46:09  [facility] [id number]  /    cc: ALEYDA Ng M.D. Dr. Murvin Roman

## 2020-09-07 NOTE — PROGRESS NOTES
DONALD met with patient who had a few requests concerning obtaining a handicap placard, obtaining advance directive paperwork and confirming that [employer] had contacted us about LTD.  Donald printed off all requested advance directive paperwork, completed
Private Residence

## 2020-09-10 NOTE — PATIENT INSTRUCTIONS
Nicole Yousif is a 48year old male here for continued care following his rectal and colon cancer.       His presenting history is below:     This patient's history starts with a colonoscopy for screening purposes on August 9, 2018. [facility] was found to Haidered Hemanth the clinical trial secondary to this new brain metastasis. He has begun new antibiotic infusions and chemotherapy treatment with Dr. Hema Paul. He had responded well with a CEA dropping from [id number], however his most recent CEA jumped up to 4679.   He ha

## 2020-09-10 NOTE — PROGRESS NOTES
Follow Up Visit Note       Active Problems      1. Rectal cancer (HCC) at 15 cm between the second and third rectal folds    2. Cancer of descending colon ([facility] approximately 40 cm, within a large adenomatous polyp    3. Secondary liver cancer ([facility]    4. last CT of the chest, abdomen and pelvis was on January 27, 2020.  This demonstrated minimal increase in metastatic lung nodules, and mild increase in liver metastasis.   The patient's most recent CEA was on November 7, 2019 and had increased to 331.   The scribed by the PA  I performed my own physical exam and obtained the history as detailed above.   I have made all appropriate changes in documentation as necessary  I attest to the accuracy of this note as detailed above    Celia Aguilera MD FACS FASCRS Yes        Frequency: Never        Comment: 1 beer per day      Drug use: No      Sexual activity: Yes        Partners: Female    Other Topics      Concerns:    Social History Narrative      , lives with wife      Has sons, 22 () and 25 (yovany and urgency. Musculoskeletal: Negative for arthralgias and myalgias. Skin: Negative for color change and rash. Neurological: Negative for tremors, syncope and weakness. Hematological: Negative for adenopathy. Does not bruise/bleed easily.    Psychia is 28.58 kg/m². Musculoskeletal: Normal range of motion. Lymphadenopathy:     He has no cervical adenopathy. Neurological: He is alert and oriented to person, place, and time. Skin: Skin is warm and dry. No rash noted. He is not diaphoretic.    Psy metastatic lung nodules, and mild increase in liver metastasis.   The patient's most recent CEA was on November 7, 2019 and had increased to 331.   The patient's most recent colonoscopy was performed by myself on April 11, 2019 prior to the takedown of his index is 28.58 kg/m². There does not appear to be any local recurrence of the cancer within the colon or rectum. The patient does have a CT scan scheduled within the next week.   I will continue to see him on an every 3-month basis at this time for con

## 2020-09-14 NOTE — PROGRESS NOTES
Nutrition re-screen complete as triggered by [employer] dx of Metastatic HER2-positive colon cancer. Chart reviewed. Pt appears nutritionally stable at present. RD available on consult.

## 2020-09-17 NOTE — PROGRESS NOTES
Patient is here for MD f/u and cycle 6 of Herceptin. Patient c/o infected toenails. Green drainage from both big toes and some blood as well. Mild left ankle swelling. + neuropathy in both feet. Xeloda dose changed to 2000 mg after last office visit.  Eliseo

## 2020-09-17 NOTE — PROGRESS NOTES
Pt here for C6D1.   Arrives Ambulating independently, accompanied by Self           Patient reports possible pregnancy since last therapy cycle: Not Applicable    Modifications in dose or schedule: No     Frequency of blood return and site check throughout

## 2020-09-17 NOTE — TELEPHONE ENCOUNTER
Ether Sonny, MD Jerry Fitzgerald, RN; TYLER Grider Rns; JOHN Hunt             Went up. [place] needs labs one week prior to the next visit with a CEA      Pt informed and transferred to [facility] to schedule.

## 2020-09-21 NOTE — PROGRESS NOTES
[facility]    PATIENT'S NAME: Real Carter   ATTENDING PHYSICIAN: Ketty Silva M.D.    PATIENT ACCOUNT #: [de-identified] LOCATION: [address] [place] [address] RECORD #: [id number] YOB: 1965   DATE OF SERVICE: 09/17/2020       CANCER ROSA continue to do Epsom salt soaks and then put Betadine on the areas. I am adding cephalexin for him for 1 week to try to clear this up. PHYSICAL EXAMINATION:    GENERAL:  He is a well-appearing male in no acute distress.    VITAL SIGNS:  His performance with his treatment today, but I am asking him to get another CEA done in about 2 weeks and if the numbers are climbing, we will have to begin to consider the next alternative.   He has never received Enhertu, and this would certainly be a possibility, tc

## 2020-09-22 NOTE — TELEPHONE ENCOUNTER
TC to patient    MRI shows marginal growth of vermis tumor and more edema  He is more ataxic as well  On dex 1 mg  This lesion got 30 Gy in 10 fx in Mar 2020    The small met that got SRS is stable  No new mets    Case discussed in brain tumor board with

## 2020-09-23 NOTE — PROGRESS NOTES
Nursing Re- Consult Note    Patient: Yessenia Trent  YOB: 1965  Age: 50year old  Angel Cavanaugh MD  Referring Physician: Alex Abdul  Diagnosis: Metastatic Rectal Ca  Consult Date: 9/23/2020    History

## 2020-09-24 NOTE — PATIENT INSTRUCTIONS
-Dr Joy Fish would like you to meet with a neurosurgeon next Tuesday 9/29 at the [facility] to discuss possible surgery-you will be contacted with a specific time    -Your radiation planning scan has been rescheduled to Wednesday 9/30 at 9:00 am-zenaida

## 2020-09-24 NOTE — PROGRESS NOTES
[facility]     PATIENT:   José Luis Platt      6/4/1965    DIAGNOSIS:   Metastatic colon cancer w/ progressing cerebellar metastasis    CANCER HISTORY   51 yo man s/p LAR for colon CA in Oct 2018.    Liver and lung mets Wednesday in the event he does not pursue surgery.     Timothy Diaz MD  Radiation Oncology    CC: Kristian Flick, MD Sharrie Councilman, MD

## 2020-09-29 NOTE — H&P
Neurosurgery Clinic Visit  2020    Giulia Kidd PCP:  Gillian Hernandes MD    1965 MRN [id number]           REASON FOR VISIT: Hx of colon cancer with metastases to the brain      HISTORY OF PRESENT ILLNESS:Rehan Ba Jorgito is a 50year old mal left lower [facility] 12[facility]31/2018   • Cancer ([facility] 10/12/2018    Rectal Cancer   • High cholesterol     borderline   • Visual impairment     glasses/contacts     Past Surgical History:   Procedure Laterality Date   • COLECTOMY  10/12/2018   • Lucie Pack patent. The gray-white matter differentiation is intact. The craniocervical junction is unremarkable.       Minimal interval increase in size of a metastatic focus centered along the paramedian cerebellar hemispheres and cerebellar vermis measuring approx noted as well. - Ordered today:    -   3. Follow up in *** or call or follow up sooner or go to the ED for any new, worsening or concerning signs or symptoms      Mariusz Patton, [address] [facility]  Neurological Surgery    Co-Director  Edw

## 2020-09-29 NOTE — PROGRESS NOTES
[facility]     PATIENT:   Priti Platt      6/4/1965    DIAGNOSIS:   Metastatic colon cancer w/ progressing cerebellar metastasis    CANCER HISTORY   51 yo man s/p LAR for colon CA in Oct 2018.    Liver and lung mets

## 2020-10-07 NOTE — PATIENT INSTRUCTIONS
POST-RADIATION INSTRUCTIONS:   - PLEASE STOP AT  TO MAKE A FOLLOW UP APPOINTMENT WITH DR Rolly Au WOULD LIKE TO SEE YOU IN THE NEURO CLINIC ON Tuesday November 10     -PLEASE CALL CENTRAL SCHEDULING [phone] TO SET UP YOUR NEXT MRI-DR Dany Fleming W

## 2020-10-07 NOTE — PROGRESS NOTES
[facility] Radiation Treatment Management Note 1-5    Patient:  Nicole Yousif  Age:  50year old  Visit Diagnosis:    1.  Brain metastases ([address]      Primary Rad/Onc:  Dr. Jf Starks    Site Delivered Dose (cGy) Prescribed Dose (cG

## 2020-10-08 NOTE — PROGRESS NOTES
Patient is here for MD f/u and cycle 7 of Herceptin/Tucatinib/Xeloda. Patient started brain radiation yesterday. He will get 3 total fractions. C/o unsteady gait. No headaches, no nausea or vomiting.  Currently on Dexamethasone 6 mg today and tomorrow then

## 2020-10-09 NOTE — TELEPHONE ENCOUNTER
Received a fax from [facility] indicating that Dexamethasone 1 mg tablets that Dr Bettina Leon ordered needed a PA. Attempted a PA but it was rejected.  Per Dr Beth Del Cid, informed patient to go on [address] and to find the cheapest cost in the area and pay out of po

## 2020-10-13 NOTE — PROGRESS NOTES
[address] [place]    PATIENT'S NAME: Ludy Andrade   ATTENDING PHYSICIAN: Sami Shea M.D.    PATIENT ACCOUNT #: [de-identified] LOCATION: [address] [place] [address] RECORD #: [id number] YOB: 1965   DATE OF SERVICE: 10/08/2020       CANCER ROSA well-appearing male. He is in no acute distress. He does have a slightly broad-based gait. VITAL SIGNS:  His performance status is 1. His weight is 277 pounds. Blood pressure is 141/92, pulse 97, respiratory rate is 20, temperature is 96.9.     HEENT

## 2020-10-15 NOTE — TELEPHONE ENCOUNTER
Lamont Bentley called to refill his medication. He says the pharmacy is telling him he can't have it filled until November.

## 2020-10-15 NOTE — TELEPHONE ENCOUNTER
From: Priti Platt  To: Norberto Kaur MD  Sent: 10/15/2020 2:17 PM CDT  Subject: Non-Urgent Medical Question    Dr. Miguel Alegre    I still have not heard anything regarding the therapy you recommended.     Should I be following up with someone regarding th

## 2020-10-19 NOTE — TELEPHONE ENCOUNTER
Provider message noted and placed in Dr. Jasbir Moe folder. Reminder placed in nursing tab to address on 10/22/20.

## 2020-10-23 NOTE — TELEPHONE ENCOUNTER
From: Loraine Platt  To: Martha Orellana MD  Sent: 10/22/2020 9:57 PM CDT  Subject: Non-Urgent Medical Question    Do I need, or have, an order for the blood draw on Monday? My Echo is scheduled for 9:30 am. I can do beforehand or after.      Thank

## 2020-10-26 NOTE — PROGRESS NOTES
PIV left in place from CT scan- pt asking to have removed since labs drawn peripherally. Removed. Pt departed stable.

## 2020-10-29 NOTE — TELEPHONE ENCOUNTER
Patient saw his oncologist today. He was requesting an order for vestibular therapy for his dizziness that Dr. Reema Davis discussed with him at his last appointment.     Orders were placed for vestibular therapy    My chart message sent to patient

## 2020-10-29 NOTE — PROGRESS NOTES
Patient is here for MD f/u and cycle 8 of Trastuzumab. Patient is also on Xeloda 2000 mg bid x 14 and Tucatinib. Patient c/o diarrhea during his off week of Xeloda. He is still experiencing episodes of dizziness.  [facility]

## 2020-10-29 NOTE — PROGRESS NOTES
Pt here for C8D1.   Arrives Ambulating independently, accompanied by Self          Modifications in dose or schedule: No    Pt denies current pregnancy     Frequency of blood return and site check throughout administration: Prior to administration   Dischar

## 2020-11-02 NOTE — PROGRESS NOTES
[address] [place]    PATIENT'S NAME: Mj Garza   ATTENDING PHYSICIAN: Jenni Cardenas M.D.    PATIENT ACCOUNT #: [de-identified] LOCATION: [address] [place] [address] RECORD #: [id number] YOB: 1965   DATE OF SERVICE: 10/29/2020       CANCER ROSA 2000 mg b.i.d. for 14 days on and 7 days off. He continues to take the tucatinib on a daily basis. His last echocardiogram was just done a couple of days ago and shows well-maintained cardiac function.   He has ongoing fatigue and he is unsteady, though h fairly well. He continues with the trastuzumab infusion today. His next will be done in 3 weeks.   I will wait to hear the results of the MRI from the neuro/oncology team and for now, I am not making any particular changes other than very gradually weanin

## 2020-11-09 NOTE — PROGRESS NOTES
[facility]  FOLLOW UP     PATIENT:   Kaila Platt      6/4/1965    DIAGNOSIS:   Metastatic colon CA      CANCER HISTORY   49 yo man s/p LAR for colon CA in Oct 2018  Liver and lung mets since late 2018  Received 30 Gy in 10 fx MD

## 2020-11-10 NOTE — PROGRESS NOTES
Neurosurgery Clinic Visit  11/10/2020    Gabrielle Lawton PCP:  Cesar Barahona MD    1965 MRN [id number]           REASON FOR VISIT: Hx of colon cancer with metastases to the brain, s/p radiation, follow up and imaging review      HISTORY OF PRESENT Disp: 90 tablet, Rfl: 3    No current facility-administered medications on file prior to visit.      Past Medical History:   Diagnosis Date   • Acute deep vein thrombosis (DVT) of popliteal vein of left lower extremity ([facility] 12/31/2018   • Cancer ([facility] 10/1 weighted images without and with infusion. PATIENT STATED HISTORY:(As transcribed by Technologist)  Metastatic rectal cancer to brain with no worsening or new symptoms.       CONTRAST USED:  30 mL of Dotarem     FINDINGS:  The ventricles and sulci are w intracranial abnormality identified. 2. Mild decrease in size of the 3 cm metastatic focus along the paramedian cerebellar hemispheres with mildly decreased surrounding vasogenic edema as above.   The enhancing mass demonstrates tumoral hypoperfusion hill

## 2020-11-18 NOTE — PROGRESS NOTES
INITIAL EVALUATION:   Referring:  Robbin Cortez PA-C/  Diagnosis:   Gait instability   Unsteadiness       Date of Service: 11/18/2020     PATIENT SUMMARY    Danilo Luong is a 50year old male; currently on medical disability; worked in human resou support  AROM/overpressure:   -Active motion screen of the cervical spine, upper extremities, lower extremities grossly intact  Special tests:    -Modified Clinical Test of Sensory Interaction in Balance (MCTSIB)  Eyes Open, Firm Surface Time 30 seconds/30 inputs relative to UE support. Encouraged current walking program.  Discussed management from the perspective of the physical therapists with respect to concepts of gaze stabilization, habitation, adaptation.   Discussed use of 'grounding\" as symptom modu pending response  -Progress to habitation activities  -Upper quarter, lower quarter, trunk strengthening  -Home program instruction and progression  -Ongoing assessment and modification of intervention pending response   Education or treatment limitation:

## 2020-11-19 NOTE — PROGRESS NOTES
Pt here for C9D1.   Arrives Ambulating independently, accompanied by Self           Patient reports possible pregnancy since last therapy cycle: Not Applicable    Modifications in dose or schedule: No     Frequency of blood return and site check throughout

## 2020-11-19 NOTE — PROGRESS NOTES
Patient is here for MD f/u and cycle 9 of Herceptin. Patient had a PT eval yesterday for vestibular therapy to improve gait and balance. Appetite is good. Mild fatigue but doing ADLs. Ongoing issues with left leg swelling and tingling.  Dexamethasone dose i

## 2020-11-20 NOTE — PROGRESS NOTES
[facility]    PATIENT'S NAME: Mitch Justice   ATTENDING PHYSICIAN: Lew Avila M.D.    PATIENT ACCOUNT #: [de-identified] LOCATION: [address] [place] [address] RECORD #: [id number] YOB: 1965   DATE OF SERVICE: 11/19/2020       CANCER CENTER SIGNS:  His performance status is 1. His weight is 284 pounds. Blood pressure is 130/89, pulse 107, respiratory rate 20, temperature 97.5. HEENT:  Otherwise unremarkable. He has a conjugate gaze. There are no cranial nerve abnormalities.   He has no or

## 2020-12-01 NOTE — PROGRESS NOTES
Dx:       Gait instability   Unsteadiness        Authorized # of Visits:  No prior authorization is required per appointment notes       Next MD visit: none scheduled  Fall Risk: standard           Precautions: n/a             Subjective: No new issues; fe

## 2020-12-01 NOTE — PROGRESS NOTES
Dx:       Gait instability   Unsteadiness        Authorized # of Visits:  No prior authorization is required per appointment notes       Next MD visit: none scheduled  Fall Risk: standard           Precautions: n/a             Subjective:  Yesterday on wal surface                         Charges:   Neuromuscular re-education x 3      Total Timed Treatment: 40 min    Total Treatment Time: 40 min

## 2020-12-01 NOTE — PROGRESS NOTES
Dx:       Gait instability   Unsteadiness        Authorized # of Visits:  No prior authorization is required per appointment notes       Next MD visit: none scheduled  Fall Risk: standard           Precautions: n/a             Subjective:  No new issues; t stepping (solid surface)    -Gaze shifting while stepping on/off airex pad forward and lateral -Gaze shifting while stepping on/off airex pad forward and lateral    -Eye head exercise WBS and NBS solid surface -Eye head exercise WBS and NBS solid surface

## 2020-12-02 NOTE — PROGRESS NOTES
Dx:       Gait instability   Unsteadiness        Authorized # of Visits:  No prior authorization is required per appointment notes       Next MD visit: none scheduled  Fall Risk: standard           Precautions: n/a             Subjective:  Patient reports minutes   -Instruction in gaze shifting in standing within WBS progressing to NBS and modified tandem stance -Gaze shifting with walking forward x 50 x 10  -Gaze shifting with lateral stepping (solid surface) -Gaze shifting with walking forward x 50 x 10

## 2020-12-04 NOTE — PROGRESS NOTES
Dx:       Gait instability   Unsteadiness        Authorized # of Visits:  No prior authorization is required per appointment notes       Next MD visit: none scheduled  Fall Risk: standard           Precautions: n/a             Subjective:  Patient reports x 10 minutes -Stationary bike self selected pace level 1.0 resistance x 10 minutes   -Instruction in gaze shifting in standing within WBS progressing to NBS and modified tandem stance -Gaze shifting with walking forward x 50 x 10  -Gaze shifting with later

## 2020-12-09 NOTE — PROGRESS NOTES
Dx:       Gait instability   Unsteadiness        Authorized # of Visits:  No prior authorization is required per appointment notes       Next MD visit: none scheduled  Fall Risk: standard           Precautions: n/a             Subjective:  Patient reports shifting in standing within WBS, NBS, modified standing -Stationary bike  Self selected pace level 1.0 resistance x 10 minutes -Stationary bike  Self selected pace level 1.0 resistance x 10 minutes -Stationary bike self selected pace level 1.0 resistance x walking  1 min horizontal plane; sagittal plane x 3  SSS: 1/10 -VOR x 1: Forward walk/backward walking  1 min horizontal plane; sagittal plane x 3  SSS: 1/10 VOR x 1:   Forward walk/backward walking  1 min horizontal plane; sagittal plane x 3

## 2020-12-10 NOTE — PROGRESS NOTES
Patient is here for MD f/u for Rectal cancer. Patient had a CT scan on 12/8. Patient is here with his wife to discuss the results. Patient had a rough week last week during his off week of Xeloda. Chronic fatigue and dizziness.  He continues doing physical

## 2020-12-11 NOTE — PROGRESS NOTES
Dx:       Gait instability   Unsteadiness        Authorized # of Visits:  No prior authorization is required per appointment notes       Next MD visit: none scheduled  Fall Risk: standard           Precautions: n/a           Subjective:  Patient reports he positions such as bending forward and return from from forward bending without balance loss  4.   Patient will demonstrate home program progression independently    Plan: Review gaze shifting; trial VOR x 1  Date: 11/20/2020  Tx#:  2   Date: 11/24/2020  Tx# 15 reps x 2 sets each. -B shoulder extension (standing):  12# 30 reps x 2 sets  -B/U mid row (standing): 20# 15 reps x 2 sets each. -B shoulder extension (standing):  12# 30 reps x 2 sets  -B/U mid row (standing): 20# 15 reps x 2 sets each.     -Eye head ex

## 2020-12-11 NOTE — PROGRESS NOTES
[address]    PATIENT'S NAME: Stephon Denny   ATTENDING PHYSICIAN: Janet Wilkinson M.D.    PATIENT ACCOUNT #: [de-identified] LOCATION: [address] [place] [address] RECORD #: [id number] YOB: 1965   DATE OF SERVICE: 12/10/2020       CANCER CENTER distress. VITAL SIGNS:  His performance status is 1. His weight is 280 pounds. Blood pressure is 137/89, pulse 99, respiratory rate is 20, temperature 96.9. HEENT:  Unremarkable. He has pink conjunctivae, anicteric sclerae. Pharynx without lesions. pneumonitis that can be serious. They understand and we plan on proceeding as soon as we can. Again, we need to follow up on his brain MRI to make sure that there is no other change there as well.     Dictated By Lico Lynch M.D.  d: 12/10/2020 18:0

## 2020-12-16 NOTE — PROGRESS NOTES
Dx:       Gait instability   Unsteadiness        Authorized # of Visits:  No prior authorization is required per appointment notes       Next MD visit: none scheduled  Fall Risk: standard           Precautions: n/a           Subjective:  Patient reports he without posterior balance loss  3. Patient will demonstrate the ability to attend to changes in positions such as bending forward and return from from forward bending without balance loss  4.   Patient will demonstrate home program progression independentl sets  -B/U mid row (standing): 20# 15 reps x 2 sets each. -B shoulder extension (standing):  12# 30 reps x 2 sets  -B/U mid row (standing): 20# 15 reps x 2 sets each.  B shoulder extension (standing):  12# 30 reps x 2 sets  -B/U mid row (standing): 20# 15 r

## 2020-12-18 NOTE — PROGRESS NOTES
Dx:       Gait instability   Unsteadiness        Authorized # of Visits:  No prior authorization is required per appointment notes       Next MD visit: none scheduled  Fall Risk: standard           Precautions: n/a           Subjective:  Patient reports he 1.0 resistance x 10 minutes -Stationary bike  Self selected pace level 1.0 resistance x 10 minutes -Stationary bike self selected pace level 1.0 resistance x 10 minutes -Stationary bike self selected pace level 1.0 resistance x 10 minutes -Stationary bike 15 reps x 2 sets   -Eye head exercise WBS and NBS solid surface _____________ -Supine bridge:  5 second hold x 20 reps  -Supine hook lying abdominal isomeric with stability ball:  5 second hold x 2 min -Supine bridge:  5 second hold x 20 reps  -Supine hook

## 2020-12-23 NOTE — PROGRESS NOTES
Progress Summary    Pt has attended 11  visits in Physical Therapy.     Dx:       Gait instability   Unsteadiness        Authorized # of Visits:  No prior authorization is required per appointment notes       Next MD visit: none scheduled  Fall Risk: stand movements, starting and stopping, and changes in directions without balance loss  2.   Patient will demonstrate the ability to consistently transition from various surfaces heights such as when transitioning from standing/sitting without posterior balance l sets  -SL leg press 7.0 level resistance:  12 reps x 3 sets --DL leg press: 8.0 level resistance;  15 reps x 3 sets  -SL leg press 8.0 level resistance:  12 reps x 3 sets -Deferred this visit   -Gaze shifting while stepping on/off airex pad forward and lat plane; sagittal plane x 3  SSS: 1/10 VOR x 1:   Forward walk/backward walking  1 min horizontal plane; sagittal plane x 3 -Progressed VOR x 1 to VOR x 2 within a sitting position -VOR x 2 1 min x 3 -VOR x 2 1 min x 3 -VOR x 2 1 min x 3  -High knee wall over

## 2020-12-29 NOTE — PROGRESS NOTES
Chemotherapy Education    Learner:  Patient    Barriers / Limitations:  None    Chemotherapy education goals:  · Learn the drug names  · Administration schedule  · Routes of administration  · Treatment setting    Drug names:  Enhertu     Treatment Effects Status:    Potential mood changes, depression, nervousness, difficulty sleeping:  Achieved    Importance of support system:  Achieved    Notify MD/RN of any emotional changes:  Achieved    Comments:    Vesicants / Irritants:    Potential extravasation at s

## 2020-12-30 NOTE — TELEPHONE ENCOUNTER
TC to patient. MRI shows cerebellar tumor is slightly larger, perhaps stable. FLAIR/edema is worse. L parietal met is 4 mm, 3 mm before. Pt on Enhertu (1 dose in) for systemic progression on recent CT; markers rising  He feels ataxia is stable.  Wife

## 2020-12-30 NOTE — TELEPHONE ENCOUNTER
Toxicities: C1 D1 Fam-Trastuzumab Deruxtec-nxki on 12/29/2020       Anderson Estimable said he is feeling good today. No side effects at this time. I encouraged him to please call the office if he is not feeling well or has any questions or concerns. He agreed.

## 2020-12-30 NOTE — TELEPHONE ENCOUNTER
Discussed recent MRI findings with Dr. Vicenta Aponte. Dr. Vicenta Aponte spoke with the patient. Plan for patient to see Dr. Inge Mohan and Dr. Vicenta Aponte next Tuesday, 1/5 after conference. Please call patient to schedule and inform oncology so they're aware.     Please email o

## 2020-12-30 NOTE — PROGRESS NOTES
Dx:       Gait instability   Unsteadiness        Authorized # of Visits:  No prior authorization is required per appointment notes       Next MD visit: none scheduled  Fall Risk: standard           Precautions: n/a           Subjective:  Had new infusion demonstrate the ability to consistently transition from various surfaces heights such as when transitioning from standing/sitting without posterior balance loss  3.   Patient will demonstrate the ability to attend to changes in positions such as bending for sets  -SL leg press 7.0 level resistance:  12 reps x 3 sets --DL leg press: 8.0 level resistance;  15 reps x 3 sets  -SL leg press 8.0 level resistance:  12 reps x 3 sets -Deferred this visit -Deferred this visit   -Gaze shifting while stepping on/off aire plane x 3  SSS: 1/10 -VOR x 1: Forward walk/backward walking  1 min horizontal plane; sagittal plane x 3  SSS: 1/10 VOR x 1:   Forward walk/backward walking  1 min horizontal plane; sagittal plane x 3 -Progressed VOR x 1 to VOR x 2 within a sitting positio

## 2021-01-01 ENCOUNTER — APPOINTMENT (OUTPATIENT)
Dept: CT IMAGING | Facility: HOSPITAL | Age: 56
End: 2021-01-01
Attending: EMERGENCY MEDICINE
Payer: COMMERCIAL

## 2021-01-01 ENCOUNTER — TELEPHONE (OUTPATIENT)
Dept: RADIATION ONCOLOGY | Facility: HOSPITAL | Age: 56
End: 2021-01-01

## 2021-01-01 ENCOUNTER — TELEPHONE (OUTPATIENT)
Dept: HEMATOLOGY/ONCOLOGY | Facility: HOSPITAL | Age: 56
End: 2021-01-01

## 2021-01-01 ENCOUNTER — APPOINTMENT (OUTPATIENT)
Dept: HEMATOLOGY/ONCOLOGY | Age: 56
End: 2021-01-01
Attending: INTERNAL MEDICINE
Payer: COMMERCIAL

## 2021-01-01 ENCOUNTER — PATIENT MESSAGE (OUTPATIENT)
Dept: RADIATION ONCOLOGY | Facility: HOSPITAL | Age: 56
End: 2021-01-01

## 2021-01-01 ENCOUNTER — OFFICE VISIT (OUTPATIENT)
Dept: PHYSICAL THERAPY | Age: 56
End: 2021-01-01
Attending: FAMILY MEDICINE
Payer: COMMERCIAL

## 2021-01-01 ENCOUNTER — TELEPHONE (OUTPATIENT)
Dept: SURGERY | Facility: CLINIC | Age: 56
End: 2021-01-01

## 2021-01-01 ENCOUNTER — OFFICE VISIT (OUTPATIENT)
Dept: HEMATOLOGY/ONCOLOGY | Age: 56
End: 2021-01-01
Attending: INTERNAL MEDICINE
Payer: COMMERCIAL

## 2021-01-01 ENCOUNTER — ANESTHESIA (OUTPATIENT)
Dept: SURGERY | Facility: HOSPITAL | Age: 56
DRG: 026 | End: 2021-01-01
Payer: COMMERCIAL

## 2021-01-01 ENCOUNTER — HOSPITAL ENCOUNTER (OUTPATIENT)
Dept: RADIATION ONCOLOGY | Facility: HOSPITAL | Age: 56
Discharge: HOME | End: 2021-01-01
Attending: RADIOLOGY
Payer: COMMERCIAL

## 2021-01-01 ENCOUNTER — OFFICE VISIT (OUTPATIENT)
Dept: SURGERY | Facility: CLINIC | Age: 56
End: 2021-01-01
Payer: COMMERCIAL

## 2021-01-01 ENCOUNTER — ANESTHESIA EVENT (OUTPATIENT)
Dept: SURGERY | Facility: HOSPITAL | Age: 56
DRG: 026 | End: 2021-01-01
Payer: COMMERCIAL

## 2021-01-01 ENCOUNTER — LAB ENCOUNTER (OUTPATIENT)
Dept: LAB | Age: 56
End: 2021-01-01
Attending: NEUROLOGICAL SURGERY
Payer: COMMERCIAL

## 2021-01-01 ENCOUNTER — PATIENT MESSAGE (OUTPATIENT)
Dept: HEMATOLOGY/ONCOLOGY | Age: 56
End: 2021-01-01

## 2021-01-01 ENCOUNTER — SOCIAL WORK SERVICES (OUTPATIENT)
Dept: HEMATOLOGY/ONCOLOGY | Facility: HOSPITAL | Age: 56
End: 2021-01-01

## 2021-01-01 ENCOUNTER — HOSPITAL ENCOUNTER (OUTPATIENT)
Dept: MRI IMAGING | Facility: HOSPITAL | Age: 56
Discharge: HOME | End: 2021-01-01
Attending: PHYSICIAN ASSISTANT
Payer: COMMERCIAL

## 2021-01-01 ENCOUNTER — HOSPITAL ENCOUNTER (OUTPATIENT)
Dept: CV DIAGNOSTICS | Facility: HOSPITAL | Age: 56
Discharge: HOME | End: 2021-01-01
Attending: INTERNAL MEDICINE
Payer: COMMERCIAL

## 2021-01-01 ENCOUNTER — HOSPITAL ENCOUNTER (OUTPATIENT)
Dept: INTERVENTIONAL RADIOLOGY/VASCULAR | Facility: HOSPITAL | Age: 56
Discharge: HOME | End: 2021-01-01
Attending: INTERNAL MEDICINE | Admitting: INTERNAL MEDICINE
Payer: COMMERCIAL

## 2021-01-01 ENCOUNTER — TELEMEDICINE (OUTPATIENT)
Dept: NEUROLOGY | Facility: CLINIC | Age: 56
End: 2021-01-01

## 2021-01-01 ENCOUNTER — PATIENT MESSAGE (OUTPATIENT)
Dept: NEUROLOGY | Facility: CLINIC | Age: 56
End: 2021-01-01

## 2021-01-01 ENCOUNTER — NURSE ONLY (OUTPATIENT)
Dept: HEMATOLOGY/ONCOLOGY | Facility: HOSPITAL | Age: 56
End: 2021-01-01
Attending: INTERNAL MEDICINE
Payer: COMMERCIAL

## 2021-01-01 ENCOUNTER — ORDER TRANSCRIPTION (OUTPATIENT)
Dept: PHYSICAL THERAPY | Facility: HOSPITAL | Age: 56
End: 2021-01-01

## 2021-01-01 ENCOUNTER — HOSPITAL ENCOUNTER (OUTPATIENT)
Dept: CT IMAGING | Age: 56
Discharge: HOME | End: 2021-01-01
Attending: INTERNAL MEDICINE
Payer: COMMERCIAL

## 2021-01-01 ENCOUNTER — APPOINTMENT (OUTPATIENT)
Dept: MRI IMAGING | Facility: HOSPITAL | Age: 56
DRG: 026 | End: 2021-01-01
Attending: PHYSICIAN ASSISTANT
Payer: COMMERCIAL

## 2021-01-01 ENCOUNTER — LAB ENCOUNTER (OUTPATIENT)
Dept: LAB | Age: 56
End: 2021-01-01
Attending: INTERNAL MEDICINE
Payer: COMMERCIAL

## 2021-01-01 ENCOUNTER — LABORATORY ENCOUNTER (OUTPATIENT)
Dept: LAB | Age: 56
End: 2021-01-01
Attending: NEUROLOGICAL SURGERY
Payer: COMMERCIAL

## 2021-01-01 ENCOUNTER — HOSPITAL ENCOUNTER (OUTPATIENT)
Facility: HOSPITAL | Age: 56
Setting detail: OBSERVATION
Discharge: HOSPICE | End: 2021-01-01
Attending: EMERGENCY MEDICINE | Admitting: HOSPITALIST
Payer: COMMERCIAL

## 2021-01-01 ENCOUNTER — OFFICE VISIT (OUTPATIENT)
Dept: NEUROLOGY | Facility: CLINIC | Age: 56
End: 2021-01-01
Payer: COMMERCIAL

## 2021-01-01 ENCOUNTER — HOSPITAL ENCOUNTER (INPATIENT)
Facility: HOSPITAL | Age: 56
LOS: 7 days | Discharge: NURSING, REHAB OR LONG-TERM CARE | DRG: 026 | End: 2021-01-01
Attending: NEUROLOGICAL SURGERY | Admitting: NEUROLOGICAL SURGERY
Payer: COMMERCIAL

## 2021-01-01 ENCOUNTER — DOCUMENTATION ONLY (OUTPATIENT)
Dept: HEMATOLOGY/ONCOLOGY | Facility: HOSPITAL | Age: 56
End: 2021-01-01

## 2021-01-01 ENCOUNTER — APPOINTMENT (OUTPATIENT)
Dept: PHYSICAL THERAPY | Age: 56
End: 2021-01-01
Attending: FAMILY MEDICINE
Payer: COMMERCIAL

## 2021-01-01 ENCOUNTER — NURSE ONLY (OUTPATIENT)
Dept: HEMATOLOGY/ONCOLOGY | Age: 56
End: 2021-01-01
Attending: INTERNAL MEDICINE
Payer: COMMERCIAL

## 2021-01-01 ENCOUNTER — TELEPHONE (OUTPATIENT)
Dept: HEMATOLOGY/ONCOLOGY | Age: 56
End: 2021-01-01

## 2021-01-01 ENCOUNTER — TUMOR BOARD CONFERENCE (OUTPATIENT)
Dept: RADIATION ONCOLOGY | Facility: HOSPITAL | Age: 56
End: 2021-01-01

## 2021-01-01 VITALS
BODY MASS INDEX: 30.84 KG/M2 | TEMPERATURE: 96 F | OXYGEN SATURATION: 97 % | HEIGHT: 77.01 IN | SYSTOLIC BLOOD PRESSURE: 130 MMHG | RESPIRATION RATE: 18 BRPM | DIASTOLIC BLOOD PRESSURE: 86 MMHG | HEART RATE: 112 BPM | WEIGHT: 261.19 LBS

## 2021-01-01 VITALS
WEIGHT: 261 LBS | BODY MASS INDEX: 30.23 KG/M2 | HEIGHT: 77.01 IN | SYSTOLIC BLOOD PRESSURE: 139 MMHG | HEART RATE: 95 BPM | DIASTOLIC BLOOD PRESSURE: 82 MMHG | BODY MASS INDEX: 30.82 KG/M2 | HEART RATE: 107 BPM | OXYGEN SATURATION: 97 % | WEIGHT: 256 LBS | RESPIRATION RATE: 16 BRPM | HEIGHT: 77.01 IN | DIASTOLIC BLOOD PRESSURE: 92 MMHG | OXYGEN SATURATION: 94 % | RESPIRATION RATE: 16 BRPM | TEMPERATURE: 98 F | SYSTOLIC BLOOD PRESSURE: 108 MMHG | TEMPERATURE: 97 F

## 2021-01-01 VITALS
SYSTOLIC BLOOD PRESSURE: 104 MMHG | HEIGHT: 77 IN | WEIGHT: 268.94 LBS | HEART RATE: 95 BPM | OXYGEN SATURATION: 95 % | RESPIRATION RATE: 16 BRPM | BODY MASS INDEX: 31.75 KG/M2 | TEMPERATURE: 98 F | DIASTOLIC BLOOD PRESSURE: 62 MMHG

## 2021-01-01 VITALS
OXYGEN SATURATION: 98 % | DIASTOLIC BLOOD PRESSURE: 84 MMHG | RESPIRATION RATE: 18 BRPM | HEART RATE: 106 BPM | TEMPERATURE: 98 F | SYSTOLIC BLOOD PRESSURE: 128 MMHG

## 2021-01-01 VITALS
WEIGHT: 249.5 LBS | DIASTOLIC BLOOD PRESSURE: 77 MMHG | SYSTOLIC BLOOD PRESSURE: 108 MMHG | OXYGEN SATURATION: 97 % | RESPIRATION RATE: 16 BRPM | HEART RATE: 113 BPM | HEIGHT: 77.01 IN | BODY MASS INDEX: 29.46 KG/M2

## 2021-01-01 VITALS
DIASTOLIC BLOOD PRESSURE: 89 MMHG | HEART RATE: 104 BPM | HEIGHT: 77 IN | OXYGEN SATURATION: 96 % | TEMPERATURE: 99 F | WEIGHT: 252.5 LBS | RESPIRATION RATE: 18 BRPM | BODY MASS INDEX: 29.81 KG/M2 | SYSTOLIC BLOOD PRESSURE: 138 MMHG

## 2021-01-01 VITALS
OXYGEN SATURATION: 98 % | HEART RATE: 101 BPM | BODY MASS INDEX: 31 KG/M2 | RESPIRATION RATE: 16 BRPM | SYSTOLIC BLOOD PRESSURE: 118 MMHG | WEIGHT: 258 LBS | DIASTOLIC BLOOD PRESSURE: 80 MMHG

## 2021-01-01 VITALS
RESPIRATION RATE: 16 BRPM | BODY MASS INDEX: 29.81 KG/M2 | TEMPERATURE: 97 F | SYSTOLIC BLOOD PRESSURE: 125 MMHG | DIASTOLIC BLOOD PRESSURE: 86 MMHG | HEIGHT: 77.01 IN | OXYGEN SATURATION: 98 % | WEIGHT: 252.5 LBS | HEART RATE: 82 BPM

## 2021-01-01 VITALS
OXYGEN SATURATION: 96 % | RESPIRATION RATE: 16 BRPM | BODY MASS INDEX: 29.46 KG/M2 | HEART RATE: 107 BPM | SYSTOLIC BLOOD PRESSURE: 115 MMHG | HEIGHT: 77.01 IN | TEMPERATURE: 98 F | DIASTOLIC BLOOD PRESSURE: 80 MMHG | WEIGHT: 249.5 LBS

## 2021-01-01 VITALS
SYSTOLIC BLOOD PRESSURE: 114 MMHG | HEART RATE: 73 BPM | DIASTOLIC BLOOD PRESSURE: 76 MMHG | HEIGHT: 77 IN | BODY MASS INDEX: 29.46 KG/M2 | WEIGHT: 249.5 LBS

## 2021-01-01 VITALS — HEART RATE: 109 BPM | DIASTOLIC BLOOD PRESSURE: 82 MMHG | TEMPERATURE: 98 F | SYSTOLIC BLOOD PRESSURE: 117 MMHG

## 2021-01-01 VITALS
OXYGEN SATURATION: 96 % | RESPIRATION RATE: 21 BRPM | SYSTOLIC BLOOD PRESSURE: 118 MMHG | TEMPERATURE: 98 F | HEART RATE: 106 BPM | DIASTOLIC BLOOD PRESSURE: 73 MMHG

## 2021-01-01 VITALS
TEMPERATURE: 97 F | BODY MASS INDEX: 29.12 KG/M2 | WEIGHT: 246.63 LBS | HEIGHT: 77 IN | HEART RATE: 114 BPM | SYSTOLIC BLOOD PRESSURE: 120 MMHG | DIASTOLIC BLOOD PRESSURE: 90 MMHG

## 2021-01-01 VITALS
RESPIRATION RATE: 18 BRPM | HEIGHT: 77.01 IN | WEIGHT: 253.38 LBS | OXYGEN SATURATION: 98 % | BODY MASS INDEX: 29.92 KG/M2 | TEMPERATURE: 97 F | DIASTOLIC BLOOD PRESSURE: 94 MMHG | SYSTOLIC BLOOD PRESSURE: 151 MMHG | HEART RATE: 87 BPM

## 2021-01-01 VITALS — WEIGHT: 260.5 LBS | BODY MASS INDEX: 31 KG/M2

## 2021-01-01 DIAGNOSIS — C78.01 SECONDARY CARCINOMA OF RIGHT LUNG (HCC): ICD-10-CM

## 2021-01-01 DIAGNOSIS — C79.31 BRAIN METASTASES (HCC): Primary | ICD-10-CM

## 2021-01-01 DIAGNOSIS — C20 RECTAL CANCER (HCC): ICD-10-CM

## 2021-01-01 DIAGNOSIS — C78.7 SECONDARY LIVER CANCER (HCC): ICD-10-CM

## 2021-01-01 DIAGNOSIS — C78.02 SECONDARY ADENOCARCINOMA OF LEFT LUNG (HCC): ICD-10-CM

## 2021-01-01 DIAGNOSIS — C79.31 BRAIN METASTASES: Primary | ICD-10-CM

## 2021-01-01 DIAGNOSIS — R25.2 MUSCLE CRAMP: Primary | ICD-10-CM

## 2021-01-01 DIAGNOSIS — C79.31 SECONDARY CANCER OF BRAIN (HCC): Primary | ICD-10-CM

## 2021-01-01 DIAGNOSIS — D50.0 IRON DEFICIENCY ANEMIA DUE TO CHRONIC BLOOD LOSS: Primary | ICD-10-CM

## 2021-01-01 DIAGNOSIS — C79.51 SPINE METASTASIS (HCC): ICD-10-CM

## 2021-01-01 DIAGNOSIS — C79.51 SECONDARY CANCER OF BONE (HCC): Primary | ICD-10-CM

## 2021-01-01 DIAGNOSIS — R18.0 MALIGNANT ASCITES: ICD-10-CM

## 2021-01-01 DIAGNOSIS — D12.6 ADENOMATOUS POLYP OF COLON, UNSPECIFIED PART OF COLON: ICD-10-CM

## 2021-01-01 DIAGNOSIS — Z83.71 FAMILY HISTORY OF COLONIC POLYPS: ICD-10-CM

## 2021-01-01 DIAGNOSIS — C78.7 RECTAL ADENOCARCINOMA METASTATIC TO LIVER (HCC): Primary | ICD-10-CM

## 2021-01-01 DIAGNOSIS — C18.6 CANCER OF DESCENDING COLON (HCC): ICD-10-CM

## 2021-01-01 DIAGNOSIS — D50.0 IRON DEFICIENCY ANEMIA DUE TO CHRONIC BLOOD LOSS: ICD-10-CM

## 2021-01-01 DIAGNOSIS — C79.31 BRAIN METASTASES (HCC): ICD-10-CM

## 2021-01-01 DIAGNOSIS — C20 RECTAL CANCER (HCC): Primary | ICD-10-CM

## 2021-01-01 DIAGNOSIS — C79.31 SECONDARY CANCER OF BRAIN (HCC): ICD-10-CM

## 2021-01-01 DIAGNOSIS — C79.51 SECONDARY CANCER OF BONE (HCC): ICD-10-CM

## 2021-01-01 DIAGNOSIS — C78.7 RECTAL CANCER METASTASIZED TO LIVER (HCC): ICD-10-CM

## 2021-01-01 DIAGNOSIS — R26.81 UNSTEADINESS: ICD-10-CM

## 2021-01-01 DIAGNOSIS — C18.9 MALIGNANT NEOPLASM OF COLON, UNSPECIFIED PART OF COLON (HCC): ICD-10-CM

## 2021-01-01 DIAGNOSIS — R42 DIZZINESS: ICD-10-CM

## 2021-01-01 DIAGNOSIS — C20 RECTAL CANCER METASTASIZED TO LIVER (HCC): ICD-10-CM

## 2021-01-01 DIAGNOSIS — C78.00 RECTAL CANCER METASTASIZED TO LUNG (HCC): ICD-10-CM

## 2021-01-01 DIAGNOSIS — C20 RECTAL CANCER METASTASIZED TO LUNG (HCC): ICD-10-CM

## 2021-01-01 DIAGNOSIS — C78.7 SECONDARY LIVER CANCER (HCC): Primary | ICD-10-CM

## 2021-01-01 DIAGNOSIS — Z98.890 S/P CRANIOTOMY: ICD-10-CM

## 2021-01-01 DIAGNOSIS — R25.2 MUSCLE CRAMP: ICD-10-CM

## 2021-01-01 DIAGNOSIS — C20 RECTAL ADENOCARCINOMA METASTATIC TO LIVER (HCC): ICD-10-CM

## 2021-01-01 DIAGNOSIS — R18.0 MALIGNANT ASCITES: Primary | ICD-10-CM

## 2021-01-01 DIAGNOSIS — C78.01 SECONDARY CARCINOMA OF RIGHT LUNG (HCC): Primary | ICD-10-CM

## 2021-01-01 DIAGNOSIS — M62.81 MUSCLE WEAKNESS ON EXAMINATION: ICD-10-CM

## 2021-01-01 DIAGNOSIS — C20 RECTAL ADENOCARCINOMA METASTATIC TO BONE (HCC): Primary | ICD-10-CM

## 2021-01-01 DIAGNOSIS — R26.81 GAIT INSTABILITY: ICD-10-CM

## 2021-01-01 DIAGNOSIS — Z98.890 S/P CRANIOTOMY: Primary | ICD-10-CM

## 2021-01-01 DIAGNOSIS — C79.51 RECTAL ADENOCARCINOMA METASTATIC TO BONE (HCC): Primary | ICD-10-CM

## 2021-01-01 DIAGNOSIS — C20 RECTAL ADENOCARCINOMA METASTATIC TO BONE (HCC): ICD-10-CM

## 2021-01-01 DIAGNOSIS — C18.6 CANCER OF DESCENDING COLON (HCC): Primary | ICD-10-CM

## 2021-01-01 DIAGNOSIS — C20 RECTAL ADENOCARCINOMA METASTATIC TO LIVER (HCC): Primary | ICD-10-CM

## 2021-01-01 DIAGNOSIS — C79.51 RECTAL ADENOCARCINOMA METASTATIC TO BONE (HCC): ICD-10-CM

## 2021-01-01 DIAGNOSIS — Z71.89 OTHER SPECIFIED COUNSELING: ICD-10-CM

## 2021-01-01 DIAGNOSIS — C78.7 RECTAL ADENOCARCINOMA METASTATIC TO LIVER (HCC): ICD-10-CM

## 2021-01-01 DIAGNOSIS — K92.2 GASTROINTESTINAL HEMORRHAGE, UNSPECIFIED GASTROINTESTINAL HEMORRHAGE TYPE: Primary | ICD-10-CM

## 2021-01-01 LAB
ALBUMIN SERPL-MCNC: 2 G/DL (ref 3.4–5)
ALBUMIN SERPL-MCNC: 2 G/DL (ref 3.4–5)
ALBUMIN SERPL-MCNC: 2.1 G/DL (ref 3.4–5)
ALBUMIN SERPL-MCNC: 2.7 G/DL (ref 3.4–5)
ALBUMIN SERPL-MCNC: 2.8 G/DL (ref 3.4–5)
ALBUMIN/GLOB SERPL: 0.4 {RATIO} (ref 1–2)
ALBUMIN/GLOB SERPL: 0.7 {RATIO} (ref 1–2)
ALBUMIN/GLOB SERPL: 0.7 {RATIO} (ref 1–2)
ALP LIVER SERPL-CCNC: 188 U/L
ALP LIVER SERPL-CCNC: 225 U/L
ALP LIVER SERPL-CCNC: 230 U/L
ALP LIVER SERPL-CCNC: 231 U/L
ALP LIVER SERPL-CCNC: 232 U/L
ALT SERPL-CCNC: 17 U/L
ALT SERPL-CCNC: 24 U/L
ALT SERPL-CCNC: 27 U/L
ALT SERPL-CCNC: 51 U/L
ALT SERPL-CCNC: 57 U/L
ANION GAP SERPL CALC-SCNC: 12 MMOL/L (ref 0–18)
ANION GAP SERPL CALC-SCNC: 5 MMOL/L (ref 0–18)
ANION GAP SERPL CALC-SCNC: 6 MMOL/L (ref 0–18)
ANION GAP SERPL CALC-SCNC: 7 MMOL/L (ref 0–18)
ANION GAP SERPL CALC-SCNC: 7 MMOL/L (ref 0–18)
ANTIBODY SCREEN: NEGATIVE
APTT PPP: 29.8 SECONDS (ref 25.4–36.1)
AST SERPL-CCNC: 41 U/L (ref 15–37)
AST SERPL-CCNC: 55 U/L (ref 15–37)
AST SERPL-CCNC: 60 U/L (ref 15–37)
AST SERPL-CCNC: 76 U/L (ref 15–37)
AST SERPL-CCNC: 77 U/L (ref 15–37)
BASOPHILS # BLD AUTO: 0.02 X10(3) UL (ref 0–0.2)
BASOPHILS # BLD AUTO: 0.04 X10(3) UL (ref 0–0.2)
BASOPHILS # BLD AUTO: 0.05 X10(3) UL (ref 0–0.2)
BASOPHILS # BLD AUTO: 0.05 X10(3) UL (ref 0–0.2)
BASOPHILS # BLD: 0 X10(3) UL (ref 0–0.2)
BASOPHILS # BLD: 0 X10(3) UL (ref 0–0.2)
BASOPHILS NFR BLD AUTO: 0.1 %
BASOPHILS NFR BLD AUTO: 0.2 %
BASOPHILS NFR BLD AUTO: 0.5 %
BASOPHILS NFR BLD AUTO: 1.1 %
BASOPHILS NFR BLD: 0 %
BASOPHILS NFR BLD: 0 %
BILIRUB SERPL-MCNC: 0.4 MG/DL (ref 0.1–2)
BILIRUB SERPL-MCNC: 0.5 MG/DL (ref 0.1–2)
BILIRUB SERPL-MCNC: 0.6 MG/DL (ref 0.1–2)
BUN BLD-MCNC: 16 MG/DL (ref 7–18)
BUN BLD-MCNC: 17 MG/DL (ref 7–18)
BUN BLD-MCNC: 27 MG/DL (ref 7–18)
BUN BLD-MCNC: 6 MG/DL (ref 7–18)
BUN BLD-MCNC: 7 MG/DL (ref 7–18)
BUN/CREAT SERPL: 14.8 (ref 10–20)
BUN/CREAT SERPL: 15.3 (ref 10–20)
BUN/CREAT SERPL: 21.4 (ref 10–20)
BUN/CREAT SERPL: 5.7 (ref 10–20)
BUN/CREAT SERPL: 5.7 (ref 10–20)
CALCIUM BLD-MCNC: 8.1 MG/DL (ref 8.5–10.1)
CALCIUM BLD-MCNC: 8.6 MG/DL (ref 8.5–10.1)
CALCIUM BLD-MCNC: 8.9 MG/DL (ref 8.5–10.1)
CALCIUM BLD-MCNC: 9 MG/DL (ref 8.5–10.1)
CALCIUM BLD-MCNC: 9.2 MG/DL (ref 8.5–10.1)
CEA SERPL-MCNC: ABNORMAL NG/ML (ref ?–5)
CEA SERPL-MCNC: ABNORMAL NG/ML (ref ?–5)
CHLORIDE SERPL-SCNC: 100 MMOL/L (ref 98–112)
CHLORIDE SERPL-SCNC: 101 MMOL/L (ref 98–112)
CHLORIDE SERPL-SCNC: 103 MMOL/L (ref 98–112)
CHLORIDE SERPL-SCNC: 98 MMOL/L (ref 98–112)
CO2 SERPL-SCNC: 21 MMOL/L (ref 21–32)
CO2 SERPL-SCNC: 28 MMOL/L (ref 21–32)
CO2 SERPL-SCNC: 28 MMOL/L (ref 21–32)
CO2 SERPL-SCNC: 29 MMOL/L (ref 21–32)
CO2 SERPL-SCNC: 30 MMOL/L (ref 21–32)
CO2 SERPL-SCNC: 31 MMOL/L (ref 21–32)
CREAT BLD-MCNC: 1.06 MG/DL
CREAT BLD-MCNC: 1.08 MG/DL
CREAT BLD-MCNC: 1.11 MG/DL
CREAT BLD-MCNC: 1.22 MG/DL
CREAT BLD-MCNC: 1.26 MG/DL
DEPRECATED RDW RBC AUTO: 58.6 FL (ref 35.1–46.3)
DEPRECATED RDW RBC AUTO: 59.3 FL (ref 35.1–46.3)
DEPRECATED RDW RBC AUTO: 60.9 FL (ref 35.1–46.3)
DEPRECATED RDW RBC AUTO: 62.1 FL (ref 35.1–46.3)
DEPRECATED RDW RBC AUTO: 68.9 FL (ref 35.1–46.3)
DEPRECATED RDW RBC AUTO: 75.4 FL (ref 35.1–46.3)
EOSINOPHIL # BLD AUTO: 0.05 X10(3) UL (ref 0–0.7)
EOSINOPHIL # BLD AUTO: 0.08 X10(3) UL (ref 0–0.7)
EOSINOPHIL # BLD AUTO: 0.09 X10(3) UL (ref 0–0.7)
EOSINOPHIL # BLD AUTO: 0.35 X10(3) UL (ref 0–0.7)
EOSINOPHIL # BLD: 0 X10(3) UL (ref 0–0.7)
EOSINOPHIL # BLD: 0.11 X10(3) UL (ref 0–0.7)
EOSINOPHIL NFR BLD AUTO: 0.3 %
EOSINOPHIL NFR BLD AUTO: 0.9 %
EOSINOPHIL NFR BLD AUTO: 1.7 %
EOSINOPHIL NFR BLD AUTO: 2.3 %
EOSINOPHIL NFR BLD: 0 %
EOSINOPHIL NFR BLD: 1 %
ERYTHROCYTE [DISTWIDTH] IN BLOOD BY AUTOMATED COUNT: 15.7 % (ref 11–15)
ERYTHROCYTE [DISTWIDTH] IN BLOOD BY AUTOMATED COUNT: 15.9 % (ref 11–15)
ERYTHROCYTE [DISTWIDTH] IN BLOOD BY AUTOMATED COUNT: 16 % (ref 11–15)
ERYTHROCYTE [DISTWIDTH] IN BLOOD BY AUTOMATED COUNT: 17.5 % (ref 11–15)
ERYTHROCYTE [DISTWIDTH] IN BLOOD BY AUTOMATED COUNT: 19.4 % (ref 11–15)
ERYTHROCYTE [DISTWIDTH] IN BLOOD BY AUTOMATED COUNT: 19.9 % (ref 11–15)
GLOBULIN PLAS-MCNC: 4.1 G/DL (ref 2.8–4.4)
GLOBULIN PLAS-MCNC: 4.2 G/DL (ref 2.8–4.4)
GLOBULIN PLAS-MCNC: 4.6 G/DL (ref 2.8–4.4)
GLOBULIN PLAS-MCNC: 4.8 G/DL (ref 2.8–4.4)
GLOBULIN PLAS-MCNC: 5.1 G/DL (ref 2.8–4.4)
GLUCOSE BLD-MCNC: 111 MG/DL (ref 70–99)
GLUCOSE BLD-MCNC: 112 MG/DL (ref 70–99)
GLUCOSE BLD-MCNC: 119 MG/DL (ref 70–99)
GLUCOSE BLD-MCNC: 164 MG/DL (ref 70–99)
GLUCOSE BLD-MCNC: 77 MG/DL (ref 70–99)
HCT VFR BLD AUTO: 31 %
HCT VFR BLD AUTO: 31.7 %
HCT VFR BLD AUTO: 33.8 %
HCT VFR BLD AUTO: 35.9 %
HCT VFR BLD AUTO: 35.9 %
HCT VFR BLD AUTO: 36.6 %
HGB BLD-MCNC: 10.3 G/DL
HGB BLD-MCNC: 10.3 G/DL
HGB BLD-MCNC: 11.1 G/DL
HGB BLD-MCNC: 11.7 G/DL
HGB BLD-MCNC: 11.8 G/DL
HGB BLD-MCNC: 9.9 G/DL
IMM GRANULOCYTES # BLD AUTO: 0.08 X10(3) UL (ref 0–1)
IMM GRANULOCYTES # BLD AUTO: 0.2 X10(3) UL (ref 0–1)
IMM GRANULOCYTES # BLD AUTO: 0.21 X10(3) UL (ref 0–1)
IMM GRANULOCYTES # BLD AUTO: 0.22 X10(3) UL (ref 0–1)
IMM GRANULOCYTES NFR BLD: 1.1 %
IMM GRANULOCYTES NFR BLD: 1.4 %
IMM GRANULOCYTES NFR BLD: 1.7 %
IMM GRANULOCYTES NFR BLD: 2.2 %
INR BLD: 1.12 (ref 0.88–1.11)
INR: 1.3 (ref 0.8–1.3)
LYMPHOCYTES # BLD AUTO: 0.45 X10(3) UL (ref 1–4)
LYMPHOCYTES # BLD AUTO: 0.8 X10(3) UL (ref 1–4)
LYMPHOCYTES # BLD AUTO: 0.87 X10(3) UL (ref 1–4)
LYMPHOCYTES # BLD AUTO: 1.02 X10(3) UL (ref 1–4)
LYMPHOCYTES NFR BLD AUTO: 18.9 %
LYMPHOCYTES NFR BLD AUTO: 2.9 %
LYMPHOCYTES NFR BLD AUTO: 5.8 %
LYMPHOCYTES NFR BLD AUTO: 8.4 %
LYMPHOCYTES NFR BLD: 1.91 X10(3) UL (ref 1–4)
LYMPHOCYTES NFR BLD: 19 %
LYMPHOCYTES NFR BLD: 2.11 X10(3) UL (ref 1–4)
LYMPHOCYTES NFR BLD: 21 %
M PROTEIN MFR SERPL ELPH: 6.6 G/DL (ref 6.4–8.2)
M PROTEIN MFR SERPL ELPH: 6.8 G/DL (ref 6.4–8.2)
M PROTEIN MFR SERPL ELPH: 6.9 G/DL (ref 6.4–8.2)
M PROTEIN MFR SERPL ELPH: 7 G/DL (ref 6.4–8.2)
M PROTEIN MFR SERPL ELPH: 7.1 G/DL (ref 6.4–8.2)
MCH RBC QN AUTO: 29.6 PG (ref 26–34)
MCH RBC QN AUTO: 31.8 PG (ref 26–34)
MCH RBC QN AUTO: 31.8 PG (ref 26–34)
MCH RBC QN AUTO: 32.2 PG (ref 26–34)
MCH RBC QN AUTO: 33.4 PG (ref 26–34)
MCH RBC QN AUTO: 33.6 PG (ref 26–34)
MCHC RBC AUTO-ENTMCNC: 28.1 G/DL (ref 31–37)
MCHC RBC AUTO-ENTMCNC: 31.9 G/DL (ref 31–37)
MCHC RBC AUTO-ENTMCNC: 32.5 G/DL (ref 31–37)
MCHC RBC AUTO-ENTMCNC: 32.6 G/DL (ref 31–37)
MCHC RBC AUTO-ENTMCNC: 32.8 G/DL (ref 31–37)
MCHC RBC AUTO-ENTMCNC: 32.9 G/DL (ref 31–37)
MCV RBC AUTO: 102.3 FL
MCV RBC AUTO: 102.9 FL
MCV RBC AUTO: 105.2 FL
MCV RBC AUTO: 97.6 FL
MCV RBC AUTO: 98 FL
MCV RBC AUTO: 99.7 FL
METAMYELOCYTES # BLD: 0.11 X10(3) UL
METAMYELOCYTES NFR BLD: 1 %
MONOCYTES # BLD AUTO: 0.17 X10(3) UL (ref 0.1–1)
MONOCYTES # BLD AUTO: 0.74 X10(3) UL (ref 0.1–1)
MONOCYTES # BLD AUTO: 1.19 X10(3) UL (ref 0.1–1)
MONOCYTES # BLD AUTO: 1.21 X10(3) UL (ref 0.1–1)
MONOCYTES # BLD: 0.64 X10(3) UL (ref 0.1–1)
MONOCYTES # BLD: 1.55 X10(3) UL (ref 0.1–1)
MONOCYTES NFR BLD AUTO: 1.1 %
MONOCYTES NFR BLD AUTO: 12.5 %
MONOCYTES NFR BLD AUTO: 16.1 %
MONOCYTES NFR BLD AUTO: 6.9 %
MONOCYTES NFR BLD: 14 %
MONOCYTES NFR BLD: 7 %
MYELOCYTES # BLD: 0.11 X10(3) UL
MYELOCYTES NFR BLD: 1 %
NEUTROPHILS # BLD AUTO: 14.23 X10 (3) UL (ref 1.5–7.7)
NEUTROPHILS # BLD AUTO: 14.23 X10(3) UL (ref 1.5–7.7)
NEUTROPHILS # BLD AUTO: 14.92 X10 (3) UL (ref 1.5–7.7)
NEUTROPHILS # BLD AUTO: 14.92 X10(3) UL (ref 1.5–7.7)
NEUTROPHILS # BLD AUTO: 2.79 X10 (3) UL (ref 1.5–7.7)
NEUTROPHILS # BLD AUTO: 2.79 X10(3) UL (ref 1.5–7.7)
NEUTROPHILS # BLD AUTO: 6.35 X10 (3) UL (ref 1.5–7.7)
NEUTROPHILS # BLD AUTO: 7.08 X10 (3) UL (ref 1.5–7.7)
NEUTROPHILS # BLD AUTO: 7.21 X10 (3) UL (ref 1.5–7.7)
NEUTROPHILS # BLD AUTO: 7.21 X10(3) UL (ref 1.5–7.7)
NEUTROPHILS NFR BLD AUTO: 60.5 %
NEUTROPHILS NFR BLD AUTO: 75.5 %
NEUTROPHILS NFR BLD AUTO: 85.7 %
NEUTROPHILS NFR BLD AUTO: 92.2 %
NEUTROPHILS NFR BLD: 63 %
NEUTROPHILS NFR BLD: 68 %
NEUTS BAND NFR BLD: 1 %
NEUTS BAND NFR BLD: 4 %
NEUTS HYPERSEG # BLD: 6.55 X10(3) UL (ref 1.5–7.7)
NEUTS HYPERSEG # BLD: 7.1 X10(3) UL (ref 1.5–7.7)
NRBC BLD MANUAL-RTO: 2 %
OSMOLALITY SERPL CALC.SUM OF ELEC: 280 MOSM/KG (ref 275–295)
OSMOLALITY SERPL CALC.SUM OF ELEC: 283 MOSM/KG (ref 275–295)
OSMOLALITY SERPL CALC.SUM OF ELEC: 284 MOSM/KG (ref 275–295)
OSMOLALITY SERPL CALC.SUM OF ELEC: 286 MOSM/KG (ref 275–295)
OSMOLALITY SERPL CALC.SUM OF ELEC: 287 MOSM/KG (ref 275–295)
PATIENT FASTING Y/N/NP: NO
PATIENT FASTING Y/N/NP: NO
PLATELET # BLD AUTO: 188 10(3)UL (ref 150–450)
PLATELET # BLD AUTO: 232 10(3)UL (ref 150–450)
PLATELET # BLD AUTO: 251 10(3)UL (ref 150–450)
PLATELET # BLD AUTO: 374 10(3)UL (ref 150–450)
PLATELET # BLD AUTO: 397 10(3)UL (ref 150–450)
PLATELET # BLD AUTO: 487 10(3)UL (ref 150–450)
PLATELET MORPHOLOGY: NORMAL
POTASSIUM SERPL-SCNC: 3 MMOL/L (ref 3.5–5.1)
POTASSIUM SERPL-SCNC: 3.4 MMOL/L (ref 3.5–5.1)
POTASSIUM SERPL-SCNC: 3.6 MMOL/L (ref 3.5–5.1)
POTASSIUM SERPL-SCNC: 3.7 MMOL/L (ref 3.5–5.1)
POTASSIUM SERPL-SCNC: 3.9 MMOL/L (ref 3.5–5.1)
POTASSIUM SERPL-SCNC: 4.3 MMOL/L (ref 3.5–5.1)
PSA SERPL DL<=0.01 NG/ML-MCNC: 14.3 SECONDS (ref 12–14.3)
RBC # BLD AUTO: 3.08 X10(6)UL
RBC # BLD AUTO: 3.11 X10(6)UL
RBC # BLD AUTO: 3.45 X10(6)UL
RBC # BLD AUTO: 3.48 X10(6)UL
RBC # BLD AUTO: 3.51 X10(6)UL
RBC # BLD AUTO: 3.68 X10(6)UL
RH BLOOD TYPE: POSITIVE
SARS-COV-2 RNA RESP QL NAA+PROBE: NOT DETECTED
SARS-COV-2 RNA RESP QL NAA+PROBE: NOT DETECTED
SODIUM SERPL-SCNC: 134 MMOL/L (ref 136–145)
SODIUM SERPL-SCNC: 136 MMOL/L (ref 136–145)
SODIUM SERPL-SCNC: 137 MMOL/L (ref 136–145)
TOTAL CELLS COUNTED: 100
TOTAL CELLS COUNTED: 100
WBC # BLD AUTO: 11.1 X10(3) UL (ref 4–11)
WBC # BLD AUTO: 15.4 X10(3) UL (ref 4–11)
WBC # BLD AUTO: 17.4 X10(3) UL (ref 4–11)
WBC # BLD AUTO: 4.6 X10(3) UL (ref 4–11)
WBC # BLD AUTO: 9.1 X10(3) UL (ref 4–11)
WBC # BLD AUTO: 9.6 X10(3) UL (ref 4–11)

## 2021-01-01 PROCEDURE — 99291 CRITICAL CARE FIRST HOUR: CPT | Performed by: OTHER

## 2021-01-01 PROCEDURE — 99214 OFFICE O/P EST MOD 30 MIN: CPT | Performed by: INTERNAL MEDICINE

## 2021-01-01 PROCEDURE — 49418 INSERT TUN IP CATH PERC: CPT

## 2021-01-01 PROCEDURE — 85610 PROTHROMBIN TIME: CPT

## 2021-01-01 PROCEDURE — 82378 CARCINOEMBRYONIC ANTIGEN: CPT

## 2021-01-01 PROCEDURE — 85007 BL SMEAR W/DIFF WBC COUNT: CPT

## 2021-01-01 PROCEDURE — 96413 CHEMO IV INFUSION 1 HR: CPT

## 2021-01-01 PROCEDURE — 0W9930Z DRAINAGE OF RIGHT PLEURAL CAVITY WITH DRAINAGE DEVICE, PERCUTANEOUS APPROACH: ICD-10-PCS | Performed by: RADIOLOGY

## 2021-01-01 PROCEDURE — 99213 OFFICE O/P EST LOW 20 MIN: CPT | Performed by: NEUROLOGICAL SURGERY

## 2021-01-01 PROCEDURE — 97110 THERAPEUTIC EXERCISES: CPT

## 2021-01-01 PROCEDURE — 97530 THERAPEUTIC ACTIVITIES: CPT

## 2021-01-01 PROCEDURE — 3080F DIAST BP >= 90 MM HG: CPT | Performed by: OTHER

## 2021-01-01 PROCEDURE — 80053 COMPREHEN METABOLIC PANEL: CPT

## 2021-01-01 PROCEDURE — 93306 TTE W/DOPPLER COMPLETE: CPT | Performed by: INTERNAL MEDICINE

## 2021-01-01 PROCEDURE — 99232 SBSQ HOSP IP/OBS MODERATE 35: CPT | Performed by: HOSPITALIST

## 2021-01-01 PROCEDURE — 71260 CT THORAX DX C+: CPT | Performed by: INTERNAL MEDICINE

## 2021-01-01 PROCEDURE — 85025 COMPLETE CBC W/AUTO DIFF WBC: CPT

## 2021-01-01 PROCEDURE — 83540 ASSAY OF IRON: CPT

## 2021-01-01 PROCEDURE — 99225 SUBSEQUENT OBSERVATION CARE: CPT | Performed by: INTERNAL MEDICINE

## 2021-01-01 PROCEDURE — 36591 DRAW BLOOD OFF VENOUS DEVICE: CPT

## 2021-01-01 PROCEDURE — 96376 TX/PRO/DX INJ SAME DRUG ADON: CPT

## 2021-01-01 PROCEDURE — 99220 INITIAL OBSERVATION CARE,LEVL III: CPT | Performed by: HOSPITALIST

## 2021-01-01 PROCEDURE — 99217 OBSERVATION CARE DISCHARGE: CPT | Performed by: HOSPITALIST

## 2021-01-01 PROCEDURE — 8E09XBZ COMPUTER ASSISTED PROCEDURE OF HEAD AND NECK REGION: ICD-10-PCS | Performed by: NEUROLOGICAL SURGERY

## 2021-01-01 PROCEDURE — 85027 COMPLETE CBC AUTOMATED: CPT

## 2021-01-01 PROCEDURE — 00B00ZZ EXCISION OF BRAIN, OPEN APPROACH: ICD-10-PCS | Performed by: NEUROLOGICAL SURGERY

## 2021-01-01 PROCEDURE — 83735 ASSAY OF MAGNESIUM: CPT

## 2021-01-01 PROCEDURE — 99244 OFF/OP CNSLTJ NEW/EST MOD 40: CPT | Performed by: INTERNAL MEDICINE

## 2021-01-01 PROCEDURE — 74177 CT ABD & PELVIS W/CONTRAST: CPT | Performed by: INTERNAL MEDICINE

## 2021-01-01 PROCEDURE — 36415 COLL VENOUS BLD VENIPUNCTURE: CPT

## 2021-01-01 PROCEDURE — 74177 CT ABD & PELVIS W/CONTRAST: CPT | Performed by: EMERGENCY MEDICINE

## 2021-01-01 PROCEDURE — 3008F BODY MASS INDEX DOCD: CPT | Performed by: NEUROLOGICAL SURGERY

## 2021-01-01 PROCEDURE — 99213 OFFICE O/P EST LOW 20 MIN: CPT | Performed by: COLON & RECTAL SURGERY

## 2021-01-01 PROCEDURE — 96365 THER/PROPH/DIAG IV INF INIT: CPT

## 2021-01-01 PROCEDURE — 3078F DIAST BP <80 MM HG: CPT | Performed by: NEUROLOGICAL SURGERY

## 2021-01-01 PROCEDURE — 99232 SBSQ HOSP IP/OBS MODERATE 35: CPT | Performed by: INTERNAL MEDICINE

## 2021-01-01 PROCEDURE — 99024 POSTOP FOLLOW-UP VISIT: CPT | Performed by: NEUROLOGICAL SURGERY

## 2021-01-01 PROCEDURE — 87081 CULTURE SCREEN ONLY: CPT

## 2021-01-01 PROCEDURE — A9575 INJ GADOTERATE MEGLUMI 0.1ML: HCPCS | Performed by: PHYSICIAN ASSISTANT

## 2021-01-01 PROCEDURE — 96375 TX/PRO/DX INJ NEW DRUG ADDON: CPT

## 2021-01-01 PROCEDURE — 99214 OFFICE O/P EST MOD 30 MIN: CPT | Performed by: CLINICAL NURSE SPECIALIST

## 2021-01-01 PROCEDURE — 86900 BLOOD TYPING SEROLOGIC ABO: CPT

## 2021-01-01 PROCEDURE — 3079F DIAST BP 80-89 MM HG: CPT | Performed by: COLON & RECTAL SURGERY

## 2021-01-01 PROCEDURE — 99225 SUBSEQUENT OBSERVATION CARE: CPT | Performed by: HOSPITALIST

## 2021-01-01 PROCEDURE — 3008F BODY MASS INDEX DOCD: CPT | Performed by: OTHER

## 2021-01-01 PROCEDURE — 99255 IP/OBS CONSLTJ NEW/EST HI 80: CPT | Performed by: HOSPITALIST

## 2021-01-01 PROCEDURE — 72156 MRI NECK SPINE W/O & W/DYE: CPT | Performed by: PHYSICIAN ASSISTANT

## 2021-01-01 PROCEDURE — 99152 MOD SED SAME PHYS/QHP 5/>YRS: CPT

## 2021-01-01 PROCEDURE — 99242 OFF/OP CONSLTJ NEW/EST SF 20: CPT | Performed by: NURSE PRACTITIONER

## 2021-01-01 PROCEDURE — 99214 OFFICE O/P EST MOD 30 MIN: CPT | Performed by: COLON & RECTAL SURGERY

## 2021-01-01 PROCEDURE — 85730 THROMBOPLASTIN TIME PARTIAL: CPT

## 2021-01-01 PROCEDURE — 70553 MRI BRAIN STEM W/O & W/DYE: CPT | Performed by: PHYSICIAN ASSISTANT

## 2021-01-01 PROCEDURE — 99024 POSTOP FOLLOW-UP VISIT: CPT | Performed by: PHYSICIAN ASSISTANT

## 2021-01-01 PROCEDURE — 3074F SYST BP LT 130 MM HG: CPT | Performed by: NEUROLOGICAL SURGERY

## 2021-01-01 PROCEDURE — 4A10X4G MONITORING OF CENTRAL NERVOUS ELECTRICAL ACTIVITY, INTRAOPERATIVE, EXTERNAL APPROACH: ICD-10-PCS | Performed by: NEUROLOGICAL SURGERY

## 2021-01-01 PROCEDURE — 72158 MRI LUMBAR SPINE W/O & W/DYE: CPT | Performed by: PHYSICIAN ASSISTANT

## 2021-01-01 PROCEDURE — 3077F SYST BP >= 140 MM HG: CPT | Performed by: OTHER

## 2021-01-01 PROCEDURE — 3074F SYST BP LT 130 MM HG: CPT | Performed by: COLON & RECTAL SURGERY

## 2021-01-01 PROCEDURE — 3008F BODY MASS INDEX DOCD: CPT | Performed by: COLON & RECTAL SURGERY

## 2021-01-01 PROCEDURE — 99225 SUBSEQUENT OBSERVATION CARE: CPT | Performed by: COLON & RECTAL SURGERY

## 2021-01-01 PROCEDURE — 72157 MRI CHEST SPINE W/O & W/DYE: CPT | Performed by: PHYSICIAN ASSISTANT

## 2021-01-01 PROCEDURE — 83550 IRON BINDING TEST: CPT

## 2021-01-01 PROCEDURE — 86850 RBC ANTIBODY SCREEN: CPT

## 2021-01-01 PROCEDURE — 99211 OFF/OP EST MAY X REQ PHY/QHP: CPT

## 2021-01-01 PROCEDURE — 82728 ASSAY OF FERRITIN: CPT

## 2021-01-01 PROCEDURE — 99213 OFFICE O/P EST LOW 20 MIN: CPT | Performed by: PHYSICIAN ASSISTANT

## 2021-01-01 PROCEDURE — 3080F DIAST BP >= 90 MM HG: CPT | Performed by: COLON & RECTAL SURGERY

## 2021-01-01 PROCEDURE — 80051 ELECTROLYTE PANEL: CPT

## 2021-01-01 PROCEDURE — 86901 BLOOD TYPING SEROLOGIC RH(D): CPT

## 2021-01-01 DEVICE — DURAMATRIX 2X2 SUTURABLE: Type: IMPLANTABLE DEVICE | Site: HEAD | Status: FUNCTIONAL

## 2021-01-01 DEVICE — STRAIGHT PLATE, 12MM BAR
Type: IMPLANTABLE DEVICE | Site: HEAD | Status: FUNCTIONAL
Brand: UNIVERSAL NEURO 3

## 2021-01-01 DEVICE — DURAL SEALANT: Type: IMPLANTABLE DEVICE | Site: HEAD | Status: FUNCTIONAL

## 2021-01-01 DEVICE — SCREW, AXS, SELF-DRILLING
Type: IMPLANTABLE DEVICE | Site: HEAD | Status: FUNCTIONAL
Brand: UNIVERSAL NEURO 3

## 2021-01-01 DEVICE — BURR HOLE COVER, WITH TAB, 14MM
Type: IMPLANTABLE DEVICE | Site: HEAD | Status: FUNCTIONAL
Brand: UNIVERSAL NEURO 3

## 2021-01-01 RX ORDER — DIAZEPAM 5 MG/1
5 TABLET ORAL EVERY 6 HOURS PRN
Status: DISCONTINUED | OUTPATIENT
Start: 2021-01-01 | End: 2021-01-01

## 2021-01-01 RX ORDER — DEXAMETHASONE 2 MG/1
2 TABLET ORAL
Qty: 30 TABLET | Refills: 0 | Status: SHIPPED | OUTPATIENT
Start: 2021-01-01 | End: 2021-01-01

## 2021-01-01 RX ORDER — CYCLOBENZAPRINE HCL 10 MG
10 TABLET ORAL 3 TIMES DAILY PRN
Status: DISCONTINUED | OUTPATIENT
Start: 2021-01-01 | End: 2021-01-01

## 2021-01-01 RX ORDER — ONDANSETRON 4 MG/1
4 TABLET, FILM COATED ORAL EVERY 8 HOURS PRN
Status: DISCONTINUED | OUTPATIENT
Start: 2021-01-01 | End: 2021-01-01

## 2021-01-01 RX ORDER — HYOSCYAMINE SULFATE 0.125 MG
0.12 TABLET,DISINTEGRATING ORAL EVERY 4 HOURS PRN
COMMUNITY

## 2021-01-01 RX ORDER — PANTOPRAZOLE SODIUM 40 MG/1
40 TABLET, DELAYED RELEASE ORAL
Status: DISCONTINUED | OUTPATIENT
Start: 2021-01-01 | End: 2021-01-01

## 2021-01-01 RX ORDER — FUROSEMIDE 10 MG/ML
10 INJECTION INTRAMUSCULAR; INTRAVENOUS ONCE
Status: COMPLETED | OUTPATIENT
Start: 2021-01-01 | End: 2021-01-01

## 2021-01-01 RX ORDER — SODIUM CHLORIDE 9 MG/ML
INJECTION, SOLUTION INTRAVENOUS CONTINUOUS
Status: DISCONTINUED | OUTPATIENT
Start: 2021-01-01 | End: 2021-01-01

## 2021-01-01 RX ORDER — PROCHLORPERAZINE MALEATE 10 MG
10 TABLET ORAL EVERY 6 HOURS PRN
Qty: 30 TABLET | Refills: 3 | Status: SHIPPED | OUTPATIENT
Start: 2021-01-01

## 2021-01-01 RX ORDER — HYDROMORPHONE HYDROCHLORIDE 1 MG/ML
0.8 INJECTION, SOLUTION INTRAMUSCULAR; INTRAVENOUS; SUBCUTANEOUS EVERY 2 HOUR PRN
Status: DISCONTINUED | OUTPATIENT
Start: 2021-01-01 | End: 2021-01-01

## 2021-01-01 RX ORDER — DEXAMETHASONE SODIUM PHOSPHATE 4 MG/ML
2 VIAL (ML) INJECTION EVERY 12 HOURS
Status: DISCONTINUED | OUTPATIENT
Start: 2021-01-01 | End: 2021-01-01

## 2021-01-01 RX ORDER — DEXAMETHASONE SODIUM PHOSPHATE 4 MG/ML
4 VIAL (ML) INJECTION AS NEEDED
Status: ACTIVE | OUTPATIENT
Start: 2021-01-01 | End: 2021-01-01

## 2021-01-01 RX ORDER — SODIUM CHLORIDE, SODIUM LACTATE, POTASSIUM CHLORIDE, CALCIUM CHLORIDE 600; 310; 30; 20 MG/100ML; MG/100ML; MG/100ML; MG/100ML
INJECTION, SOLUTION INTRAVENOUS CONTINUOUS
Status: DISCONTINUED | OUTPATIENT
Start: 2021-01-01 | End: 2021-01-01

## 2021-01-01 RX ORDER — LABETALOL HYDROCHLORIDE 5 MG/ML
10 INJECTION, SOLUTION INTRAVENOUS ONCE
Status: COMPLETED | OUTPATIENT
Start: 2021-01-01 | End: 2021-01-01

## 2021-01-01 RX ORDER — HYDRALAZINE HYDROCHLORIDE 25 MG/1
25 TABLET, FILM COATED ORAL ONCE
Status: COMPLETED | OUTPATIENT
Start: 2021-01-01 | End: 2021-01-01

## 2021-01-01 RX ORDER — ACETAMINOPHEN 500 MG
1000 TABLET ORAL EVERY 6 HOURS PRN
Status: DISCONTINUED | OUTPATIENT
Start: 2021-01-01 | End: 2021-01-01

## 2021-01-01 RX ORDER — POLYETHYLENE GLYCOL 3350 17 G/17G
17 POWDER, FOR SOLUTION ORAL DAILY PRN
Status: DISCONTINUED | OUTPATIENT
Start: 2021-01-01 | End: 2021-01-01

## 2021-01-01 RX ORDER — MIDAZOLAM HYDROCHLORIDE 1 MG/ML
INJECTION INTRAMUSCULAR; INTRAVENOUS
Status: COMPLETED
Start: 2021-01-01 | End: 2021-01-01

## 2021-01-01 RX ORDER — BUPIVACAINE HYDROCHLORIDE AND EPINEPHRINE 5; 5 MG/ML; UG/ML
INJECTION, SOLUTION EPIDURAL; INTRACAUDAL; PERINEURAL AS NEEDED
Status: DISCONTINUED | OUTPATIENT
Start: 2021-01-01 | End: 2021-01-01 | Stop reason: HOSPADM

## 2021-01-01 RX ORDER — HYDROMORPHONE HYDROCHLORIDE 1 MG/ML
0.2 INJECTION, SOLUTION INTRAMUSCULAR; INTRAVENOUS; SUBCUTANEOUS EVERY 2 HOUR PRN
Status: DISCONTINUED | OUTPATIENT
Start: 2021-01-01 | End: 2021-01-01

## 2021-01-01 RX ORDER — ONDANSETRON HYDROCHLORIDE 8 MG/1
8 TABLET, FILM COATED ORAL EVERY 8 HOURS PRN
Qty: 30 TABLET | Refills: 3 | Status: SHIPPED | OUTPATIENT
Start: 2021-01-01

## 2021-01-01 RX ORDER — METOCLOPRAMIDE HYDROCHLORIDE 5 MG/ML
10 INJECTION INTRAMUSCULAR; INTRAVENOUS AS NEEDED
Status: DISPENSED | OUTPATIENT
Start: 2021-01-01 | End: 2021-01-01

## 2021-01-01 RX ORDER — FUROSEMIDE 10 MG/ML
20 INJECTION INTRAMUSCULAR; INTRAVENOUS
Status: COMPLETED | OUTPATIENT
Start: 2021-01-01 | End: 2021-01-01

## 2021-01-01 RX ORDER — DEXAMETHASONE SODIUM PHOSPHATE 4 MG/ML
VIAL (ML) INJECTION AS NEEDED
Status: DISCONTINUED | OUTPATIENT
Start: 2021-01-01 | End: 2021-01-01 | Stop reason: SURG

## 2021-01-01 RX ORDER — LABETALOL HYDROCHLORIDE 5 MG/ML
10 INJECTION, SOLUTION INTRAVENOUS AS NEEDED
Status: DISCONTINUED | OUTPATIENT
Start: 2021-01-01 | End: 2021-01-01

## 2021-01-01 RX ORDER — DEXAMETHASONE 4 MG/1
TABLET ORAL
Qty: 60 TABLET | Refills: 0 | Status: ON HOLD | OUTPATIENT
Start: 2021-01-01 | End: 2021-01-01

## 2021-01-01 RX ORDER — SODIUM CHLORIDE 0.9 % (FLUSH) 0.9 %
SYRINGE (ML) INJECTION AS NEEDED
Status: DISCONTINUED | OUTPATIENT
Start: 2021-01-01 | End: 2021-01-01 | Stop reason: HOSPADM

## 2021-01-01 RX ORDER — PROCHLORPERAZINE EDISYLATE 5 MG/ML
10 INJECTION INTRAMUSCULAR; INTRAVENOUS EVERY 6 HOURS PRN
Status: DISCONTINUED | OUTPATIENT
Start: 2021-01-01 | End: 2021-01-01

## 2021-01-01 RX ORDER — LISINOPRIL 10 MG/1
10 TABLET ORAL DAILY
Status: DISCONTINUED | OUTPATIENT
Start: 2021-01-01 | End: 2021-01-01

## 2021-01-01 RX ORDER — HEPARIN SODIUM 5000 [USP'U]/ML
5000 INJECTION, SOLUTION INTRAVENOUS; SUBCUTANEOUS EVERY 12 HOURS SCHEDULED
Status: DISCONTINUED | OUTPATIENT
Start: 2021-01-01 | End: 2021-01-01

## 2021-01-01 RX ORDER — HYDROCODONE BITARTRATE AND ACETAMINOPHEN 10; 325 MG/1; MG/1
1-2 TABLET ORAL EVERY 4 HOURS PRN
Qty: 120 TABLET | Refills: 0 | Status: SHIPPED | OUTPATIENT
Start: 2021-01-01 | End: 2021-01-01

## 2021-01-01 RX ORDER — NICOTINE POLACRILEX 4 MG/1
GUM, CHEWING ORAL
Qty: 30 TABLET | Refills: 0 | Status: SHIPPED | OUTPATIENT
Start: 2021-01-01 | End: 2021-01-01

## 2021-01-01 RX ORDER — DEXAMETHASONE SODIUM PHOSPHATE 4 MG/ML
2 VIAL (ML) INJECTION DAILY
Status: DISCONTINUED | OUTPATIENT
Start: 2021-01-01 | End: 2021-01-01

## 2021-01-01 RX ORDER — ROCURONIUM BROMIDE 10 MG/ML
INJECTION, SOLUTION INTRAVENOUS AS NEEDED
Status: DISCONTINUED | OUTPATIENT
Start: 2021-01-01 | End: 2021-01-01 | Stop reason: SURG

## 2021-01-01 RX ORDER — POTASSIUM CHLORIDE 750 MG/1
20 TABLET, FILM COATED, EXTENDED RELEASE ORAL 2 TIMES DAILY
Qty: 120 TABLET | Refills: 3 | Status: SHIPPED | OUTPATIENT
Start: 2021-01-01 | End: 2021-01-01 | Stop reason: CLARIF

## 2021-01-01 RX ORDER — LIDOCAINE HYDROCHLORIDE AND EPINEPHRINE 10; 10 MG/ML; UG/ML
INJECTION, SOLUTION INFILTRATION; PERINEURAL
Status: COMPLETED
Start: 2021-01-01 | End: 2021-01-01

## 2021-01-01 RX ORDER — HYDRALAZINE HYDROCHLORIDE 50 MG/1
50 TABLET, FILM COATED ORAL EVERY 8 HOURS SCHEDULED
Qty: 90 TABLET | Refills: 0 | Status: SHIPPED | OUTPATIENT
Start: 2021-01-01 | End: 2021-01-01

## 2021-01-01 RX ORDER — HYDROCODONE BITARTRATE AND ACETAMINOPHEN 10; 325 MG/1; MG/1
2 TABLET ORAL EVERY 4 HOURS PRN
Status: DISCONTINUED | OUTPATIENT
Start: 2021-01-01 | End: 2021-01-01

## 2021-01-01 RX ORDER — MEPERIDINE HYDROCHLORIDE 25 MG/ML
12.5 INJECTION INTRAMUSCULAR; INTRAVENOUS; SUBCUTANEOUS AS NEEDED
Status: DISCONTINUED | OUTPATIENT
Start: 2021-01-01 | End: 2021-01-01

## 2021-01-01 RX ORDER — DEXAMETHASONE SODIUM PHOSPHATE 4 MG/ML
4 VIAL (ML) INJECTION EVERY 12 HOURS
Status: DISCONTINUED | OUTPATIENT
Start: 2021-01-01 | End: 2021-01-01

## 2021-01-01 RX ORDER — HYDRALAZINE HYDROCHLORIDE 20 MG/ML
10 INJECTION INTRAMUSCULAR; INTRAVENOUS
Status: DISCONTINUED | OUTPATIENT
Start: 2021-01-01 | End: 2021-01-01

## 2021-01-01 RX ORDER — NALOXONE HYDROCHLORIDE 0.4 MG/ML
80 INJECTION, SOLUTION INTRAMUSCULAR; INTRAVENOUS; SUBCUTANEOUS AS NEEDED
Status: ACTIVE | OUTPATIENT
Start: 2021-01-01 | End: 2021-01-01

## 2021-01-01 RX ORDER — LABETALOL HYDROCHLORIDE 5 MG/ML
10 INJECTION, SOLUTION INTRAVENOUS EVERY 2 HOUR PRN
Status: DISCONTINUED | OUTPATIENT
Start: 2021-01-01 | End: 2021-01-01

## 2021-01-01 RX ORDER — LABETALOL HYDROCHLORIDE 5 MG/ML
10 INJECTION, SOLUTION INTRAVENOUS EVERY 4 HOURS PRN
Status: DISCONTINUED | OUTPATIENT
Start: 2021-01-01 | End: 2021-01-01

## 2021-01-01 RX ORDER — ONDANSETRON 2 MG/ML
4 INJECTION INTRAMUSCULAR; INTRAVENOUS EVERY 6 HOURS PRN
Status: DISCONTINUED | OUTPATIENT
Start: 2021-01-01 | End: 2021-01-01

## 2021-01-01 RX ORDER — FAMOTIDINE 10 MG/ML
20 INJECTION, SOLUTION INTRAVENOUS 2 TIMES DAILY
Status: DISCONTINUED | OUTPATIENT
Start: 2021-01-01 | End: 2021-01-01

## 2021-01-01 RX ORDER — HYDROMORPHONE HYDROCHLORIDE 1 MG/ML
0.4 INJECTION, SOLUTION INTRAMUSCULAR; INTRAVENOUS; SUBCUTANEOUS EVERY 2 HOUR PRN
Status: DISCONTINUED | OUTPATIENT
Start: 2021-01-01 | End: 2021-01-01

## 2021-01-01 RX ORDER — HYDRALAZINE HYDROCHLORIDE 25 MG/1
25 TABLET, FILM COATED ORAL EVERY 8 HOURS SCHEDULED
Status: DISCONTINUED | OUTPATIENT
Start: 2021-01-01 | End: 2021-01-01

## 2021-01-01 RX ORDER — LABETALOL HYDROCHLORIDE 5 MG/ML
INJECTION, SOLUTION INTRAVENOUS AS NEEDED
Status: DISCONTINUED | OUTPATIENT
Start: 2021-01-01 | End: 2021-01-01 | Stop reason: SURG

## 2021-01-01 RX ORDER — POTASSIUM CHLORIDE 20 MEQ/1
40 TABLET, EXTENDED RELEASE ORAL ONCE
Status: COMPLETED | OUTPATIENT
Start: 2021-01-01 | End: 2021-01-01

## 2021-01-01 RX ORDER — CYCLOBENZAPRINE HCL 10 MG
10 TABLET ORAL 3 TIMES DAILY PRN
Qty: 90 TABLET | Refills: 3 | Status: SHIPPED | OUTPATIENT
Start: 2021-01-01

## 2021-01-01 RX ORDER — POTASSIUM CHLORIDE 750 MG/1
20 TABLET, EXTENDED RELEASE ORAL DAILY
COMMUNITY

## 2021-01-01 RX ORDER — POTASSIUM CHLORIDE 750 MG/1
TABLET, FILM COATED, EXTENDED RELEASE ORAL
Qty: 120 TABLET | Refills: 3 | OUTPATIENT
Start: 2021-01-01

## 2021-01-01 RX ORDER — THIAMINE HCL 100 MG
500 TABLET ORAL DAILY
COMMUNITY

## 2021-01-01 RX ORDER — SODIUM CHLORIDE 9 MG/ML
INJECTION, SOLUTION INTRAVENOUS CONTINUOUS
Status: DISCONTINUED | OUTPATIENT
Start: 2021-01-01 | End: 2021-01-01 | Stop reason: HOSPADM

## 2021-01-01 RX ORDER — FUROSEMIDE 10 MG/ML
20 INJECTION INTRAMUSCULAR; INTRAVENOUS ONCE
Status: COMPLETED | OUTPATIENT
Start: 2021-01-01 | End: 2021-01-01

## 2021-01-01 RX ORDER — DOCUSATE SODIUM 100 MG/1
100 CAPSULE, LIQUID FILLED ORAL 2 TIMES DAILY
Status: DISCONTINUED | OUTPATIENT
Start: 2021-01-01 | End: 2021-01-01

## 2021-01-01 RX ORDER — HYDROMORPHONE HYDROCHLORIDE 1 MG/ML
0.4 INJECTION, SOLUTION INTRAMUSCULAR; INTRAVENOUS; SUBCUTANEOUS EVERY 5 MIN PRN
Status: ACTIVE | OUTPATIENT
Start: 2021-01-01 | End: 2021-01-01

## 2021-01-01 RX ORDER — HYDROCODONE BITARTRATE AND ACETAMINOPHEN 10; 325 MG/1; MG/1
1 TABLET ORAL EVERY 4 HOURS PRN
Status: DISCONTINUED | OUTPATIENT
Start: 2021-01-01 | End: 2021-01-01

## 2021-01-01 RX ORDER — CEFAZOLIN SODIUM 1 G/3ML
INJECTION, POWDER, FOR SOLUTION INTRAMUSCULAR; INTRAVENOUS
Status: COMPLETED
Start: 2021-01-01 | End: 2021-01-01

## 2021-01-01 RX ORDER — PANTOPRAZOLE SODIUM 40 MG/1
40 TABLET, DELAYED RELEASE ORAL
COMMUNITY
Start: 2021-01-01 | End: 2021-01-01

## 2021-01-01 RX ORDER — CEFAZOLIN SODIUM/WATER 2 G/20 ML
2 SYRINGE (ML) INTRAVENOUS EVERY 8 HOURS
Status: COMPLETED | OUTPATIENT
Start: 2021-01-01 | End: 2021-01-01

## 2021-01-01 RX ORDER — LIDOCAINE HYDROCHLORIDE 10 MG/ML
INJECTION, SOLUTION EPIDURAL; INFILTRATION; INTRACAUDAL; PERINEURAL AS NEEDED
Status: DISCONTINUED | OUTPATIENT
Start: 2021-01-01 | End: 2021-01-01 | Stop reason: SURG

## 2021-01-01 RX ORDER — LABETALOL HYDROCHLORIDE 5 MG/ML
5 INJECTION, SOLUTION INTRAVENOUS EVERY 5 MIN PRN
Status: DISCONTINUED | OUTPATIENT
Start: 2021-01-01 | End: 2021-01-01

## 2021-01-01 RX ORDER — ACETAMINOPHEN 500 MG
1000 TABLET ORAL ONCE
Status: CANCELLED | OUTPATIENT
Start: 2021-01-01 | End: 2021-01-01

## 2021-01-01 RX ORDER — HYDROCODONE BITARTRATE AND ACETAMINOPHEN 5; 325 MG/1; MG/1
2 TABLET ORAL AS NEEDED
Status: DISCONTINUED | OUTPATIENT
Start: 2021-01-01 | End: 2021-01-01

## 2021-01-01 RX ORDER — ONDANSETRON 2 MG/ML
INJECTION INTRAMUSCULAR; INTRAVENOUS AS NEEDED
Status: DISCONTINUED | OUTPATIENT
Start: 2021-01-01 | End: 2021-01-01 | Stop reason: SURG

## 2021-01-01 RX ORDER — HYDRALAZINE HYDROCHLORIDE 50 MG/1
50 TABLET, FILM COATED ORAL EVERY 8 HOURS SCHEDULED
Status: DISCONTINUED | OUTPATIENT
Start: 2021-01-01 | End: 2021-01-01

## 2021-01-01 RX ORDER — FAMOTIDINE 20 MG/1
20 TABLET ORAL 2 TIMES DAILY
Status: DISCONTINUED | OUTPATIENT
Start: 2021-01-01 | End: 2021-01-01

## 2021-01-01 RX ORDER — HYDROCODONE BITARTRATE AND ACETAMINOPHEN 10; 325 MG/1; MG/1
1-2 TABLET ORAL EVERY 4 HOURS PRN
Qty: 20 TABLET | Refills: 0 | Status: SHIPPED | OUTPATIENT
Start: 2021-01-01 | End: 2021-01-01

## 2021-01-01 RX ORDER — CEFAZOLIN SODIUM/WATER 2 G/20 ML
2 SYRINGE (ML) INTRAVENOUS ONCE
Status: COMPLETED | OUTPATIENT
Start: 2021-01-01 | End: 2021-01-01

## 2021-01-01 RX ORDER — HYDROCODONE BITARTRATE AND ACETAMINOPHEN 5; 325 MG/1; MG/1
1 TABLET ORAL AS NEEDED
Status: DISCONTINUED | OUTPATIENT
Start: 2021-01-01 | End: 2021-01-01

## 2021-01-01 RX ORDER — POTASSIUM CHLORIDE 1.5 G/1.77G
40 POWDER, FOR SOLUTION ORAL ONCE
Status: DISCONTINUED | OUTPATIENT
Start: 2021-01-01 | End: 2021-01-01

## 2021-01-01 RX ORDER — DIPHENHYDRAMINE HYDROCHLORIDE 50 MG/ML
12.5 INJECTION INTRAMUSCULAR; INTRAVENOUS AS NEEDED
Status: ACTIVE | OUTPATIENT
Start: 2021-01-01 | End: 2021-01-01

## 2021-01-01 RX ORDER — HYDRALAZINE HYDROCHLORIDE 20 MG/ML
10 INJECTION INTRAMUSCULAR; INTRAVENOUS EVERY 4 HOURS PRN
Status: DISCONTINUED | OUTPATIENT
Start: 2021-01-01 | End: 2021-01-01

## 2021-01-01 RX ORDER — LIDOCAINE HYDROCHLORIDE 10 MG/ML
INJECTION, SOLUTION INFILTRATION; PERINEURAL
Status: COMPLETED
Start: 2021-01-01 | End: 2021-01-01

## 2021-01-01 RX ORDER — MIDAZOLAM HYDROCHLORIDE 1 MG/ML
1 INJECTION INTRAMUSCULAR; INTRAVENOUS EVERY 5 MIN PRN
Status: ACTIVE | OUTPATIENT
Start: 2021-01-01 | End: 2021-01-01

## 2021-01-01 RX ORDER — ONDANSETRON 2 MG/ML
4 INJECTION INTRAMUSCULAR; INTRAVENOUS AS NEEDED
Status: DISPENSED | OUTPATIENT
Start: 2021-01-01 | End: 2021-01-01

## 2021-01-01 RX ORDER — PANTOPRAZOLE SODIUM 40 MG/1
40 TABLET, DELAYED RELEASE ORAL
Qty: 30 TABLET | Refills: 0 | Status: SHIPPED | OUTPATIENT
Start: 2021-01-01 | End: 2021-01-01

## 2021-01-01 RX ADMIN — CEFAZOLIN SODIUM/WATER 2 G: 2 G/20 ML SYRINGE (ML) INTRAVENOUS at 15:41:00

## 2021-01-01 RX ADMIN — SODIUM CHLORIDE: 9 INJECTION, SOLUTION INTRAVENOUS at 14:07:00

## 2021-01-01 RX ADMIN — SODIUM CHLORIDE: 9 INJECTION, SOLUTION INTRAVENOUS at 07:48:00

## 2021-01-01 RX ADMIN — ROCURONIUM BROMIDE 25 MG: 10 INJECTION, SOLUTION INTRAVENOUS at 13:52:00

## 2021-01-01 RX ADMIN — CEFAZOLIN SODIUM/WATER 2 G: 2 G/20 ML SYRINGE (ML) INTRAVENOUS at 11:53:00

## 2021-01-01 RX ADMIN — ROCURONIUM BROMIDE 25 MG: 10 INJECTION, SOLUTION INTRAVENOUS at 12:55:00

## 2021-01-01 RX ADMIN — LIDOCAINE HYDROCHLORIDE 50 MG: 10 INJECTION, SOLUTION EPIDURAL; INFILTRATION; INTRACAUDAL; PERINEURAL at 07:39:00

## 2021-01-01 RX ADMIN — ROCURONIUM BROMIDE 10 MG: 10 INJECTION, SOLUTION INTRAVENOUS at 14:13:00

## 2021-01-01 RX ADMIN — ROCURONIUM BROMIDE 25 MG: 10 INJECTION, SOLUTION INTRAVENOUS at 08:30:00

## 2021-01-01 RX ADMIN — ROCURONIUM BROMIDE 50 MG: 10 INJECTION, SOLUTION INTRAVENOUS at 07:40:00

## 2021-01-01 RX ADMIN — SODIUM CHLORIDE: 9 INJECTION, SOLUTION INTRAVENOUS at 11:41:00

## 2021-01-01 RX ADMIN — ONDANSETRON 4 MG: 2 INJECTION INTRAMUSCULAR; INTRAVENOUS at 13:52:00

## 2021-01-01 RX ADMIN — DEXAMETHASONE SODIUM PHOSPHATE 4 MG: 4 MG/ML VIAL (ML) INJECTION at 13:52:00

## 2021-01-01 RX ADMIN — SODIUM CHLORIDE: 9 INJECTION, SOLUTION INTRAVENOUS at 13:18:00

## 2021-01-01 RX ADMIN — ROCURONIUM BROMIDE 25 MG: 10 INJECTION, SOLUTION INTRAVENOUS at 10:37:00

## 2021-01-01 RX ADMIN — LABETALOL HYDROCHLORIDE 10 MG: 5 INJECTION, SOLUTION INTRAVENOUS at 15:33:00

## 2021-01-01 RX ADMIN — ROCURONIUM BROMIDE 25 MG: 10 INJECTION, SOLUTION INTRAVENOUS at 11:59:00

## 2021-01-01 RX ADMIN — SODIUM CHLORIDE: 9 INJECTION, SOLUTION INTRAVENOUS at 16:24:00

## 2021-01-01 RX ADMIN — ROCURONIUM BROMIDE 15 MG: 10 INJECTION, SOLUTION INTRAVENOUS at 14:53:00

## 2021-01-01 RX ADMIN — CEFAZOLIN SODIUM/WATER 2 G: 2 G/20 ML SYRINGE (ML) INTRAVENOUS at 07:54:00

## 2021-01-01 RX ADMIN — SODIUM CHLORIDE: 9 INJECTION, SOLUTION INTRAVENOUS at 12:00:00

## 2021-01-05 NOTE — PROGRESS NOTES
Patient: Christiano Zuñiga  Medical Record Number: [id number]  YOB: 1965  PCP: Alma Chen MD    Reason for visit: Hx of colon CA, brain metastases, imaging review and follow up with Neuro Conference    HISTORY OF CHIEF COMPLAINT:    Tre Esparza level: Not on file    Tobacco Use      Smoking status: Never Smoker      Smokeless tobacco: Never Used    Substance and Sexual Activity      Alcohol use: Yes        Frequency: Never        Comment: 1 beer per day      Drug use: No      Sexual activity: Yes with multi-planar T1, T2-weighted images with FLAIR sequences and diffusion weighted images without and with infusion. PATIENT STATED HISTORY:(As transcribed by Technologist)  Patient has a brain tumor that is being monitored.   Patient has been experie cerebellar vermis measuring approximately 3.2 x 2.5 x 2.8 cm, previously 3.0 x 2.3 x 2.6 cm. There is also increasing   surrounding moderate vasogenic edema with a reference measurement on the FLAIR imaging of 7.2 x 4.7 cm, previously 6.4 x 3.3 cm.   Neel Baumann 1 x 0.9 cm). 6. Left lower lobe left costophrenic angle nodule on image 88 measures 1.8 x 1.1 cm (previously 1.8 x 1.1 cm). There are otherwise no new nodules detected. MEDIASTINUM:  No mass or adenopathy.     JASMIN:  Left hilar lymph node anteriorly on There is interval development of   adenopathy in the yamilka hepatis with a lymph node on image 171 measuring 1.5 x 1.4 cm. ABDOMINAL WALL:  Small fat containing right upper quadrant abdominal wall hernia is unchanged.   URINARY BLADDER:  No visible focal w w + wo:     - 1 month  3. Other:   - Monitor shortness of breath with walking, given lung metastases. He is scheduled to see oncology end of the month. If any new, worsening or concerning signs/symptoms, follow up as stated below.    4. Follow up 1 month af

## 2021-01-11 NOTE — PROGRESS NOTES
Follow Up Visit Note       Active Problems      1. Rectal cancer (HCC) at 15 cm between the second and third rectal folds    2. Family history of colonic polyps, in father    3.  Cancer of descending colon ([address] approximately 40 cm, within a large adenomat findings with the patient. I once again explained him that my role in his therapy is for local control. My job is to keep him out of trouble in the abdomen regarding any intraperitoneal metastases. There are no signs of intraperitoneal metastases.     I (previously 1 x 0.9 cm). 6. Left lower lobe left costophrenic angle nodule on image 88 measures 1.8 x 1.1 cm (previously 1.8 x 1.1 cm). There are otherwise no new nodules detected. MEDIASTINUM:  No mass or adenopathy.     JASMIN:  Left hilar lymph node a x 2 cm). There is interval development of   adenopathy in the yamilka hepatis with a lymph node on image 171 measuring 1.5 x 1.4 cm. ABDOMINAL WALL:  Small fat containing right upper quadrant abdominal wall hernia is unchanged.   URINARY BLADDER:  No visib Patient has a brain tumor that is being monitored. Patient has been experiencing dizziness since October. CONTRAST USED:  30 mL of Dotarem     FINDINGS:  The ventricles and sulci are within normal limits. There is no midline shift.   The basal cister on the FLAIR imaging of 7.2 x 4.7 cm, previously 6.4 x 3.3 cm. Continued close follow-up is suggested. 3. Increasing size of a metastatic focus along the paramedian left parietal lobe measuring approximately 5 x 5 x 5 mm, previously 4 x 4 x 3 mm.   The Activity      Alcohol use: Yes        Frequency: Never        Comment: 1 beer per day      Drug use: No      Sexual activity: Yes        Partners: Female    Other Topics      Concerns:    Social History Narrative      , lives with wife      Has sons no thyromegaly present. Cardiovascular: Normal rate, regular rhythm, S1 normal, S2 normal and normal heart sounds. No murmur heard. Pulmonary/Chest: Effort normal and breath sounds normal. No accessory muscle usage. No tachypnea.  No respiratory distre polyps, in father  Cancer of descending colon ([facility] [address] approximately 40 cm, within a large adenomatous polyp  Adenomatous polyp of colon, unspecified part of colon  Secondary liver cancer ([facility] [address]  Secondary carcinoma of right lung ([facility] [address]  Secondary adenocarcino ascites. I discussed all the recent imaging and findings with the patient. I once again explained him that my role in his therapy is for local control. My job is to keep him out of trouble in the abdomen regarding any intraperitoneal metastases.   Ther

## 2021-01-11 NOTE — PATIENT INSTRUCTIONS
This patient has widely metastatic colon and rectal cancer. He presents at this time for further care and treatment. The patient's presenting history is listed below:     This patient's history starts with a colonoscopy for screening purposes on August 9, chest.  I also personally reviewed his recent CT scan of the chest, abdomen, and pelvis. He absolutely has increasing size in the liver metastases. He has no evidence of ascites.   There are no signs of bowel obstruction, impairment of the small intestine

## 2021-01-21 NOTE — TELEPHONE ENCOUNTER
BIOLOGICS BY Darleen Carney, [address] [place] [address],[address] [facility] [phone], [phone] is calling to see if this patient no longer need his Tukysa 150 mg, please call back to confirm.

## 2021-01-26 NOTE — PROGRESS NOTES
[facility]    PATIENT'S NAME: Juda Leventhal   ATTENDING PHYSICIAN: Michel Yan M.D.    PATIENT ACCOUNT #: [de-identified] LOCATION: [address] [place] [address] RECORD #: [id number] YOB: 1965   DATE OF SERVICE: 01/21/2021       CANCER ROSA LUNGS:  Clear. HEART:  Normal.   ABDOMEN:  No hepatosplenomegaly or tenderness. EXTREMITIES:  He has no clubbing, cyanosis. He has trace edema. LABORATORY DATA:  His potassium is slightly low at 3.0. His BUN is normal at 6.   Creatinine 1.06.

## 2021-01-28 NOTE — PROGRESS NOTES
Dx:       Gait instability   Unsteadiness        Authorized # of Visits:  No prior authorization is required per appointment notes       Next MD visit: none scheduled  Fall Risk: standard           Precautions: n/a           Subjective:  Preston Vallecillo forward bending without balance loss  4.   Patient will demonstrate home program progression independently    Plan: Remain at present frequency; continue with resistance exercise within tolerance   Date: 01/06/2020  Tx#:  12   Date: 1/11/2021  Tx#:  13 Date -Standing with UE support on stability ball with head rotation and UE arm lifts. -Standing with UE support on stability ball with head rotation and UE arm lifts. Standing with UE support on stability ball with head rotation and UE arm lifts.  Standing with

## 2021-01-28 NOTE — PROGRESS NOTES
Dx:       Gait instability   Unsteadiness        Authorized # of Visits:  No prior authorization is required per appointment notes       Next MD visit: none scheduled  Fall Risk: standard           Precautions: n/a           Subjective:  No new issues; s Patient will demonstrate the ability to consistently transition from various surfaces heights such as when transitioning from standing/sitting without posterior balance loss  3.   Patient will demonstrate the ability to attend to changes in positions such

## 2021-01-28 NOTE — PROGRESS NOTES
Dx:       Gait instability   Unsteadiness        Authorized # of Visits:  No prior authorization is required per appointment notes       Next MD visit: none scheduled  Fall Risk: standard           Precautions: n/a           Subjective:  Daniele Zazueta and stopping, and changes in directions without balance loss  2. Patient will demonstrate the ability to consistently transition from various surfaces heights such as when transitioning from standing/sitting without posterior balance loss  3.   Patient herman rotation and UE arm lifts. -Standing with UE support on stability ball with head rotation and UE arm lifts. Standing with UE support on stability ball with head rotation and UE arm lifts. Charges:    Therapeutic excerc

## 2021-01-28 NOTE — PROGRESS NOTES
Dx:       Gait instability   Unsteadiness        Authorized # of Visits:  No prior authorization is required per appointment notes       Next MD visit: none scheduled  Fall Risk: standard           Precautions: n/a           Subjective:  No new issues; s Patient will demonstrate the ability to consistently transition from various surfaces heights such as when transitioning from standing/sitting without posterior balance loss  3.   Patient will demonstrate the ability to attend to changes in positions such a

## 2021-01-28 NOTE — PROGRESS NOTES
Dx:       Gait instability   Unsteadiness        Authorized # of Visits:  No prior authorization is required per appointment notes       Next MD visit: none scheduled  Fall Risk: standard           Precautions: n/a           Subjective:  Jesus Simmons forward bending without balance loss  4.   Patient will demonstrate home program progression independently    Plan: Remain at present frequency; continue with resistance exercise within tolerance   Date: 01/06/2020  Tx#:  12   Date: 1/11/2021  Tx#:  13 Date -Standing with UE support on stability ball with head rotation and UE arm lifts. -Standing with UE support on stability ball with head rotation and UE arm lifts. Standing with UE support on stability ball with head rotation and UE arm lifts.  Standing with

## 2021-02-04 NOTE — TELEPHONE ENCOUNTER
[facility] [employer] called. The patient states they were expecting a prescription for Protonix today and it was not received. [facility] would like to request a prescription be faxed over if appropriate.

## 2021-02-04 NOTE — TELEPHONE ENCOUNTER
Please call to schedule this patient at 10:45am with Dr. Ricky Bowen on Tuesday, 2/9/21 after Neuro Conference. Please advise the patient this is to review his imaging.  Thank you

## 2021-02-04 NOTE — TELEPHONE ENCOUNTER
TC to patient  MRI shows increased mass size, now 3.8 cm  Increased edema and perfusion  Some hemorrhage within lesion    Last decadron was >4 weeks ago  Increased ataxia  Using cane    Advised restart dex 4 mg bid until f/u to see Dr Inge Mohan and me next T

## 2021-02-09 NOTE — TELEPHONE ENCOUNTER
Discussed w/ Dr. Filiberto Mario in Neuro conf  Plan is to cont steroids, image the spine to r/o spinal lesions, restage the body per Dr Janay Johnson, then rtc early March after re-imaging brain  His symptoms are better on steroids

## 2021-02-09 NOTE — PROGRESS NOTES
Neurosurgery Clinic Visit  2021    Edda Landon PCP:  Narciso Michele MD    1965 MRN [id number]           REASON FOR VISIT: Brain metastases, imaging review, neuro conference follow up      [address] Ottoniel Aguila is a 50 Disp: , Rfl:     No current facility-administered medications on file prior to visit. REVIEW OF SYSTEMS:  Comprehensive review of systems done. Negative except what is outlined in the above HPI.       PHYSICAL EXAMINATION:  There were no vitals take workstation. Color coded   cerebral blood volume, cerebral blood flow, mean transit time and time to peak maps were generated. There is increased cerebral blood volume throughout the periphery of the dominant lesion in the infratentorial compartment. Patient to receive treatment this week. Plan to rescan CT CAP in 2 weeks. Dr. Evin Brandon and Dr. Bonny Grier to discuss at that time if patient would benefit from surgical resection of brain metastases.    3. Follow up 3/2 at 1pm for video visit after conference or c

## 2021-02-11 NOTE — TELEPHONE ENCOUNTER
Provider message noted.   Routed to [facility] to arrange office visit per Luis Cannon.

## 2021-02-11 NOTE — PROGRESS NOTES
SW met with patient at his request, in treatment area.  Patient reports that all of his questions have been answered by Dr. Alyce Terrazas-- in regards to POLST, POA etc. Donald ensured copies were scanned in patient chart and gave patient contact information for any f

## 2021-02-11 NOTE — PROGRESS NOTES
Pt here for C3D1.   Arrives Ambulating independently and Ambulating with cane, accompanied by Self           Patient reports possible pregnancy since last therapy cycle: Not Applicable    Modifications in dose or schedule: No     Frequency of blood return a

## 2021-02-11 NOTE — PROGRESS NOTES
Patient is here for MD f/u for Rectal Cancer. Restarted on Dexamethasone 4 mg twice daily one week ago. Patient has noticed mild improvement in his gait and appetite. Still has a shuffling gait at times. Using a cane to ambulate.  Occasional nausea and vomi

## 2021-02-12 NOTE — PROGRESS NOTES
[facility]    PATIENT'S NAME: Court Kil   ATTENDING PHYSICIAN: Moisés Alfred M.D.    PATIENT ACCOUNT #: [de-identified] LOCATION: [address] [place] [address] RECORD #: [id number] YOB: 1965   DATE OF SERVICE: 02/11/2021       CANCER ROSA MEDICATIONS:  His current medications include dexamethasone 4 mg b.i.d. ondansetron 8 mg q.8 h. p.r.n., pantoprazole 40 mg daily, potassium chloride 20 mEq twice daily, and rivaroxaban 20 mg daily.     PHYSICAL EXAMINATION:    GENERAL:  He is a chronica Gomez Sanchez M.D.  d: 02/12/2021 06:41:02  t: 02/12/2021 09:35:53  [facility] [id number]  EF/    cc: ALEYDA oDoley M.D. Dr. Kayren Stage, M.D.

## 2021-02-16 NOTE — TELEPHONE ENCOUNTER
Called patient regarding provider's note for possible appointment. Patient agreed to arrange video visit as recommended by Kole, Allie Bynum. Call forwarded to [facility] to arrange appointment.

## 2021-02-16 NOTE — TELEPHONE ENCOUNTER
Message route 2x by provider. No appt scheduled for patient on 3/2 1pm virtual video visit. Can we please call patient today to schedule this?

## 2021-02-26 NOTE — TELEPHONE ENCOUNTER
From: Lolita Platt  To: Anup Bowie MD  Sent: 2/26/2021 1:40 PM CST  Subject: Visit Follow-up Question    Are there any \"in-person\" appointments available for the afternoon of Tuesday, March 2nd?     Currently, I have a video visit scheduled, but

## 2021-03-01 NOTE — TELEPHONE ENCOUNTER
Called pt, offered in-person @ 11am on 03/02, pt declined stating his wife works until 3040 so he'll keep the 1pm video visit at this time

## 2021-03-01 NOTE — TELEPHONE ENCOUNTER
Called patient who confirmed appointment that is best for his schedule is the virtual visit on 3/2/21 at 1300 with Dr. Felton Ricks. Patient thanked me for touching base regarding appointment.

## 2021-03-01 NOTE — TELEPHONE ENCOUNTER
Per Julia Arias [place]:  Patient may see Dr. Margie Osuna on 3/2/21 in Neuro oncology with Dr. Margie Osuna. Routed to [facility].

## 2021-03-04 NOTE — TELEPHONE ENCOUNTER
pt calling to schedule sx as directed by Dr. Puneet Farnsworth. Stated he discussed sx with Dr. Manju Olivo in video visit on 3/2, with a date the week of March 29th.  Pt awaiting call back

## 2021-03-04 NOTE — PROGRESS NOTES
Patient is here for MD f/u and cycle 4 of chemo. Patient is on Dexamethasone 4 mg once daily. Patient is using the cane more often for balance. Patient had an MRI of the spine on 2/24 and CT scan on 2/25. Left foot and ankle edema. Appetite is good.  Jimmy Suarez

## 2021-03-04 NOTE — PROGRESS NOTES
Pt here for C4D1.   Arrives Ambulating with cane, accompanied by Self           Patient reports possible pregnancy since last therapy cycle: Not Applicable    Modifications in dose or schedule: No     Frequency of blood return and site check throughout admi

## 2021-03-05 NOTE — TELEPHONE ENCOUNTER
Pt messaged via [facility] on 3/5/21 stating     \"Looking to follow up on the scheduled surgery date. We can do March 24th, 26th, or early during the week of March 28th. Thank you! Ruth Ann Marlow"    Routed to provider for orders       Noted televisit note is still open from 3/2/21     Informed pt via [facility] that we have forwarded his request to provider and we will reach back out to him to schedule one we have received the order.

## 2021-03-08 NOTE — TELEPHONE ENCOUNTER
SUBOCCIPITAL CRANIOTOMY FOR CEREBELLAR TUMOR WITH NEURO NAVIGATION, NEURO MONITORING AND MICROSCOPE DISSECTION     Position prone     Please plan for Monday 3/29    Only day case     PCP clearance and Heme Onc clearance.  Defer to PCP for further specialty clearance

## 2021-03-08 NOTE — PROGRESS NOTES
This visit is conducted using Telemedicine with live, interactive video and audio. Patient has been referred to the [facility] website at [email or web address] to review the yearly Consent to Treat document.     Patient understands and accepts financial res vertebral body. Neither of these lesions causes significant spinal canal stenosis. The abdominal CT demonstrates decrease in size of left hepatic lobe lesion. As well as a right hepatic lobe lesion.   A periportal lymph node has decreased in size and no

## 2021-03-09 NOTE — TELEPHONE ENCOUNTER
Prior Authorization for inpatient surgery initiated with [facility] department [facility] [phone]  Requesting coverage for:Craniotomy for Tumor  Date of Service: 3/29/2021   Inpatient days requested:3  CPT codes: 30855,80469,84149,70002    Request for surgery pending review, pending reference #[id number]. Clinical notes submitted by fax# [phone], confirmation received.

## 2021-03-09 NOTE — PROGRESS NOTES
[facility]    PATIENT'S NAME: Kevan Peres   ATTENDING PHYSICIAN: Jono Lucero M.D.    PATIENT ACCOUNT #: [de-identified] LOCATION: [address] [place] [address] RECORD #: [id number] YOB: 1965   DATE OF SERVICE: 03/04/2021       CANCER ROSA MEDICATIONS:  His current medications include dexamethasone 4 mg daily, ondansetron 8 mg q.8 h. p.r.n., pantoprazole 40 mg daily, potassium chloride 20 mEq twice daily, and Xarelto 20 mg daily.     PHYSICAL EXAMINATION:    GENERAL:  He is a well-appeari anticoagulant for 72 hours preoperatively. He does have an IVC filter in place to protect him perioperatively. Dictated By Megan Tong M.D.  d: 03/08/2021 09:36:34  t: 03/08/2021 13:33:32  [facility] [id number]  AH/    cc: PRATIK Taylor

## 2021-03-12 NOTE — TELEPHONE ENCOUNTER
Prior authorization request completed for:  Craniotomy for Tumor  Authorization #[id number]  Authorization dates: 3/29/21  CPT codes approved: 63125,37391,31686,48058  Number of visits/dates of service approved: 1 4/1/21 not included  Physician: Dr Marvel Potts

## 2021-03-12 NOTE — TELEPHONE ENCOUNTER
Surgery clearance letter faxed to Dr Jonah Hodge office @ [phone] and to [facility] pre-admission dept @ [phone].

## 2021-03-15 NOTE — TELEPHONE ENCOUNTER
Fax received from Oncology Dr. Puneet Farnsworth dated 3/12/21 for anticoagulant clearance     \"Our mutual patient, Parmjit fisher 6/4/1965 is scheduled for Craniotomy for tumor. Our records show this patient is on Rivaroxaban (Xarelto)     Hold date: 3 days before surgery    Resume date: as directed by the surgeon\"    Messaged pt via [facility] to inform of instructions.

## 2021-03-16 NOTE — TELEPHONE ENCOUNTER
Abnormal values fax received from PAT:    INR value on 3/15/21 = 1.12 (N range is 0.88-1.11). Patient to undergo surgery on 3/29/21:  [address] TUMOR WITH NEURO NAVIGATION, NEURO MONITORING AND MICROSCOPE DISSECTION    Abnormal values fax endorsed to Dr. Jennifer Mcmillan for review. Per PAT, INR will be repeated on DOS STAT upon admission. Routed to providers as an [id number] [address] ENZO Mcclellan.

## 2021-03-17 NOTE — TELEPHONE ENCOUNTER
Received fax from [facility] for abnormal labs. Pt positive for MSSA. Nothing further needed.

## 2021-03-17 NOTE — TELEPHONE ENCOUNTER
Received PCP clearance fax     Per Mya Walton PA-C LOV note  3/16/21    \"proceed with surgery as planned\"     Fax placed on Dr. Lance mendoza for review    Nothing further needed

## 2021-03-22 NOTE — PROGRESS NOTES
Neurological Surgery   Neuro-oncology [facility]    Kirby Platt    Interval History: Images were reviewed in the comprehensive neuro-oncology clinic. He was seen in clinic today with Dr. Tosha Richards from radiation oncology.   He has some progressi

## 2021-03-23 NOTE — TELEPHONE ENCOUNTER
Please reschedule post op appointment to Dr. Miguel Alegre after neuro conference in the cancer center.  Once rescheduling confirmed, please email oncology with date/time so that they may add on for pathology review

## 2021-03-24 NOTE — TELEPHONE ENCOUNTER
From: Yvon Platt  To: Piedad Torres MD  Sent: 3/23/2021 1:23 PM CDT  Subject: Prescription Question    I need a refill of Pantoprazole 40 mg. Took the last one this morning, and don't have surgery until Monday.      Prescription can be called into C

## 2021-03-29 NOTE — ANESTHESIA PROCEDURE NOTES
Peripheral IV  Date/Time: 3/29/2021 8:00 AM  Inserted by: Flora Bray MD    Placement  Needle size: 16 G  Laterality: right  Location: hand  Local anesthetic: none  Site prep: alcohol  Technique: anatomical landmarks

## 2021-03-29 NOTE — ANESTHESIA PREPROCEDURE EVALUATION
PRE-OP EVALUATION    Patient Name: Christiano Zuñiga    Admit Diagnosis: Brain metastases ([address] [C79.31]    Pre-op Diagnosis: Brain metastases ([address] [C79.31]    SUBOCCIPITAL CRANIOTOMY FOR CEREBELLAR TUMOR WITH NEURO NAVIGATION, NEURO MONITORING AND R [address] reviewed. Anesthetic Complications           GI/Hepatic/Renal                (+) liver disease    (+) colon cancer             Cardiovascular    Negative cardiovascular ROS.     Exercise tolerance: good     MET: >4 HCT 35.9 (L) 03/04/2021    MCV 97.6 03/04/2021    MCH 31.8 03/04/2021    MCHC 32.6 03/04/2021    RDW 19.4 (H) 03/04/2021    .0 03/04/2021     Lab Results   Component Value Date     03/15/2021    K 4.3 03/15/2021     03/15/2021    CO2

## 2021-03-29 NOTE — ANESTHESIA POSTPROCEDURE EVALUATION
[address] Patient Status:  Inpatient   Age/Gender 50year old male MRN [id number]   [facility] 6NE-A Attending Nigel Chen MD   Hosp Day # 0 PCP Cesar Barahona MD       Anesthesia Post-op Note    SUBOCCIPITAL CR

## 2021-03-29 NOTE — ANESTHESIA PROCEDURE NOTES
Airway  Date/Time: 3/29/2021 7:48 AM  Urgency: elective      General Information and Staff    Patient location during procedure: OR  Anesthesiologist: Jermaine Nam MD  Performed: anesthesiologist     Indications and Patient Condition  Indications for

## 2021-03-29 NOTE — CONSULTS
[address]   NEUROCRITICAL CARE   CONSULT NOTE    Admission date: 3/29/2021  Reason for Consult: Post op tumor resection   Chief Complaint: Gait issues   ________________________________________________________________    History     Hi borderline   • Lipids Father    • Obesity Father    • Lipids Mother    • Obesity Mother    • Cancer Paternal Grandfather         ? PANCREATIC CANCER     Allergies No Known Allergies    Home Meds  Current Outpatient Medications   Medication Instructions   • cough, sputum, dyspnea, hemoptysis, pleuritic pain   GI: abdominal pain, nausea, vomiting, hematemesis, diarrhea, constipation, BRBPR, melena   : hematuria, dysuria, frequency, urgency  MSKL: pain, muscle weakness, joint pain, joint swelling, decreased R results for input(s): WBC, HGB, PLT in the last 168 hours. No results for input(s): TOTALPROTEIN, ALBUMIN, ALT, AST in the last 168 hours. Invalid input(s): Alivia James  No results for input(s): MG, PHOS in the last 168 hours.     Radio CNICU    A total of 38 minutes of critical care time (exclusive of billable procedures) was administered to manage and/or prevent neurologic instability.  This involved direct patient intervention, complex decision making, and/or extensive discussions with

## 2021-03-29 NOTE — CONSULTS
[facility]  CONSULT     Jolie Holter Zielke Patient Status:  Inpatient    1965 MRN [id number]   [facility] 6NE-A Attending Eli Hutchinson MD   Hosp Day # 0 PCP Narciso Michele MD     Reason for consult: medical management    Re Allergies: No Known Allergies    Medications:  No current facility-administered medications on file prior to encounter.   Acetaminophen (ACETAMINOPHEN EXTRA STRENGTH) 500 MG Oral Cap, Take 1,000 mg by mouth once.  , Disp: , Rfl:   dexamethasone (DECADRO input(s): GLU, BUN, CREATSERUM, GFRAA, GFRNAA, CA, ALB, NA, K, CL, CO2, ALKPHO, AST, ALT, BILT, TP in the last 168 hours.     Recent Labs   Lab 03/29/21  0639   PTP 14.1   INR 1.06       COVID-19 Lab Results    COVID-19  Lab Results   Component Value Date

## 2021-03-29 NOTE — H&P
History & Physical Examination    Patient Name: Lio Bar  MRN: [id number]  CSN: [id number]  YOB: 1965    Diagnosis: Brain Metastases     Present Illness: Very pleasant 49 y/o male presents today for surgical intervention with Dr. Fredo Stinson. (ACETAMINOPHEN EXTRA STRENGTH) 500 MG Oral Cap, Take 1,000 mg by mouth once.  , Disp: , Rfl: , 3/29/2021 at 0310  Pantoprazole Sodium 40 MG Oral Tab EC, Take 1 tablet (40 mg total) by mouth every morning before breakfast., Disp: 30 tablet, Rfl: 0, 3/28/202 Problem Relation Age of Onset   • Diabetes Father         borderline   • Lipids Father    • Obesity Father    • Lipids Mother    • Obesity Mother    • Cancer Paternal Grandfather         ? PANCREATIC CANCER     Social History    Tobacco Use      Smoking s

## 2021-03-29 NOTE — ANESTHESIA PROCEDURE NOTES
Arterial Line  Performed by: Urszula Kaufman MD  Authorized by: Urszula Kaufman MD     General Information and Staff    Procedure Start:  3/29/2021 7:48 AM  Procedure End:  3/29/2021 7:51 AM  Anesthesiologist:  Urszula Kaufman MD  Performed By:

## 2021-03-30 NOTE — PROGRESS NOTES
INPATIENT PROGRESS NOTE    Assessment:   Paige Mcfarlane in room 8284/7828-C, is a 50year old male, Hospital Day ( LOS: 1 day ) hospitalized for <principal problem not specified>.     Post-Op Day 1 Day Post-Op s/p Procedure(s):  SUBOCCIPITAL CRANIOTOMY FO dysmetria left. He has good strength of bilateral upper extremities. Good strength bilateral lower extremities proximally. Left dorsiflexion 2/5. Right dorsiflexion 3/5. Imaging Reviewed: MRI brain planned for today      [facility] BLOSSOM Jett M.S.,

## 2021-03-30 NOTE — OPERATIVE REPORT
[facility]    OPERATIVE REPORT    Patient:  Giulia Kidd;  YOB: 1965     CSN:  [id number]; Medical Record Number:  [id number]    Admission Date:  3/29/2021 Operation Date:  3/29/2021    . ..........................     Operating Physici three-point pin fixation on the operative table and carefully secured. A small amount of hair was clipped from the suboccipital region across the midline almost to both mastoid regions. This area was prepped and draped in a sterile fashion.     Midline sk resection.   The arachnoid over the midline cerebellar tonsils and were opened as a separate layer at the foramen magnum and clipped to the edges of the dura for inclusion in the eventual dural closure to ensure unobstructed flow of CSF out the fourth ventr fourth ventricle. Some of this tissue was spared but some of it was intimately involved with the tumor capsule and coagulated and removed en bloc with the tumor.   The entire tumor was eventually delivered in 1 piece to try and minimize a potential CSF spr intact. Issa Anderson.  Genaro Singh, [address] [facility]  Neurological Surgery    Co-Director  [place] [id number] Tammy Gruber

## 2021-03-30 NOTE — PLAN OF CARE
Assumed care of patient at 0730. Alert and oriented x4. Neuro checks changed to q2h. States has some decreased sensation to left leg, present prior to admission. Vasyl called for bilateral AFO.  At bedside, states will take a few days to come in because pt

## 2021-03-30 NOTE — PHYSICAL THERAPY NOTE
PHYSICAL THERAPY EVALUATION - INPATIENT     Room Number: 0854/6113-C  Evaluation Date: 3/30/2021  Type of Evaluation: Initial  Physician Order: PT Eval and Treat    Presenting Problem: Suboccipital craniotomy for cerebellar tumor with miscroscopic dissec Yes  Patient Owned Equipment: Cane  Patient Regularly Uses: Glasses    Prior Level of [place]: The pt reports he typically ambulates with a cane. Pt states his last fall was last Thursday when he slipped on the wood floor.   Pt states prior to that he bed?: A Little   How much help from another person does the patient currently need. ..   -   Moving to and from a bed to a chair (including a wheelchair)?: A Lot   -   Need to walk in hospital room?: A Lot   -   Climbing 3-5 steps with a railing?: Total with the following impairments: 1) impaired aerobic capacity, 2)impaired static and dynamic standing balance, and 3) impaired BLE force generating capacity.   These impairments and comorbidities manifest themselves as functional limitations in independent b within first 5 minutes of session and wore mask throughout remainder of session.

## 2021-03-30 NOTE — CM/SW NOTE
CM spoke with patient and he gave permission to give update on his status to his [facility]  Oc [place] [phone]. Patient was not able to tolerate PT this morning due to SOB/balance issues so CM will continue to follow for next site of care.

## 2021-03-30 NOTE — SLP NOTE
SPEECH/LANGUAGE/COGNITIVE EVALUATION - INPATIENT    Admission Date: 3/29/2021  Evaluation Date: 03/30/21    Reason for Referral: Other (Comment) (s/p resection of posterior fossa tumor)    ASSESSMENT & PLAN   ASSESSMENT & IMPRESSION  Patient is a 50 year-o concentration;Memory    Discharge Recommendations/Plan: Undetermined    Patient Experiencing Pain: Yes  Pain Ratin   Pain Location:  (head)     Nursing Aware of Pain?: Yes              GOALS  Goal #1 Patient will participate in a cognitive communicatio

## 2021-03-30 NOTE — PLAN OF CARE
Assumed care of the pt at approx. 1930 pm. Pt alert & oriented x4, following commands. Neuro checks q 1 hr, intact except for some L leg weakness and decreased sensation in that leg that per pt report is his baseline & not a new finding.  C/o mild incisiona

## 2021-03-30 NOTE — PROGRESS NOTES
[facility]  Progress Note    Horace Platt Patient Status:  Inpatient    1965 MRN [id number]   [facility] 6NE-A Attending Arun Ruggiero MD   Hosp Day # 1 PCP Malgorzata Ramos MD     CC: Medical management    SUBJECTIVE:  Compl 03/30/2021     03/30/2021    CA 8.2 03/30/2021    ALB 2.0 03/30/2021    ALKPHO 386 03/30/2021    BILT 1.4 03/30/2021    TP 6.2 03/30/2021     03/30/2021     03/30/2021           Meds:   Labetalol HCl (TRANDATE) injection 10 mg, 10 mg, foot drop  1. Neurology following  5. Elevated blood pressure on admission without history of hypertension  1. Patient off Cardene drip this morning as reported by RN. 6. Elevated liver function test  1.  Follow CMP in a.m.  7. Leukocytosis, possibly due t

## 2021-03-30 NOTE — SLP NOTE
ADULT SWALLOWING EVALUATION    ASSESSMENT    ASSESSMENT/OVERALL IMPRESSION:  Patient is a 50year-old male who was admitted 3/29/2021 s/p resection of posterior fossa tumor.  PMHx includes but not limited to colon cancer mets to liver, lungs, and brain, bra HTN      Past Medical History  Past Medical History:   Diagnosis Date   • Acute deep vein thrombosis (DVT) of popliteal vein of left lower extremity ([facility] 12/31/2018   • Cancer ([facility] 10/12/2018    Rectal Cancer   • Neuropathy     slight to lisa hands and fe Jourdan Avila,  Clinician  Prior to entering room, SLP donned appropriate PPE for Patient level of isolation including gloves, eyewear, and surgical mask. Patient was not masked.

## 2021-03-30 NOTE — PROGRESS NOTES
[address]   NEUROCRITICAL CARE   PROGRESS NOTE    Admission date: 3/29/2021  Reason for Consult: Post op tumor resection   Chief Complaint: Gait issues  ________________________________________________________________    SUBJECTIVE *   *     No results for input(s): MG, PHOS in the last 168 hours.     Scheduled Meds:  • Pantoprazole Sodium  40 mg Oral QAM AC   • dexamethasone Sodium Phosphate  2 mg Intravenous Q12H   • docusate sodium  100 mg Oral BID     Continuous In brain radiotherapy and 3 cycles of chemo       - Follows with Dr Beth Del Cid as an outpatient     L LE DVT       - Holding home [facility], had IVC filter in place       - Resume AC when ok with nsg, likely POD 5     Endocrine: - Accuchecks q6 with SSI prn      Suppres

## 2021-03-30 NOTE — OCCUPATIONAL THERAPY NOTE
OCCUPATIONAL THERAPY EVALUATION - INPATIENT     Room Number: 2005/1946-T  Evaluation Date: 3/30/2021  Type of Evaluation: Initial  Presenting Problem: cerebellar tumor resection 3/29    Physician Order: IP Consult to Occupational Therapy  Reason for Thersia Verónica With: Spouse               Occupation/Status: on disability     Drives: Yes  Patient Regularly Uses: Glasses    Prior Level of Function: Pt reports independence in bathing, dressing, toileting, and driving.  Pt sits during dressing and uses wall for support appeared distracted. Noted pt was tremorous while OT providing max A to don socks in bed and UE MMT.     RANGE OF MOTION AND STRENGTH ASSESSMENT  Upper extremity ROM is within functional limits     Upper extremity strength is within functional limits     CO on Gayatri Dyspnea Scale at EOB. RR 30-40. HR up to 135. O2 94% on room air. A line reading 160-170. Pt distracted by neurologist outside room, asking if anyone else was coming in. Min cues provided to redirect.  Pt unable to achieve standing on first attempt performance deficits impacting engagement in ADL/IADL MODERATE  3 - 5 performance deficits   Client Assessment/Performance Deficits MODERATE - Comorbidities and min to mod modifications of tasks    Clinical Decision Making MODERATE - Analysis of occupation

## 2021-03-31 NOTE — PROGRESS NOTES
RN reporting 4th dose of prn anti-hypertensive since 7pm.  As per discussion with Neuro [facility], Hydralazine 25mg PO q8h ordered to help with BP control. First dose now.     JOHN Hampton  Critical Care NP  [facility]  Spect: [phone]  Pgr: [phone]

## 2021-03-31 NOTE — OCCUPATIONAL THERAPY NOTE
OCCUPATIONAL THERAPY TREATMENT NOTE - INPATIENT     Room Number: 4343/2406-D  Session: 1   Number of Visits to Meet Established Goals: 5    Presenting Problem: cerebellar tumor resection 3/29    History related to current admission: Pt was admitted 3/29 fo Putting on and taking off regular lower body clothing?: A Little  -   Bathing (including washing, rinsing, drying)?: A Little  -   Toileting, which includes using toilet, bedpan or urinal? : A Little  -   Putting on and taking off regular upper body clot Pt would benefit from continued skilled OT to address problem solving, safety judgment, motor planning and sequencing during ADLs.  Continue to recommend discharge home with intermittent supervision for showering, toileting, functional mobility, LB dressing

## 2021-03-31 NOTE — PLAN OF CARE
Assumed care of the pt at approx. 1930 pm. Neuro checks q 2 hrs, intact except for L leg decreased sensation that he had prior to admission. Denies any pain. Denies SOB, no respiratory distress noted. Remained on 2 L NC overnight with sats in mid 90s.  In S

## 2021-03-31 NOTE — PROGRESS NOTES
[facility]  Progress Note    Nidhi Pereirabassam Platt Patient Status:  Inpatient    1965 MRN [id number]   [facility] 6NE-A Attending Venkatesh Church MD   Hosp Day # 2 PCP Mamie Doan MD     CC: Medical management    SUBJECTIVE:  Zeinae (APRESOLINE) tab 25 mg, 25 mg, Oral, Q8H LOIDA  PEG 3350 (MIRALAX) powder packet 17 g, 17 g, Oral, Daily PRN  Heparin Sodium (Porcine) 5000 UNIT/ML injection 5,000 Units, 5,000 Units, Subcutaneous, 2 times per day  hydrALAzine HCl (APRESOLINE) injection 10 m result from IVC. Does also have some anasarca, will give addition lasix this am.  6. Elevated liver function test  1. Improving, follow bilirubin  7. Leukocytosis, possibly due to steroid-induced leukocytosis  1. Follow CBC  8. Chronic anemia  1.  Follow C

## 2021-03-31 NOTE — PHYSICAL THERAPY NOTE
PHYSICAL THERAPY TREATMENT NOTE - INPATIENT    Room Number: 6389/8094-R     Session: 1   Number of Visits to Meet Established Goals: 3    Presenting Problem: Suboccipital craniotomy for cerebellar tumor with miscroscopic dissection    History related to c XI ROBOT-ASSISTED LAPAROSCOPIC LOW ANTERIOR COLON RESECTION N/A 10/12/2018    Performed by Carol Harvey MD at [address]  \"I don't think I'll fall on you today. I feel more balanced\"    Patient’s self-stated goal is to walk.     OBJECT completed gait 125'x1 w/ rw and min a of 1. Pt w/ unusual gait pattern due to weakness in le's. Pt w/ pronated b feet L>R, mild hyperextension b knees, no dorsiflexion - so steppage gait bilaterally.     THERAPEUTIC EXERCISES  Lower Extremity Alternating

## 2021-03-31 NOTE — PLAN OF CARE
Assumed patient care this morning around 0730 am. Patient is alert and oriented, neuro assessments are intact, patient with left side numbness and neuropathy per baseline. Patient is on room air, reaching 1500 on IS.  Patient hypertensive throughout the day neurological status  - Initiate measures to prevent increased intracranial pressure  - Maintain blood pressure and fluid volume within ordered parameters to optimize cerebral perfusion and minimize risk of hemorrhage  - Monitor temperature, glucose, and so

## 2021-03-31 NOTE — PROGRESS NOTES
[facility]  Neurosurgery Progress Note    Mecca Yo Patient Status:  Inpatient    1965 MRN [id number]   [facility] 6NE-A Attending Colton Davidson MD   Hosp Day # 2 PCP Mitul Ortega MD     Subjective:  Pt doing well this diffusion restriction are evident in the cerebellum at the left superior lateral and right inferior lateral aspects.  These could represent adjacent areas of developing necrosis or ischemia. Earla Heather has been interval resection    of a mass from the midline BID  Continue pain control as needed  SCDs/TEDs for DVT prophylaxis- ok to begin SubQ heparin  MRI reviewed by Dr. Charis Mclaughlin following  Bilateral AFO braces ordered  OK to transfer out of unit      [address] 27  3/

## 2021-03-31 NOTE — PROGRESS NOTES
[address]   NEUROCRITICAL CARE   PROGRESS NOTE    Admission date: 3/29/2021  Reason for Consult: Post op tumor resection   Chief Complaint: Gait issues  ________________________________________________________________    SUBJECTIVE Labs   Lab 03/30/21  0424 03/31/21  0432   WBC 14.0* 13.2*   HGB 9.4* 9.6*   .0 147.0*     Recent Labs   Lab 03/30/21  0424 03/30/21  1826 03/31/21 0432   * 174* 146*   * 524* 345*     No results for input(s): MG, PHOS in the last 168 for metastatic disease given hx of rectal cancer          -- Pain controled with current regimen         - Repeat MRI brain showing residual enhancement at the edge of resection cavity and some higher cortical SAH          - Continue decadron per nsg, curr Checklist         - Lines: PIVs, port         - DVT Prophylaxis: SCDs, HSQ today until able to resume home AC         - Diet: Gen diet            - IVF: Dc'ed         - Electrolytes: Replete per protocol         - Code Status: FULL     Dispo: TY - t

## 2021-04-01 NOTE — PROGRESS NOTES
[facility]  Progress Note    Yvon Platt Patient Status:  Inpatient    1965 MRN [id number]   [facility] 6NE-A Attending Blas Benito MD   Hosp Day # 3 PCP Skyler Omer MD     CC: Medical management    SUBJECTIVE:  Zeinae injection 5,000 Units, 5,000 Units, Subcutaneous, 2 times per day  hydrALAzine HCl (APRESOLINE) injection 10 mg, 10 mg, Intravenous, Q4H PRN  Labetalol HCl (TRANDATE) injection 10 mg, 10 mg, Intravenous, Q4H PRN  hydrALAzine HCl (APRESOLINE) tab 50 mg, 50 additional lasix. 6. Elevated liver function test  1. Improving, follow bilirubin  7. Leukocytosis, possibly due to steroid-induced leukocytosis  1. Follow CBC  8. Chronic anemia  1.  Follow CBC    Quality:  · DVT Prophylaxis: SCD  · CODE status: Full  · F

## 2021-04-01 NOTE — PLAN OF CARE
Assumed care at 0700. Pt A/O x4. RA. Neuros Q4, see flow sheets. VSS. Dressing changed on Incision site at the base of the neck. SBP maintained within ordered parameters. Plan for possible discharge tomorrow with [facility] PT. Pt and pt wife updated on POC.  Call

## 2021-04-01 NOTE — SLP NOTE
SPEECH DAILY NOTE - INPATIENT    ASSESSMENT & PLAN   ASSESSMENT  Patient seen for follow up at room-side. Spouse present and expressed concerns regarding discharge plan of care.  Also reported Pt with improved swallowing however concerns regarding need for 04/05/21  Number of Visits to Meet Established Goals: 1    Session: 1; recommend 1-2 more for pt/family education/training in strategies    If you have any questions, please contact Aide Amin [facility], pager [phone]

## 2021-04-01 NOTE — TELEPHONE ENCOUNTER
Noted that patient underwent surgery on 3/29/21 with Dr. Karla Camejo. Patient to be DC'd to home possibly tomorrow, 4/2/21 per B. Merline Lapping, [place] 's in-hospital progress note. Reminder placed in nursing tab to conduct patient outreach.

## 2021-04-01 NOTE — PLAN OF CARE
Received pt from [facility]. Vss. Alert. No c/o. dsg to head/neck d/i. Neuro's appear to be unchanged. Will cont to monitor. Seizure prec in place.

## 2021-04-01 NOTE — PHYSICAL THERAPY NOTE
PHYSICAL THERAPY TREATMENT NOTE - INPATIENT    Room Number: 4407/1511-P     Session: 2   Number of Visits to Meet Established Goals: 3    Presenting Problem: Suboccipital craniotomy for cerebellar tumor with miscroscopic dissection    History related to c XI ROBOT-ASSISTED LAPAROSCOPIC LOW ANTERIOR COLON RESECTION N/A 10/12/2018    Performed by Terrell Hernandez MD at [facility]  \" My feet are really swollen. \" Educated on elevating lisa le on pillows and ankle pumps.      Patient’s self-stated edema on lisa le's, Cues provided for proper pace management, pt tends to ambulate fast but dec balance noted with fast pace, with cues pt maintained balance with cga. See gait pattern above.    THERAPEUTIC EXERCISES  Lower Extremity Alternating marching

## 2021-04-01 NOTE — PAYOR COMM NOTE
--------------  CONTINUED STAY REVIEW    Payor: [employer]  Subscriber #:  [id number]  Authorization Number: [id number]    Admit date: 3/29/21  Admit time:  5:42 AM    Admitting Physician: Gus Torres MD  Attending Physician:  Gus Torres MD  P g/dL 4.4    A/G Ratio   1.0 - 2.0 0.4Low              Subjective:   Doing well today. Dizziness has resolved. Some unsteadiness coming to stand and with walking. Walked the unit with nursing assistance.        Incision: healing well, no significant drainage leukocytosis  1. Follow CBC  8. Chronic anemia  1.  Follow CBC             MEDICATIONS ADMINISTERED IN LAST 1 DAY:  dexamethasone Sodium Phosphate (DECADRON) 4 MG/ML injection 2 mg     Date Action Dose Route User    4/1/2021 0803 Given 2 mg Intravenous Giac

## 2021-04-01 NOTE — PROGRESS NOTES
INPATIENT PROGRESS NOTE    Assessment:   Yulisa Meyer in room 7600/7600-A, is a 50year old male, Hospital Day ( LOS: 3 days ) hospitalized for <principal problem not specified>.     Post-Op Day 3 Days Post-Op s/p Procedure(s):  SUBOCCIPITAL CRANIOTOMY resistance. Imaging reviewed: No recent imaging      Maria Antonia Sanchez M.S., PA-C  [facility]  [address] [facility], [address], [address]  [phone]  4/1/2021 12:00 PM

## 2021-04-02 NOTE — PLAN OF CARE
Assumed care @ 1900. On telemetry monitoring. Dressing dry and intact  Updated patient with plan of care  Ensures patient safety. Maintained a calm and safe environment. Side rails up x2. Needs attended.   Call light within reach, will continue to mon SOB or any respiratory difficulty  - Respiratory Therapy support as indicated  - Manage/alleviate anxiety  - Monitor for signs/symptoms of CO2 retention  Outcome: Progressing     Problem: NEUROLOGICAL - ADULT  Goal: Achieves stable or improved neurological

## 2021-04-02 NOTE — PHYSICAL THERAPY NOTE
PHYSICAL THERAPY TREATMENT NOTE - INPATIENT    Room Number: 9695/6219-U     Session: 3   Number of Visits to Meet Established Goals:  (add 3 sessions)    Presenting Problem: Suboccipital craniotomy for cerebellar tumor with miscroscopic dissection    Hist REMOVED     • XI ROBOT-ASSISTED LAPAROSCOPIC LOW ANTERIOR COLON RESECTION N/A 10/12/2018    Performed by Alexa Johnson MD at [address]  \"I think this is the best option\" - in response to acute rehab recommendation     Patient’s self-st STATUS  Gait Assessment   Gait Assistance: Minimum assistance  Distance (ft): 20, 30, 10   Assistive Device: Rolling walker  Pattern: R Steppage;L Steppage; Ataxic (increased unsteadiness with turns and backing up)  Stoop/Curb Assistance: Not tested  Commen sitting to standing = 2  - standing unsupported with eyes closed = 2  - reaching forward with outstretched arm while standing = 2  -  object from floor from standing = 2  - turning to look behind R and L while standing = 2  - standing unsupported on donned throughout session.  Pt in mask.

## 2021-04-02 NOTE — SLP NOTE
SPEECH DAILY NOTE - INPATIENT    ASSESSMENT & PLAN   ASSESSMENT  Patient seen for cognitive therapy and dysphagia follow-up:    Cognition: Trained/educated in compensatory memory strategies and attention strategies in which patient verbalized understanding 04/03/21  Number of Visits to Meet Established Goals: 1      If you have any questions, please contact Brandie Carnes

## 2021-04-02 NOTE — CONSULTS
[facility]    Sara Platt Patient Status:  Inpatient    1965 MRN [id number]   [facility] 7NE-A Attending Johan Wade MD   [facility] Day # 4 PCP Shira Ramirez MD     Patient Identification  Christian Marks is a 50year old Date   • COLECTOMY  10/12/2018   • COLONOSCOPY  08/09/2018   • ILEOSTOMY TAKE DOWN N/A 4/24/2019    Performed by Ange Bentley MD at [facility] MAIN OR   • Yoselin Naqvi N/A 3/29/2021    Performed by Tarun Zepeda MD at [facility] MAIN OR   • [address] [place] [address] ceFAZolin sodium (ANCEF/KEFZOL) 2 GM/20ML premix IV syringe 2 g, 2 g, Intravenous, Once  HYDROcodone-acetaminophen (NORCO) 5-325 MG per tab 1 tablet, 1 tablet, Oral, PRN   Or  HYDROcodone-acetaminophen (NORCO) 5-325 MG per tab 2 tablet, 2 tablet, Oral, PRN Tobacco Use      Smoking status: Never Smoker      Smokeless tobacco: Never Used    Alcohol use:  Yes      Alcohol/week: 2.0 standard drinks      Types: 2 Cans of beer per week      Comment: 1 beer 2x week      Family History   Problem Relation Age of Onse enlarged. Bowel sounds present. Musculoskeletal:     Right Upper Extremity:  Strength is 4-5. ROM WNL. Left Upper Extremity:  Strength is 4-5. ROM WNL.    Right Lower Extremity:  Strength  is 4 at hip/quads and 1-2 ankle DF.   ROM WNL

## 2021-04-02 NOTE — PROGRESS NOTES
[facility]  Neurosurgery Progress Note    Elaine Thornton Patient Status:  Inpatient    1965 MRN [id number]   [facility] Prince Robbins MD   [facility] Day # 4 PCP Eulalia Palomares MD     Subjective:  Elaine Thornton i no significant hydrocephalus. 5. Stable subcentimeter L Parietal metastatic lesion    Assessment:    S/P Suboccipital craniotomy for tumor resection POD #4  Metastatic Rectal Cancer    Plan:    Seen with Dr. Rosetta Cranker. 1. Pain control  2.  DVT prophylaxis

## 2021-04-02 NOTE — PLAN OF CARE
Assumed care @0730  VSS,   A&Ox4, neuro's q4 with mild slurred speech, bilateral foot drop, chronic LE neuropathy  RA    NSR, increased HR with activity. Denies Pain,   Up with 1 assist and walker as tolerated. PT/OT recommending acute.   Emil Beavers has a Incentive Spirometry  - Assess the need for suctioning and perform as needed  - Assess and instruct to report SOB or any respiratory difficulty  - Respiratory Therapy support as indicated  - Manage/alleviate anxiety  - Monitor for signs/symptoms of CO2 ret

## 2021-04-02 NOTE — PROGRESS NOTES
[facility]  Progress Note    Mitch Platt Patient Status:  Inpatient    1965 MRN [id number]   [facility] 6NE-A Attending Bridget Thurman MD   [facility] Day # 4 PCP Danie Noriega MD     CC: Medical management    SUBJECTIVE:  Patie Units, Subcutaneous, 2 times per day  hydrALAzine HCl (APRESOLINE) injection 10 mg, 10 mg, Intravenous, Q4H PRN  Labetalol HCl (TRANDATE) injection 10 mg, 10 mg, Intravenous, Q4H PRN  hydrALAzine HCl (APRESOLINE) tab 50 mg, 50 mg, Oral, Q8H LOIDA  lisinopril steroid-induced leukocytosis  1. Follow CBC  8. Chronic anemia  1.  Follow CBC    Quality:  · DVT Prophylaxis: SCD  · CODE status: Full  · Mccoy: No      Will the patient be referred to [facility] on discharge?:  Follow-up with regular outpatient primary care phys

## 2021-04-02 NOTE — CM/SW NOTE
Per RN, PT is now changing their recommendation to Acute Rehab. PMR eval placed.   SW to f/u with pt's dc plan pending PMR eval.

## 2021-04-02 NOTE — OCCUPATIONAL THERAPY NOTE
OCCUPATIONAL THERAPY TREATMENT NOTE - INPATIENT     Room Number: 1720/1082-J  Session: 2   Number of Visits to Meet Established Goals: 5    Presenting Problem: cerebellar tumor resection 3/29    History related to current admission: Pt was admitted 3/29 fo Seizure;Bed/chair alarm; Other (Comment) (SBP <150)    WEIGHT BEARING RESTRICTION                   PAIN ASSESSMENT  Ratin           ACTIVITY TOLERANCE                         O2 SATURATIONS                ACTIVITIES OF DAILY LIVING ASSESSMENT  AM-PAC ‘ and demo's impaired balance. Facilitated participation in standing at toilet for urination to support improved standing balance/endurance for functional tasks.  Requires MIN assist for balance when UEs removed from walker for toileting and grooming tasks training;Equipment eval/education; Fine motor coordination activities  Rehab Potential : Good  Frequency (Obs): 3-5x/week      OT Goals: (progressing 4/2)  ADL Goals   Patient will perform grooming: with supervision  Patient will perform upper body dressing

## 2021-04-03 NOTE — PROGRESS NOTES
Assumed care at [address]. Patient A&Ox4. Neuro intact. Denies pain. IV decadron as ordered.   Awaiting ins approval to [facility].

## 2021-04-03 NOTE — PLAN OF CARE
Assumed care @0730  VSS,   A&Ox4, neuro's q4 with mild slurred speech, bilateral foot drop, chronic LE neuropathy  RA    NSR, increased HR with activity. Denies Pain,   Up with 1 assist and walker as tolerated. PT/OT recommending acute.   Jose Sorenson has a hygiene including cough, deep breathe, Incentive Spirometry  - Assess the need for suctioning and perform as needed  - Assess and instruct to report SOB or any respiratory difficulty  - Respiratory Therapy support as indicated  - Manage/alleviate anxiety

## 2021-04-03 NOTE — PROGRESS NOTES
INPATIENT PROGRESS NOTE    Assessment:   Preston Vallecillo in room 7600/7600-A, is a 50year old male, Hospital Day ( LOS: 5 days ) hospitalized for cerebellar tumor resection   Post-Op Day 5 Days Post-Op s/p Procedure(s):  SUBOCCIPITAL CRANIOTOMY FOR CEREB significant change to bilateral dorsiflexion motor strength  Breathing easy and even. Skin warm and dry. Imaging Reviewed: No recent imaging      [facility] BLOSSOM Hwang M.S., MEÑO LINDO [facility]  [address] [facility], [address] [facility],

## 2021-04-03 NOTE — PHYSICAL THERAPY NOTE
PHYSICAL THERAPY TREATMENT NOTE - INPATIENT    Room Number: 3535/5319-N     Session: 1/3 additional sessions   Number of Visits to Meet Established Goals:  (add 3 sessions)    Presenting Problem: Suboccipital craniotomy for cerebellar tumor with miscrosco 0  Location: denied       BALANCE                                                                                                                       Static Sitting: Good  Dynamic Sitting: Fair           Static Standing: Fair -  Dynamic Standing: Poor + contralateral thigh in order to don shoes. Pt able to complete multiple bouts of ambulation - required seated rest breaks d/t poor cardiopulmonary endurance.   HR elevated to 145bpm - required 1-2 minutes of seated rest break to recover to >110bpm. supervision      Goal #4 Pt will ascend/descend 1 flight of stairs with use of a cane and handrail with Min A.   Goal #5     Goal #6     Goal Comments: Goals established on 3/30/2021 - all goals ongoing as of 4/3/2021          Writer PPE: surgical mask, go

## 2021-04-03 NOTE — PROGRESS NOTES
[facility]  Progress Note    Horace Platt Patient Status:  Inpatient    1965 MRN [id number]   [facility] 6NE-A Attending Arun Ruggiero MD   [facility] Day # 5 PCP Malgorzata Ramos MD     CC: Medical management    SUBJECTIVE:  Zeinae mg, Oral, Daily  Pantoprazole Sodium (PROTONIX) EC tab 40 mg, 40 mg, Oral, QAM AC  dexamethasone Sodium Phosphate (DECADRON) 4 MG/ML injection 2 mg, 2 mg, Intravenous, Q12H  HYDROcodone-acetaminophen (NORCO) 5-325 MG per tab 1 tablet, 1 tablet, Oral, PRN dependent on: Insurance Approval  At this point Mr. Vianca Manuel  is expected to be discharge to: [facility]      Questions/concerns and Plan of care were discussed with patient. Discussed with RN.     Cintia Huertas MD  [facility]

## 2021-04-04 NOTE — PLAN OF CARE
Assumed patient care at 1930  RA.   Tele; NSR/ST; HR 140s with activity  Denies pain  Incision C/D/I  Complex neuro exam per protocol; no new neurological changes  BP within parameters  Still with BLE pitting edema  Ambulating in hallway with walker and 1 a

## 2021-04-04 NOTE — CM/SW NOTE
MSW called and spoke with [facility]. Insurance auth still pending for admission to [facility] [facility]. MSW attempted to call patient's room to discuss and also left a message for wife to confirm plan and choice of Acute rehab. SW will follow up tomorrow.     Ana Rico

## 2021-04-04 NOTE — PHYSICAL THERAPY NOTE
PHYSICAL THERAPY TREATMENT NOTE - INPATIENT    Room Number: 7159/0626-R     Session: 2/3 additional sessions   Number of Visits to Meet Established Goals:  (add 3 sessions)    Presenting Problem: Suboccipital craniotomy for cerebellar tumor with miscrosco • WISDOM TEETH REMOVED     • XI ROBOT-ASSISTED LAPAROSCOPIC LOW ANTERIOR COLON RESECTION N/A 10/12/2018    Performed by Velasquez Givens MD at [address]  \"I'm glad to have a chance to walk again this afternoon\"    Patient’s self-stated agreeable to therapy. Pt wife in room during session. Pt educated on goals and course of PT session. Pt denies pain and dizziness throughout session. Pt min assist to don B AFO, supervision to don shoes.   Pt sit to stand from [facility] min assist. Pt amb x100 CC: 60y old Male admitted with a chief complaint of Sepsis, with hx of necrotizing pancreatitis     HPI:  59 y/o male retransferred from [facility] with prolonged hospitalization since April 2020 for COVID-19 infection s/p cytokine storm, hypoxic respiratory failure s/p trach, s/p TPN for gastroparesis, required HD for MICHAEL, complicated by recent severe necrotizing pancreatitis with chylous ascites requiring endoscopic surgical intervention. He was transferred to rehab after pancreatic necrosectomy 10/15/20 in stable condition also with stage 4 decubitus ulcers well as DTI's to B/L heels. He was admitted from rehab to ICU on 10/23/20 for severe sepsis likely from GI source. Initially admitted for severe sepsis due to recurrent bacteremia due to E coli ESBL. Patient was started on meropenem 10 day course with a persistent leukocytosis and transaminitis. He has been on midodrine 10mg BID for hypotension. He was transferred to [facility] 11/3 for repeat ERCP out of concern for infected stent s/p ERCP stent removal, s/p meropenem, c/b enterococcus bacteremia on vanc until 11/26 and sent back to rehab 11/17 but then readmitted for fevers, leukocytosis and vomiting. He noted that his stomach hurt one time and then he threw up NBNB emesis. Fevers were reported to 102 from when he arrived.     Surgery consulted to evaluate for further evaluation to see if further intervention warranted. Patient had repeat EUS on 11/25 with continued drainage from pancreas demonstrating pus, AXIOS stent removed and replaced by GI with pigtail. Patient currently on regular diet, denies nausea vomiting at this time. Having GI fxn,, passing flatus and having BM. Blood cx on this admission growing enterococcus faecium.      PMHx: Diabetes    HLD (hyperlipidemia)    ADHD    No pertinent past medical history      PSHx: History of biliary stent insertion    Presence of pancreatic duct stent    No significant past surgical history      Medications (inpatient): collagenase Ointment 1 Application(s) Topical daily  collagenase Ointment 1 Application(s) Topical daily  enoxaparin Injectable 40 milliGRAM(s) SubCutaneous daily  fluconAZOLE   40 mG/mL Suspension 400 milliGRAM(s) Oral daily  levETIRAcetam 250 milliGRAM(s) Oral two times a day  linezolid  IVPB 600 milliGRAM(s) IV Intermittent every 12 hours  melatonin 6 milliGRAM(s) Oral at bedtime  meropenem  IVPB 1000 milliGRAM(s) IV Intermittent every 8 hours  multivitamin 1 Tablet(s) Oral daily  naphazoline/pheniramine Solution 1 Drop(s) Both EYES three times a day  pantoprazole    Tablet 40 milliGRAM(s) Oral before breakfast    Medications (PRN):acetaminophen   Tablet .. 650 milliGRAM(s) Oral every 6 hours PRN  benzocaine 15 mG/menthol 3.6 mG (Sugar-Free) Lozenge 1 Lozenge Oral three times a day PRN  guaiFENesin   Syrup  (Sugar-Free) 100 milliGRAM(s) Oral every 6 hours PRN  ondansetron Injectable 4 milliGRAM(s) IV Push every 6 hours PRN  oxyCODONE    IR 5 milliGRAM(s) Oral every 4 hours PRN  oxyCODONE    IR 10 milliGRAM(s) Oral every 4 hours PRN  polyethylene glycol 3350 17 Gram(s) Oral daily PRN    Allergies: No Known Allergies  (Intolerances: )  Social Hx:   Family Hx: No pertinent family history in first degree relatives        Physical Exam  T(C): 36.4  HR: 73 (73 - 95)  BP: 93/61 (93/61 - 105/72)  RR: 17 (17 - 18)  SpO2: 99% (98% - 99%)  Tmax: T(C): , Max: 36.7 (12-01-20 @ 21:08)    General: well developed, well nourished, NAD  Neuro: alert and oriented, no focal deficits, moves all extremities spontaneously  HEENT: NCAT, EOMI, anicteric, mucosa moist  Respiratory: airway patent, respirations unlabored  CVS: regular rate and rhythm  Abdomen: soft, NT mildly distended  Extremities: no edema, sensation and movement grossly intact  Skin: warm, dry, appropriate color    Labs:                        9.7    10.59 )-----------( 199      ( 02 Dec 2020 06:00 )             32.2       12-02    132<L>  |  93<L>  |  19  ----------------------------<  138<H>  4.3   |  28  |  0.49<L>    Ca    10.3      02 Dec 2020 06:00  Phos  3.6     12-02  Mg     2.1     12-02    TPro  7.4  /  Alb  3.2<L>  /  TBili  0.8  /  DBili  x   /  AST  66<H>  /  ALT  77<H>  /  AlkPhos  912<H>  12-02            Imaging and other studies:  < from: MR MRCP w/wo IV Cont (11.27.20 @ 19:15) >  PROCEDURE:  MRI of the abdomen was performed with and without intravenous contrast.  IV Contrast: Gadavist. 5 cc administered, 2.5 cc discarded.  MRCP was performed.    FINDINGS:  LOWER CHEST: Small left and trace right pleural effusions.    LIVER: Within normal limits.  BILE DUCTS: Pneumobilia. There is mild biliary dilatation.  GALLBLADDER: Air in the gallbladder. Sludge.  SPLEEN: Within normal limits.  PANCREAS: Artifact from Axios stent noted.  Chest pancreatitis is again noted. Areas of old off necrosis are mildly decreased in size. A 5.9 x 1.4 cm collection on series 16 image 72 previously 7 x 2.8 cm on 10/22/2020. A 4.4 x 2.6 hematocrit collection along the tail and tracking into the paracolic gutter previously 6.2 x 3.7 cm.  ADRENALS: Within normal limits.  KIDNEYS/URETERS: Small right cyst. Hemorrhagic left renal cyst.    VISUALIZED PORTIONS:  BOWEL: Within normal limits.  PERITONEUM: Mild ascites.  VESSELS: Within normal limits.  RETROPERITONEUM/LYMPH NODES: No lymphadenopathy.  ABDOMINAL WALL: Within normal limits.  BONES: Within normal limits.    IMPRESSION:  Necrotizing pancreatitis again noted with mildly decreased walled off necrosis since 10/22/2020.         CURRENT GOALS   Goal #1 Patient is able to demonstrate supine - sit EOB @ level: modified independent      Goal #2 Patient is able to demonstrate transfers Sit to/from Stand at assistance level: supervision      Goal #3 Patient is able to ambulate 150 fe

## 2021-04-04 NOTE — PLAN OF CARE
Assumed patient care at 0730.  VSS   NSR/ ST with exertion   No pain reported   Incision to posterior head C/D/I   Ambulating halls with 1 assist   Awaiting for insurance auth to [facility]   POC discussed, will continue to monitor       Problem: CARDIOVASCULAR - A for signs/symptoms of CO2 retention  Outcome: Progressing     Problem: NEUROLOGICAL - ADULT  Goal: Achieves stable or improved neurological status  Description: INTERVENTIONS  - Assess for and report changes in neurological status  - Initiate measures to p

## 2021-04-04 NOTE — PROGRESS NOTES
INPATIENT PROGRESS NOTE    Assessment:   Jesus Simmons in room 7600/7600-A, is a 50year old male, Hospital Day ( LOS: 6 days ) hospitalized for <principal problem not specified>.     Post-Op Day 6 Days Post-Op s/p Procedure(s):  SUBOCCIPITAL CRANIOTOMY significant erythema   DVT Evaluation:  SCDs on     Clinical exam:  Patient is awake, alert and orientated. Laying comfortably in bed. Speech is fluent, following commands easily. Comprehension intact. Face appears metric. Finger-to-nose is intact.   Christa Sharif

## 2021-04-05 NOTE — DIETARY NOTE
[address] [facility] YASMANI Platt     Admitting diagnosis:  Brain metastases ([facility] [address] [C79.31]  Secondary cancer of brain ([facility] [address]    Ht: 195.6 cm (6' 5\")  Wt: 122 kg (268 lb 15.4 oz). Body mass index is 31.89 kg/m².   IBW: 94.6 kg    Wt

## 2021-04-05 NOTE — PLAN OF CARE
Assumed care @ 1930. Pt a/o x4, neuro checks q4, no neuro changes. Bilat feet foot drop unchanged. Toes unable to point upward. VSS. Tele SR. No acute respiratory distress noted. Denies any pain.     Pt ambulated with walker and 1 person assist. SOB or any respiratory difficulty  - Respiratory Therapy support as indicated  - Manage/alleviate anxiety  - Monitor for signs/symptoms of CO2 retention  Outcome: Progressing     Problem: NEUROLOGICAL - ADULT  Goal: Achieves stable or improved neurological

## 2021-04-05 NOTE — PAYOR COMM NOTE
--------------  CONTINUED STAY REVIEW    Payor: PUJA [facility]  Subscriber #:  [id number]  Authorization Number: [id number]    Admit date: 3/29/21  Admit time:  5:42 AM    Admitting Physician: Jani Winkler MD  Attending Physician:  MD TYLER Bansal management     1. Nausea  1. Resolved  2. GERD  1. PPI  3. History of DVT  1. Past previous IVC filter  4. Peripheral neuropathy, foot drop  1. Neurology following  5. Elevated blood pressure on admission without history of hypertension  1.  Stableon hydral

## 2021-04-05 NOTE — CM/SW NOTE
MJ does not have insurance auth as of now. DONNA will continue to communicate with Laurel, liaison from [facility].     Nadine Wasserman LCSW

## 2021-04-05 NOTE — CM/SW NOTE
CHEO has received auth for admission today. EZEKIEL arranged [facility] for 5:30 . PCS form completed.       CHEO RN report: [phone]  THE [facility]: [address] VERENA Mcclellan

## 2021-04-05 NOTE — PROGRESS NOTES
[facility]  Progress Note    Valentine Platt Patient Status:  Inpatient    1965 MRN [id number]   [facility] 6NE-A Attending Oliver Courtney MD   [facility] Day # 7 PCP Yeison Chauhan MD     CC: Medical management    SUBJECTIVE:  Zeinae mg, Oral, Q8H [address]  lisinopril tab 10 mg, 10 mg, Oral, Daily  Pantoprazole Sodium (PROTONIX) EC tab 40 mg, 40 mg, Oral, QAM AC  HYDROcodone-acetaminophen (NORCO) 5-325 MG per tab 1 tablet, 1 tablet, Oral, PRN   Or  HYDROcodone-acetaminophen (NORCO) 5-325 MG AR      Questions/concerns and Plan of care were discussed with patient. Discussed with RN.     Irving Wong MD  [facility]

## 2021-04-05 NOTE — PROGRESS NOTES
Report called to receiving YVONNE Cooper at [facility], all questions answered, verbalized understanding. Plan for patient to discharge when wife arrives.

## 2021-04-05 NOTE — PROGRESS NOTES
NURSING DISCHARGE NOTE    Discharge AVS completed. Patient medically cleared to discharge per all consults. Discharge orders and instructions given to take to [facility] via patient's wife Alvin Bernheim.   Report call to receiving YVONNE Cooper at [facility], [facility] que

## 2021-04-05 NOTE — PROGRESS NOTES
[facility]  Neurosurgery Progress Note    Matt Allen Patient Status:  Inpatient    1965 MRN [id number]   [facility] 7NE-A Attending Chalino Herrera MD   [facility] Day # 7 PCP Kari William MD     Chief Complaint:  Vandana Shah

## 2021-04-05 NOTE — CM/SW NOTE
MSW spoke with RN. Patient's wife will transport to  this evening.     Monique Cross, [facility] Hospital[facility]

## 2021-04-05 NOTE — PLAN OF CARE
Assumed care of patient at 07:30 am  A/O x 4, VSS, SPO2 maintained on RA. Denies any pain. Neuro assessments q 4 hr, no new neuro deficits noted  Rt and Lt foot drop unchanged, AFO boots utilized. Tolerated ambulating x 1 with walker .   Plan to dischar Respiratory Therapy support as indicated  - Manage/alleviate anxiety  - Monitor for signs/symptoms of CO2 retention  Outcome: Adequate for Discharge     Problem: NEUROLOGICAL - ADULT  Goal: Achieves stable or improved neurological status  Description: INTE

## 2021-04-05 NOTE — PHYSICAL THERAPY NOTE
PHYSICAL THERAPY TREATMENT NOTE - INPATIENT    Room Number: 9763/1015-F     Session: 8/10  Number of Visits to Meet Established Goals: 10 (2 sessions added.)    Presenting Problem: Suboccipital craniotomy for cerebellar tumor with miscroscopic dissection TEETH REMOVED     • XI ROBOT-ASSISTED LAPAROSCOPIC LOW ANTERIOR COLON RESECTION N/A 10/12/2018    Performed by Doug Bella MD at [address]  \"I can't believe how quickly I get tired. \"    Patient’s self-stated goal is to be able to ret donned with total assist.   Supine to sit with supervision. Sit to stand with min assist of one from bed. Gait training with RW and min assist, lisa AFO on, cues to maintain proper pace to improve balance during gait.  Pt shows ataxic gait pattern, weak

## 2021-04-05 NOTE — PAYOR COMM NOTE
--------------  CONTINUED STAY REVIEW    Payor: PUJA [employer]  Subscriber #:  [id number]  Authorization Number: [id number]    Admit date: 3/29/21  Admit time:  5:42 AM    Admitting Physician: Arun Ruggiero MD  Attending Physician:  Arun Ruggiero MD  P 150.0 - 450.0 10(3)uL 168.0    MCV   80.0 - 100.0 fL 102. 6High     MCH   26.0 - 34.0 pg 31.7    MCHC   31.0 - 37.0 g/dL 30.9Low     RDW   11.0 - 15.0 % 19.9High     Comment: No morphology performed. No clinically significant change in results.    RDW-SD Date Action Dose Route User    4/5/2021 0820 Given 2 mg Oral Griselda Hageman, RN      Heparin Sodium (Porcine) 5000 UNIT/ML injection 5,000 Units     Date Action Dose Route User    4/5/2021 0819 Given 5,000 Units Subcutaneous (Right Lower Abdomen) Leta Camejo

## 2021-04-06 NOTE — PAYOR COMM NOTE
--------------  DISCHARGE REVIEW    Payor: PUJA [facility]  Subscriber #:  [id number]  Authorization Number: [id number]    Admit date: 3/29/21  Admit time:   5:42 AM  Discharge Date: 4/5/2021  6:11 PM     Admitting Physician: Venkatesh Church MD  Attending y

## 2021-04-06 NOTE — TELEPHONE ENCOUNTER
Received call from Diane SARAVIA and spoke to Deni Lawton who wanted to clarify medication in regard to Xarelto. Per James Rodriguez  APN discharge summary note 4/6/21    \"Rivaroxaban (XARELTO) 20 MG Oral Tab  Take 1 tablet (20 mg total) by mouth daily with food. , N

## 2021-04-06 NOTE — DISCHARGE SUMMARY
[facility]  Discharge Summary    Eamon Platt Patient Status:  Inpatient    1965 MRN [id number]   [facility] 7NE-A Attending No att. providers found   Hosp Day # 7 PCP Derick Banuelos MD     Date of Admission: 3/29/2021    Date surgery.  Main complaint continues to be balance issues.      Last Hx: 2/9/21  HISTORY OF PRESENT ILLNESS:Rehan Nieves a 50year old male who presents today for brain metastases, imaging review, neuro conference follow up     Saw Dr. Diony canada [facility] Medication List as of 4/5/2021  4:38 PM    START taking these medications    HYDROcodone-acetaminophen  MG Oral Tab  Take 1-2 tablets by mouth every 4 (four) hours as needed for Pain., Print Script, Disp-20 tablet, R-0    hydrALAzine HCl 50 MG Oral [facility] Ziggy Espar[facility])        May 13, 2021  2:30 PM CDT Post Op Visit with MD Balbir Funez , [address] [place] [address] ([facility])            [facility] No

## 2021-04-06 NOTE — TELEPHONE ENCOUNTER
Need clarification on orders. Patient just admitted to [facility]. Had surgery with Dr. Miguel Alegre.

## 2021-04-08 NOTE — TELEPHONE ENCOUNTER
Benji duque at [place] has questions on Pt's appointment on 05/13/21 with Dr. Rosita Contreras    She is thinking of cancelling the darryl but she  Would like to talk to a nurse first.

## 2021-04-09 NOTE — TELEPHONE ENCOUNTER
Pt is currently admitted into [facility] for rehab with tentative discharge date of 4/22.  MD tan/cristóbal and chemo appointment that was scheduled on 4/15 had been cancelled yesterday, but spouse requested that [place] from [facility] reach out to discuss with Dr. Toya Alicea

## 2021-04-09 NOTE — TELEPHONE ENCOUNTER
Shiva Garcia RN; Shreya Be has issued the approval for the Enhertu for 18 cycles.  His coming here from rehab for treatment should not have an impact on this approval.  I believe that is only an issue when a Medicare ana m

## 2021-04-13 NOTE — TELEPHONE ENCOUNTER
Patient cancelled 2 week post op appt for today, still at rehab. Can we please call to advise we reviewed this patient at conference and recommend MRI Brain w + wo on 3T scanner in 3 months.      Patient should keep his follow up appt in May for 6 week

## 2021-04-13 NOTE — TELEPHONE ENCOUNTER
Called pt to inform of paukannan [place] below note. Pt verbalized understanding and appreciated call. In addition, sent pt [facility] message with listed provider requests. Pt aware of message being sent.

## 2021-04-15 NOTE — PROGRESS NOTES
Patient is here for MD f/u for Rectal cancer and cycle 5 of Enhertu. Patient is currently at [facility] since hospital discharge. Patient had a suboccipital craniotomy on 3/29. Patient is on Decadron 2 mg daily.  He is working with PT/OT and feels he

## 2021-04-16 NOTE — PROGRESS NOTES
[facility]    PATIENT'S NAME: Mj Kayla   ATTENDING PHYSICIAN: Jenni Cardenas M.D.    PATIENT ACCOUNT #: [de-identified] LOCATION: [address] [place] [address] RECORD #: [id number] YOB: 1965   DATE OF SERVICE: 04/15/2021       CANCER ROSA today.    MEDICATIONS:  His current medications include dexamethasone 2 mg daily, lisinopril 5 mg daily, pantoprazole 40 mg daily, rivaroxaban 20 mg daily, Tylenol 650 mg p.r.n., hydrocodone which he is not taking, ondansetron 4 mg q.6 h. p.r.n., and Angélica time.  I will see him in 3 weeks. By that time, he should be at home. Dictated By Muriel Carver M.D.  d: 04/15/2021 17:54:25  t: 04/16/2021 03:56:49  Job [id number]  HE/    cc: Clifton Abdul M.D. Gabriel Angulo M.D.    Maki Dunlap

## 2021-04-27 NOTE — PATIENT INSTRUCTIONS
Kathy Ramos presents today for continued care following his rectal and colon cancer.       His presenting history is below:   This patient's history starts with a colonoscopy for screening purposes on August 9, 2018. [facility] was found to have 2 polyps and 2 tumor. He is currently on steroids as well. The patient denies any new abdominal complaints. He states that he is tolerating regular diet. He denies abdominal pain, distention, fevers, chills, nausea, vomiting, or weight loss.   He denies any blood, m

## 2021-04-27 NOTE — PROGRESS NOTES
Follow Up Visit Note       Active Problems      1. Cancer of descending colon ([facility] approximately 40 cm, within a large adenomatous polyp    2. Rectal cancer (HCC) at 15 cm between the second and third rectal folds    3. Secondary liver cancer ([facility]    4. recent CEA was on April 15, 2021 and was 27,910  The patient's most recent colonoscopy was performed by myself on April 11, 2019 prior to the takedown of his ileostomy.     He underwent ileostomy takedown on April 24, 2019.      The patient recently underw were available at today's visit. Allergies  Ángel Drake has No Known Allergies. Past Medical / Surgical / Social / Family History    The past medical and past surgical history have been reviewed by me today.     Past Medical History:   Diagnosis Date by mouth once.  , Disp: , Rfl:   •  Pantoprazole Sodium 40 MG Oral Tab EC, Take 1 tablet (40 mg total) by mouth every morning before breakfast., Disp: 30 tablet, Rfl: 0  •  Ondansetron HCl (ZOFRAN) 8 MG tablet, Take 1 tablet (8 mg total) by mouth every 8 ( 120/90 (BP Location: Right arm, Patient Position: Sitting, Cuff Size: adult)   Pulse 114   Temp 97.1 °F (36.2 °C)   Ht 77\"   Wt 246 lb 9.6 oz (111.9 kg)   BMI 29.24 kg/m²   Physical Exam  Vitals and nursing note reviewed.    Constitutional:       General: Content:  Thought content normal.         Judgment: Judgment normal.          Assessment   Cancer of descending colon ([address] approximately 40 cm, within a large adenomatous polyp  (primary encounter diagnosis)  Rectal cancer ([address] at 15 cm between the secon faith on April 24, 2019.      The patient recently underwent craniotomy with Dr. Rosa M Rothman on March 29, 2021 secondary to the increasing size of his brain tumor, cerebral edema, and symptoms of dizziness and unsteady gait.     The patient states that he is s

## 2021-05-06 NOTE — PROGRESS NOTES
Patient is here for MD f/cristóbal and cycle 6 of chemo. Patient was admitted to the hospital on 3/29 and discharged on 4/5. From there patient went to rehab and discharged on 4/18. Patient was diagnosed with shingles while in rehab.  Patient is having pain in left

## 2021-05-11 NOTE — PROGRESS NOTES
[address]    PATIENT'S NAME: Darshan Thomas   ATTENDING PHYSICIAN: Jazmin Jordan M.D.    PATIENT ACCOUNT #: [de-identified] LOCATION: [address] [place] [address] RECORD #: [id number] YOB: 1965   DATE OF SERVICE: 05/06/2021       CANCER ROSA every other day; hydralazine 50 mg q.8 h.; hydrocodone 10/325 is being added today, 1 to 2 tablets q.4 hours p.r.n.; ondansetron 8 mg q.8 h. p.r.n.; pantoprazole 40 mg daily, potassium chloride 20 mEq twice daily; rivaroxaban 20 mg daily.     PHYSICAL EXAMI 01:12:19  [facility] [id number]  /    cc: ALEYDA Pack M.D. Cynthea Manis, M.D. Dr. Lorrayne Jaeger

## 2021-05-13 NOTE — PROGRESS NOTES
[facility]    Andi Gowers Zielke  5/13/2021    Diagnosis: Adenocarcinoma, colon origin, cerebellar metastasis. Chief complaint: Gait disturbance.      Interval History: Suboccipital craniotomy for resection of tumor and radiatio

## 2021-05-13 NOTE — PROGRESS NOTES
Patient here for 6-week postop follow-up sx 03/29/21 for: [address] TUMOR,  MICROSCOPE DISSECTION, NEURO MONITORING    States gait is still unstable. No worse, but not better. Otherwise no other specific complaints.  Here for MR

## 2021-05-17 NOTE — TELEPHONE ENCOUNTER
From: Andi Gowers Zielke  To: Dave Grimes MD  Sent: 5/17/2021 11:47 AM CDT  Subject: Non-Urgent Medical Question    Forgot to ask about the Cardio Echo TTE. The test has been scheduled, but we hadn't spoken about having it done.    Should I continue w

## 2021-05-27 NOTE — PROGRESS NOTES
Patient is here for MD f/cristóbal for Rectal cancer and cycle 7 of Enhertu. Patient had a CT scan on 5/18. Here to discuss results. Patient reports decrease appetite and chronic fatigue. Taking naps daily. Weakness in lower extremities.  Using a cane to assist in

## 2021-05-28 NOTE — TELEPHONE ENCOUNTER
Per Dr Gomez Sanchez, discontinue Enhertu. Patient is not responding to this medication. Dr Gomez Sanchez is looking into different clinical trial options for patient. Informed patient Dr Gomez Sanchez will call him once he has news of any open clinical trials for patient.

## 2021-05-28 NOTE — TELEPHONE ENCOUNTER
Patient called and wants to ask Dr. Charlotte Christianson if he   should continue to take Enhurtu. Please call patient.

## 2021-06-01 NOTE — PROGRESS NOTES
[address]    PATIENT'S NAME: Jennifer Koenig   ATTENDING PHYSICIAN: Kyleigh Oconnor M.D.    PATIENT ACCOUNT #: [de-identified] LOCATION: [address] [place] [address] RECORD #: [id number] YOB: 1965   DATE OF SERVICE: 05/27/2021       CANCER ROSA slightly frail-appearing male in no acute distress. VITAL SIGNS:  His performance status is 1. His weight is 249 pounds. He is 6 feet 5 inches. Blood pressure is 115/80, pulse 107, respiratory rate is 20, temperature is 97.6. HEENT:  Alopecia.   He has week after communicating with Dr. Peri Figueroa and then we can decide about his next line treatment. 2.   Anemia:  His hemoglobin is a bit worse.   His iron studies are somewhat equivocal.  It may be reasonable to go ahead and give him an iron infusion, and I will

## 2021-06-01 NOTE — TELEPHONE ENCOUNTER
Patient was informed that his labs are equivacal and Dr Diony Hobbs is recommending an iron infusion.  PSR will call the patient to schedule an appointment

## 2021-06-04 NOTE — PROGRESS NOTES
We originally heard that Alexis Swenson would be eligible for a trial at [facility]. On further review they found out he is not eligible due to low albumin levels. As a result we are starting him on [facility]. Orders placed. Will need teaching visit and consent form.

## 2021-06-04 NOTE — PROGRESS NOTES
Education Record    Learner:  Patient    Disease / Diagnosis: JEMMA - INFED    Barriers / Limitations:  None   Comments:    Method:  Discussion   Comments:    General Topics:  Medication, Side effects and symptom management and Plan of care reviewed   Narciso

## 2021-06-09 NOTE — TELEPHONE ENCOUNTER
Nutrition PHONE Consultation    Patient Name: Danilo Luong  YOB: 1965  Medical Record Number: [id number]   Account Number: [de-identified]  Dietitian: Arash Goldsmith RD, [place]    Date of visit: 6/9/2021    Diet Rx: high protein/calorie as erwin DENTA 5000 PLUS 1.1 % Dental Cream, nightly.  , Disp: , Rfl:     Pertinent Labs: ALB 1.8 mg/dl    Height: 6'5\"       IBW: 208 +/- 10%    WT HX:   05/27/21: 249 lbs 8 oz  04/15/21: 258 lbs    Estimated Nutrition Needs: 25-30 kcals/kg = 4881-0962 KCALS/d; 1

## 2021-06-09 NOTE — PROGRESS NOTES
ORAL CHEMOTHERAPY EDUCATION RECORD  Learner:  Patient and Spouse  Barriers / Limitations:  None    Diagnosis:   Rectal cancer  Chemo Drug/Dose/Frequency:   Elihue Huger 4 tablets (80mg) twice daily on days 1-5 and 8-12        Administration Guidelines:  Take Information Sheet  Side Effect Management Information Sheet  Selca Support [employer] Information sheet    Patient and caregiver were given ample opportunity to ask questions. All questions and concerns addressed.   We discussed se

## 2021-06-17 PROBLEM — K92.2 GASTROINTESTINAL HEMORRHAGE, UNSPECIFIED GASTROINTESTINAL HEMORRHAGE TYPE: Status: ACTIVE | Noted: 2021-01-01

## 2021-06-17 PROBLEM — K92.2 GASTROINTESTINAL HEMORRHAGE: Status: ACTIVE | Noted: 2021-01-01

## 2021-06-17 PROBLEM — D64.9 ANEMIA: Status: ACTIVE | Noted: 2021-01-01

## 2021-06-17 NOTE — TELEPHONE ENCOUNTER
Rectal Cancer - Merrick Duarte having rectal bleeding on tissue and in bowel, last night weakness increased and he and his wife using gait belt to get him off toilet he had a fall, they debated calling ambulance or waiting until today.   They alvarado

## 2021-06-17 NOTE — ED INITIAL ASSESSMENT (HPI)
Patient to ED reports with increased weakness, unable to get off the toilet last night. Patient is a cancer patient, started new chemo on Monday.    + swelling to belly and chest.  On xarelato, + bloody stool since last night

## 2021-06-17 NOTE — ED PROVIDER NOTES
Patient Seen in: [facility] Emergency Department      History   Patient presents with:  GI Bleeding  Fatigue    Stated Complaint: fall off toilet yesterday- no loc- weak d/t chemo     HPI/Subjective:   HPI    61-year-old pleasant male presents emerg yesterday- no loc- weak d/t chemo   Other systems are as noted in HPI. Constitutional and vital signs reviewed. All other systems reviewed and negative except as noted above.     Physical Exam     ED Triage Vitals [06/17/21 1124]   /79   Pulse 1 COMP METABOLIC PANEL (14) - Abnormal; Notable for the following components:       Result Value    Sodium 133 (*)     Calcium, Total 8.0 (*)     Calculated Osmolality 274 (*)      (*)     Alkaline Phosphatase 582 (*)     Bilirubin, Total 3.2 (*) 18.6 (*)     RDW-SD 66.4 (*)     Neutrophil Absolute Prelim 13.40 (*)     Neutrophil Absolute 13.40 (*)     Lymphocyte Absolute 0.72 (*)     All other components within normal limits   CBC W/ DIFFERENTIAL - Abnormal; Notable for the following components: PLATELET    Narrative: The following orders were created for panel order CBC With Differential With Platelet.   Procedure                               Abnormality         Status                     ---------                               ----------- lactulose. GI came down to bedside. Patient was given some IV fluids as his pressure was low. Do feel he needs to go to the ICU. We will discuss case with pulmonary. Hospitalist also contacted.     Patient was seen immediately upon arrival due to a hig visible dilatation or calcification. PANCREAS:  No lesion, fluid collection, ductal dilatation, or atrophy. SPLEEN:  No enlargement or focal lesion. KIDNEYS:  Stable left parapelvic cysts. No obstruction or calcification.   ADRENALS:  No mass or enlarge admission for further management of medical condition. Patient was stable in the emergency department and will be transferred to floor for further definitive care. Patient's questions were answered.     This note was prepared using [employer] re

## 2021-06-17 NOTE — TELEPHONE ENCOUNTER
Kelleymaria Israel started new medication on Monday he has bleeding from rectum and blotting in stomach. No other symptoms.  ester

## 2021-06-17 NOTE — H&P
[facility] HOSPITALIST  History and Physical     Valentine Platt Patient Status:  Emergency    1965 MRN [id number]   Location [facility] Attending Raquel Ji MD   Hosp Day # 0 PCP [address]     Chief Complaint: rectal b week. He reports that he does not use drugs.     Family History:   Family History   Problem Relation Age of Onset   • Diabetes Father         borderline   • Lipids Father    • Obesity Father    • Lipids Mother    • Obesity Mother    • Cancer Paternal Medina Rosie Normocephalic atraumatic. Moist mucous membranes. EOM-I. PERRLA. Anicteric. Neck: No lymphadenopathy. No JVD. No carotid bruits. Respiratory: Clear to auscultation bilaterally. No wheezes. No rhonchi. Cardiovascular: S1, S2. Regular rate and rhythm.  No 15  -N.p.o. until seen by surgery and GI    2. Widespread rectal CA with brain metastasis as well  -Undergoing chemotherapy by Dr. Jostin Navas    3.   Steroid induced myopathy    4.  Elevated alk phos AST  -Chronic  -Significantly low albumin at 1.4  -Low calci

## 2021-06-18 NOTE — CM/SW NOTE
SW met with pt and wife to discuss  [employer] and goals of care. Plan to transfer home with hospice tomorrow. Admission scheduled for home between 5/6 . Wife plan to drive him home in afternoon.

## 2021-06-18 NOTE — CM/SW NOTE
CM noted Hospice order written by Palliative Care APN, CM spoke with Maki Gaspar at [facility]. and they will follow up with patient.  &  to remain available and supportive for discharge planning needs.     Martha Chaudhary RN C

## 2021-06-18 NOTE — PROGRESS NOTES
[facility] HOSPITALIST  Progress Note     Jose Martin Zamudio Patient Status:  Observation    1965 MRN [id number]   [facility] 4NW-A Attending Radha August MD   Hosp Day # 0 PCP Te[facility]     Chief Complaint: rectal bleeding    S: Patie BILT 3.2* 3.5*   TP 6.2* 5.9*       Estimated Creatinine Clearance: 149.9 mL/min (based on SCr of 0.71 mg/dL).     Recent Labs   Lab 06/17/21  1318 06/18/21  0843   PTP 45.5* 26.9*   INR 4.74* 2.42*            COVID-19 Lab Results    COVID-19  Lab Results

## 2021-06-18 NOTE — PLAN OF CARE
NURSING ADMISSION NOTE      Patient admitted via Cart  Oriented to room. Safety precautions initiated. Bed in low position. Call light in reach. Pt arrived from ER, alert and oriented x4, vitals stable, afebrile, on RA. On Tele freddy Reyes

## 2021-06-18 NOTE — PROGRESS NOTES
Awke , alert , afebrile , denies discomfort , denies nausea, appetite fair , dr Evin Brandon in, [facility], Hospice rep in , continues w/ blood streaked stool

## 2021-06-18 NOTE — CONSULTS
[address]  [id number]  Hospital Day #0  Date of Consult:  6/18/2021        Reason for Consultation:      Consult requested by Dr. Tata Johansen for evaluation of palliative care needs, goals of ca Status: can and stand by assist. We discussed at length safety measures at home and his wife having the ability to  what he can do and not do.          Allergies: No Known Allergies    Medications:       Current Facility-Administered Medications:   • (ER)  Narrative: PROCEDURE:  CT ABDOMEN PELVIS IV CONTRAST, NO ORAL (ER)     COMPARISON:  [place], CT, CT CHEST+ABDOMEN+PELVIS(ALL CNTRST ONLY)(CPT=71260/79152), 5/18/2021, 4:32 PM.     INDICATIONS:  fall off toilet yesterday- no loc- weak d/t chemo , L example a 3.3 cm mass is present. LUNG BASES:  Trace pleural fluid with bilateral pulmonary nodules predominantly within the right lower lobe, without significant change. Impression: CONCLUSION:    1.  When compared to most recent CT any work Max Assist  Total Care Reduced Drowsy/confused   20 Bedbound Extensive Disease  Can’t do any work Max Assist  Total Care Minimal Drowsy/confused   10 Bedbound/coma Extensive Disease  Can’t do any work  coma  Max Assist  Total Care Mouth care Drows Advance Care Planning counseling and discussion:  Patient's preference about sharing medical information: his wife  Patient's decision making preferences: himself    POLST/CODE STATUS: We discussed the risks vs benefits of life sustaining treatments

## 2021-06-18 NOTE — PROGRESS NOTES
Request for consultation for rectal bleeding. Pt with known Rectal Ca and brain tumor. Pt under the care of Dr. Rj Hanks. No acute issues now. Dr. Rj Hanks to evaluate tomorrow.      Coliln Blackwell MD

## 2021-06-18 NOTE — CONSULTS
[facility]  Report of Consultation    Daniele Zazueta Patient Status:  Observation    1965 MRN [id number]   [place] [facility] 4NW-A Attending Patric Strickland MD   Hosp Day # 0 PCP Kayy Delgado     Reason for Consultation:  Consulted by  and has had a decreased appetite. The patient was on Xarelto, however that has been stopped for the last 24 hours secondary to the bleeding. He does have an IVC filter in place secondary to previous DVT.     History:  Past Medical History:   Diagnosis D allergies complete, up to date.   Cardiovascular:  Negative for cool extremity and irregular heartbeat/palpitations  Constitutional: See HPI  Endocrine:  Negative for abnormal sleep patterns, increased activity, polydipsia and polyphagia  ENMT:  Negative fo 06/18/21  0844   RBC 2.89* 2.78*   HGB 8.7* 8.1*   HCT 28.5* 28.0*   MCV 98.6 100.7*   MCH 30.1 29.1   MCHC 30.5* 28.9*   RDW 18.6* 19.0*   NEPRELIM 13.40* 12.45*   WBC 15.2* 14.5*   .0 210.0       Recent Labs   Lab 06/17/21  1259 06/18/21  0843   G decision. 4. The patient may continue to have a diet as tolerated from a surgical standpoint for comfort  5.  Palliative to see patient to discuss future care and treatment    Time spent on counseling/coordination of care:  45 Minutes    Total time spent w bleeding at this moment. I did an aggressive exam to see if there was a palpable anastomosis for this patient's prior low anterior resection, I could not feel it via rectal examination. There are no masses in the rectal vault.     My assessment of this pa

## 2021-06-18 NOTE — CONSULTS
[facility]  Report of Consultation    Anne-Marie Orf Patient Status:  Observation    1965 MRN [id number]   [facility] 4NW-A Attending Anil Leal MD   Hosp Day # 0 PCP [address]     Reason for Consultation:    Weakness an He reports that he does not use drugs. Allergies:  No Known Allergies    Medications:    Current Facility-Administered Medications:   •  Ondansetron HCl (ZOFRAN) tab 4 mg, 4 mg, Oral, Q8H PRN  •  HYDROmorphone HCl (DILAUDID) 1 MG/ML injection 0.2 mg, 0. Collection Time: 06/17/21 12:59 PM   Result Value Ref Range    Hold Gold Auto Resulted    Comp Metabolic Panel (14)    Collection Time: 06/17/21 12:59 PM   Result Value Ref Range    Glucose 98 70 - 99 mg/dL    Sodium 133 (L) 136 - 145 mmol/L    Josh Morphology Scan    Collection Time: 06/17/21 12:59 PM   Result Value Ref Range    RBC Morphology See morphology below (A) Normal, Slide reviewed, see previous RBC morphology.     Platelet Morphology Normal Normal    Hypochromia 1+      Microcytosis 1+ polyp     Adenomatous polyp of colon     Secondary liver cancer ([facility] [address]     Secondary carcinoma of right lung ([facility] [address]     Secondary adenocarcinoma of left lung (HCC)     Anemia, unspecified     Acute deep vein thrombosis (DVT) of popliteal vein of left lower e

## 2021-06-19 PROBLEM — K64.8 INTERNAL AND EXTERNAL PROLAPSED HEMORRHOIDS: Status: ACTIVE | Noted: 2021-01-01

## 2021-06-19 NOTE — PLAN OF CARE
A&Ox4, RA, no tele. Denies pain, nausea and SOB. NS 0.9 infusing via R port a cath @ 100 ml/hr. Plan to dc tomorrow w/ home hospice. Patient stated he had a question about the bed he will get at home, told I would endorse to day shift nurse.  Call light w/i

## 2021-06-19 NOTE — HOSPICE RN NOTE
[facility] visit for follow up on hospice consult and discharge planning. This patient is ready to go home with hospice. Met with patient, his mother and sister at bedside. Questions and concerns addressed.  Had now POLST form signed and is

## 2021-06-19 NOTE — PROGRESS NOTES
[facility]  Progress Note    Horace Platt Patient Status:  Observation    1965 MRN [id number]   [facility] 4NW-A Attending Daniele Dawn MD   Hosp Day # 0 PCP Emelyn Guajardo [facility]     Subjective:  No new complaints, incisional pain.  Aw of popliteal vein of left lower extremity (HCC)     Pruritus ani     Secondary cancer of bone (HCC)     Secondary cancer of brain ([address]     Blood pressure elevated without history of HTN     Leukocytosis     Protein malnutrition (HCC)     Tachycardia     L

## 2021-06-19 NOTE — PROGRESS NOTES
Hematology/Oncology Progress Note    Patient Name: Mecca Yo  Medical Record Number: [id number]    YOB: 1965     Reason for Consultation:  Mecca Yo was seen today for the diagnosis of metastatic colon cancer    Interval events: 06/17/21  1318 06/18/21  0843   INR 4.74* 2.42*   PTT 63.2*  --      Impression & Plan:     *Metastatic colon cancer  -Has progressed on multiple lines of therapy, most recently on Lonsurf however has developed rapid rise in bilirubin and rectal bleeding.

## 2021-06-19 NOTE — PROGRESS NOTES
Weakness Hx fall at home, hospitalist at bedside, hospice rep in , awake , alert , respirations easy , denies rectal bleeding, plans to go home w/ [facility] today 1430; discharged for home accompanied by spouse, To meet hospice at 4 pm

## 2021-06-22 NOTE — TELEPHONE ENCOUNTER
Spoke with patient. Appointments cancelled per request. MD updated. Instructed patient to call our office with any further needs.

## 2021-07-02 NOTE — PROGRESS NOTES
Pt is able to sit up and ambulate without difficulty. Pt's vital signs are stable. Procedural access site is dry and intact with no signs and symptoms of bleeding or hematoma. Port flushed with heparin and access removed. Pt tolerated food/fluids.  Instruct

## 2021-07-05 NOTE — DISCHARGE SUMMARY
[facility] HOSPITALIST  DISCHARGE SUMMARY     Elizabeth Platt Patient Status:  Observation    1965 MRN [id number]   [facility] 4NW-A Attending No att. providers found   Hosp Day # 0 PCP Quique Santoro     Date of Admission: 2021  Date MG Tabs      Take 500 mg by mouth daily. Refills: 0     Potassium Chloride ER 10 MEQ Tbcr  Commonly known as: K-DUR      Take 20 mEq by mouth daily.    Refills: 0     Prochlorperazine Maleate 10 mg tablet  Commonly known as: COMPAZINE      Take 1 tablet (

## 2021-07-22 ENCOUNTER — TELEPHONE (OUTPATIENT)
Dept: HEMATOLOGY/ONCOLOGY | Facility: HOSPITAL | Age: 56
End: 2021-07-22

## 2021-07-22 NOTE — TELEPHONE ENCOUNTER
Cassandra calling to let Dr Tata Johansen know that Aye Martinez passed away last night at 17:51pm. Family present. Thank you for the privilege.  Thank you bill

## 2021-08-03 RX ORDER — POTASSIUM CHLORIDE 750 MG/1
TABLET, FILM COATED, EXTENDED RELEASE ORAL
Qty: 120 TABLET | Refills: 3 | OUTPATIENT
Start: 2021-08-03

## 2022-03-02 NOTE — LETTER
[facility] Testing Department  Phone: [phone]  Right Fax: [phone]  [address] Minerva Wills RN Date: 3/17/21    Patient Name: Mima Roberts  Surgery Date: 3/29/2021    CSN: [id number]  Medical Record: [id number]
[facility] Testing Department  Phone: [phone]  Right Fax: [phone]    ADDRESSEE INFORMATION: SENDER INFORMATION:   To:   Chalino Herrera MD From: Maylin Harris     Department: Pre-Admission Testing   Fax Number: [phone] Date: 3/10/2
[facility] Testing Department  Phone: [phone]  Right Fax: [phone]  [facility] By:  Brianna Lindo Date: 3/15/21    Patient Name: Gunnar Crejerrell  Surgery Date: 3/29/2021    CSN: [id number]  Medical Record: [id number]   
Xray Knee 3 Views, Right

## 2023-01-01 NOTE — PROGRESS NOTES
66 [employer] forms received and completed by patient and Dr. Christy Huang. Forms faxed to [facility] at [phone] and confirmation received, copies left in Vicki's drawer. B positive

## 2024-03-20 NOTE — PROGRESS NOTES
Dx:       Gait instability   Unsteadiness        Authorized # of Visits:  No prior authorization is required per appointment notes       Next MD visit: none scheduled  Fall Risk: standard           Precautions: n/a           Subjective:  Patient reports Hi. I am not sure if she needs labs for another provider, but she had an INR drawn today 3/20 and we are requesting another INR to be drawn around 3/27 please. Please call the pt to set up and the order is in the chart.     Thank you,  Ani   been difficult to address issues around balance. At this point, Mr. Mere Bishop is tolerating light resistance exercise. Goals:   1.   Patient will demonstrate the ability to walk with changing velocities allowing for the head movements, starting and stoppin

## (undated) DEVICE — PROXIMATE RELOADABLE LINEAR STAPLER: Brand: PROXIMATE

## (undated) DEVICE — TROCAR: Brand: KII® SLEEVE

## (undated) DEVICE — D-58 TAPERED ROUTER

## (undated) DEVICE — STAPLER 45 RELOAD BLUE: Brand: ENDOWRIST

## (undated) DEVICE — CLIP HEMOLOK LARGE PURPLE

## (undated) DEVICE — VISUALIZATION SYSTEM: Brand: CLEARIFY

## (undated) DEVICE — DRAPE WARMER ORS-300

## (undated) DEVICE — CADIERE FORCEPS: Brand: ENDOWRIST

## (undated) DEVICE — STERILE POLYISOPRENE POWDER-FREE SURGICAL GLOVES: Brand: PROTEXIS

## (undated) DEVICE — SOL  .9 1000ML BAG

## (undated) DEVICE — HEX-LOCKING BLADE ELECTRODE: Brand: EDGE

## (undated) DEVICE — BATTERY PACK FOR VARISPEED: Brand: STRYKER VARISPEED

## (undated) DEVICE — ARM DRAPE

## (undated) DEVICE — SEAL

## (undated) DEVICE — SUTURE PROLENE 2-0 SH

## (undated) DEVICE — COVER,MAYO STAND,STERILE: Brand: MEDLINE

## (undated) DEVICE — STERILE SYNTHETIC POLYISOPRENE POWDER-FREE SURGICAL GLOVES WITH HYDROGEL COATING: Brand: PROTEXIS

## (undated) DEVICE — DERMABOND LIQUID ADHESIVE

## (undated) DEVICE — PROXIMATE LINEAR CUTTER RELOAD (STNADARD) , BLUE, 55MM: Brand: PROXIMATE

## (undated) DEVICE — SUTURE VICRYL 0

## (undated) DEVICE — LIGASURE LAP MARYLAND 37CM

## (undated) DEVICE — VIOLET BRAIDED (POLYGLACTIN 910), SYNTHETIC ABSORBABLE SUTURE: Brand: COATED VICRYL

## (undated) DEVICE — KENDALL SCD EXPRESS SLEEVES, KNEE LENGTH, MEDIUM: Brand: KENDALL SCD

## (undated) DEVICE — 2.0MM ROUND FLUTED SOFT TOUCH

## (undated) DEVICE — PROXIMATE RELOADABLE LINEAR CUTTER WITH SAFETY LOCK-OUT.  55MM LINEAR CUTTER.: Brand: PROXIMATE

## (undated) DEVICE — GAUZE SPONGES,USP TYPE VII GAUZE, 12 PLY: Brand: CURITY

## (undated) DEVICE — STAPLER CIRCULAR ENDO 29MM

## (undated) DEVICE — FENESTRATED BIPOLAR FORCEPS: Brand: ENDOWRIST

## (undated) DEVICE — HEMOCLIP HORIZON MED 002200

## (undated) DEVICE — GOWN,SIRUS,FABRIC-REINFORCED,X-LARGE: Brand: MEDLINE

## (undated) DEVICE — MARKER SKIN PREP RESIST STRL

## (undated) DEVICE — TOWEL SURG OR 17X30IN BLUE

## (undated) DEVICE — SUTURE VICRYL 2-0

## (undated) DEVICE — MONOFILAMENT ABSORBABLE SUTURE: Brand: MAXON

## (undated) DEVICE — BLADELESS OBTURATOR: Brand: WECK VISTA

## (undated) DEVICE — PAD SACRAL SPAN AID

## (undated) DEVICE — 3M(TM) TEGADERM(TM) TRANSPARENT FILM DRESSING FRAME STYLE 9505W: Brand: 3M™ TEGADERM™

## (undated) DEVICE — PROXIMATE SKIN STAPLERS (35 WIDE) CONTAINS 35 STAINLESS STEEL STAPLES (FIXED HEAD): Brand: PROXIMATE

## (undated) DEVICE — ALCOHOL 70% 4 OZ

## (undated) DEVICE — OSTOMY POUCH CLAMP: Brand: HOLLISTER

## (undated) DEVICE — PERMANENT CAUTERY HOOK: Brand: ENDOWRIST

## (undated) DEVICE — TUBING CYSTO

## (undated) DEVICE — STAPLER 45: Brand: ENDOWRIST

## (undated) DEVICE — SUCTION CANISTER, DISPOSABLE

## (undated) DEVICE — BULB SYRINGE,IRRIGATION WITH PROTECTIVE CAP: Brand: DOVER

## (undated) DEVICE — RANEY SCALP CLIP STERILE: Brand: AESCULAP

## (undated) DEVICE — DRAPE MICROSCOPE NEURO PENTERO

## (undated) DEVICE — MEDI-VAC TUBING CONNECTOR 6-IN-1 "Y" POLYPROPYLENE: Brand: CARDINAL HEALTH

## (undated) DEVICE — CRANIOTOMY CDS: Brand: MEDLINE INDUSTRIES, INC.

## (undated) DEVICE — SPONGE STICK WITH PVP-I: Brand: KENDALL

## (undated) DEVICE — CODMAN® DISPOSABLE PERFORATOR 14MM: Brand: CODMAN®

## (undated) DEVICE — 1.1MM X 6.0MM STRAIGHT ROUTER

## (undated) DEVICE — CANNULA SEAL

## (undated) DEVICE — OIL CARTRIDGE: Brand: CORE, MAESTRO

## (undated) DEVICE — SUTURE PDS II 1 TP-1

## (undated) DEVICE — SOL  .9 1000ML BTL

## (undated) DEVICE — DRAIN CHANNEL 19FR BLAKE

## (undated) DEVICE — CODMAN® INTEGRATED BIPOLAR CORD AND TUBING SET FLYING LEADS, ROTARY PUMP: Brand: CODMAN®

## (undated) DEVICE — CODMAN® SURGICAL PATTIES 1/2" X 1/2" (1.27CM X 1.27CM): Brand: CODMAN®

## (undated) DEVICE — 40580 - THE PINK PAD - ADVANCED TRENDELENBURG POSITIONING KIT: Brand: 40580 - THE PINK PAD - ADVANCED TRENDELENBURG POSITIONING KIT

## (undated) DEVICE — LARGE HEM-O-LOK CLIP APPLIER: Brand: ENDOWRIST

## (undated) DEVICE — VESSEL SEALER: Brand: ENDOWRIST

## (undated) DEVICE — ENDOPATH ULTRA VERESS INSUFFLATION NEEDLES WITH LUER LOCK CONNECTORS: Brand: ENDOPATH

## (undated) DEVICE — BETADINE SOLUTION 8 OZ BTL

## (undated) DEVICE — Device

## (undated) DEVICE — 1010 S-DRAPE TOWEL DRAPE 10/BX: Brand: STERI-DRAPE™

## (undated) DEVICE — LAP COLON CDS: Brand: MEDLINE INDUSTRIES, INC.

## (undated) DEVICE — SPECIMEN CONTAINER,POSITIVE SEAL INDICATOR, OR PACKAGED: Brand: PRECISION

## (undated) DEVICE — SUTURE VICRYL 5-0 PC-1

## (undated) DEVICE — DIFFUSER: Brand: CORE, MAESTRO

## (undated) DEVICE — POOLE SUCTION HANDLE: Brand: CARDINAL HEALTH

## (undated) DEVICE — CODMAN® SURGICAL PATTIES 1/2" X1 1/2" (1.27CM X 3.81CM): Brand: CODMAN®

## (undated) DEVICE — CHLORAPREP 26ML APPLICATOR

## (undated) DEVICE — SIGMOIDOSCOPE LIGHTED BIOSEAL

## (undated) DEVICE — 11.1-MULTIMODALITY IOM KIT

## (undated) DEVICE — 3M(TM) MEDIPORE(TM) +PAD SOFT CLOTH ADHESIVE WOUND DRESSING 3569: Brand: 3M™ MEDIPORE™

## (undated) DEVICE — BLUNT CANNULA: Brand: MONOJECT

## (undated) DEVICE — CAUTERY CORD BIPOLAR YASARGIL

## (undated) DEVICE — LAPAROTOMY CDS: Brand: MEDLINE INDUSTRIES, INC.

## (undated) DEVICE — LIGHT HANDLE

## (undated) DEVICE — COLUMN DRAPE

## (undated) DEVICE — REDUCER: Brand: ENDOWRIST

## (undated) DEVICE — SUTURE NUROLON 4-0 TF

## (undated) DEVICE — TROCAR: Brand: KII SHIELDED BLADED ACCESS SYSTEM

## (undated) DEVICE — TIP-UP FENESTRATED GRASPER: Brand: ENDOWRIST

## (undated) DEVICE — WOUND RETRACTOR AND PROTECTOR: Brand: ALEXIS O WOUND PROTECTOR-RETRACTOR

## (undated) DEVICE — LARGE SUTURE CUT NEEDLE DRIVER: Brand: ENDOWRIST

## (undated) DEVICE — FLOSEAL HEMOSTATIC MATRIX, 5ML: Brand: FLOSEAL HEMOSTATIC MATRIX

## (undated) DEVICE — SUTURE VICRYL 0 CT-1

## (undated) DEVICE — 3M(TM) MICROPORE TAPE DISPENSER 1535-2: Brand: 3M™ MICROPORE™

## (undated) DEVICE — SYRINGE 30ML LL TIP

## (undated) DEVICE — GAUZE SPONGES,12 PLY: Brand: CURITY

## (undated) DEVICE — TOWEL OR BLU 16X26 STRL

## (undated) DEVICE — SUTURE VICRYL 2-0 CT-2

## (undated) DEVICE — BAG DRAIN INFECTION CNTRL 2000

## (undated) DEVICE — SUTURE ETHILON 2-0 FS

## (undated) DEVICE — DRAPE EQUIPMENT INTRATEMP THER

## (undated) DEVICE — STAPLER SHEATH: Brand: ENDOWRIST

## (undated) DEVICE — TISSUE RETRIEVAL SYSTEM: Brand: INZII RETRIEVAL SYSTEM

## (undated) DEVICE — 12CM IQ STANDARD: Brand: SONOPET IQ

## (undated) DEVICE — 3M™ TEGADERM™ TRANSPARENT FILM DRESSING, 1626W, 4 IN X 4-3/4 IN (10 CM X 12 CM), 50 EACH/CARTON, 4 CARTON/CASE: Brand: 3M™ TEGADERM™

## (undated) DEVICE — SOL  .9 3000ML

## (undated) DEVICE — ELECTRO LUBE IS A SINGLE PATIENT USE DEVICE THAT IS INTENDED TO BE USED ON ELECTROSURGICAL ELECTRODES TO REDUCE STICKING.: Brand: KEY SURGICAL ELECTRO LUBE

## (undated) DEVICE — 3M(TM) MEDIPORE(TM) +PAD SOFT CLOTH ADHESIVE WOUND DRESSING 3566: Brand: 3M™ MEDIPORE™

## (undated) DEVICE — DRAIN RELIAVAC 100CC

## (undated) DEVICE — Device: Brand: INTELLICART™

## (undated) DEVICE — LIGACLIP EXTRA LIGATING CLIP CARTRIDGES: 6 TITANIUM CLIPS/ CARTRIDGE (SMALL): Brand: LIGACLIP

## (undated) NOTE — LETTER
20    Patient: Gabrielle Lawton  : 1965 Visit date: 2020    Dear  Dr. Onur Zuluaga MD,    Thank you for referring Gabrielle Lawton to my practice. Please find my assessment and plan below.         Assessment   Rectal cancer (HCC) at 15 cm betw He underwent ileostomy takedown on April 24, 2019.      He is seeing Dr. Susu Craven for his chemotherapy course. The patient reports that he was told that his metastases are decreasing.  He restarted chemotherapy 3-5 weeks post operatively from his takedown pro continued care and evaluation of his cancer I will see him next in May or June 2020.            Sincerely,       Daniel High MD   CC: Ludy Gomes MD

## (undated) NOTE — LETTER
19    Patient: Mariza Eden  : 1965 Visit date: 2019    Dear  Dr. Amado Robertson MD,    Thank you for referring Mariza Eden to my practice. Please find my assessment and plan below.       Assessment   Cancer of descending colon ([facility] [address] This demonstrated progressing liver metastasis and a new lesion. The pulmonary lesions were stable. The patient's most recent CEA was on November 7, 2019 and had increased to 331.   The patient's most recent colonoscopy was performed by myself on April visit.  I discussed with the patient that we do not have to pursue any treatment of his hemorrhoids or his pruritus ani as they are asymptomatic at this time.     The patient will continue to work with Dr. Alyce Terrazas as well as his insurance and the drug compan

## (undated) NOTE — LETTER
19    Patient: Hipolito Robbins  : 1965 Visit date: 2019    Dear  Dr. Emperartiz Fabian MD,    Thank you for referring Massillonadama Robbins to my practice. Please find my assessment and plan below.         Assessment   Rectal cancer (HCC) at 15 cm betw chemotherapy 3-5 weeks post operatively from his takedown procedure. His last dose of chemotherapy was June 21, 2019. He is currently waiting for the next round of his chemotherapy to be approved by his insurance.      The patient had a DVT of the left leg

## (undated) NOTE — LETTER
[facility] [address], [address]  Consent for Procedure/Sedation    Date:     Time:       1. I authorize the performance upon Adrien Calle the following:  INFERIOR VENA CAVA FILTER IMPLANT     2.  I authorize Dr. Sarika Figueredoand gentry ________________________________    ___________________    Witness: _________________________      Date: ___________________    Printed: 2019   1:12 PM  Patient Name: Yulisa Meyer        : 1965       Medical Record #: [id number]

## (undated) NOTE — LETTER
[facility] [address], [address]  Consent for Procedure/Sedation    Date:     Time:       1. I authorize the performance upon Nitesh Copping the following:  CENTRAL VENOUS ACCESS PORT IMPLANT    2.  I authorize Dr. Chacho Whipple wh ________________________________    ___________________    Witness: _________________________      Date: ___________________    Printed: 2018   4:44 PM  Patient Name: Daniele Zazueta        : 1965       Medical Record #: [id number]

## (undated) NOTE — LETTER
10/20/18    Patient: Jesus Simmons  : 1965 Visit date: 10/18/2018    Dear  Dr. Norm James MD,    Thank you for referring Jesus Simmons to my practice. Please find my assessment and plan below.              Assessment   Rectal cancer ([facility] [address] at 15 c verge, Dr. Angeline Adam stated that he felt that with a mesorectal excision, further adjuvant radiation treatment preoperatively would not necessarily be helpful.     Since we already consulted Dr. Angeline Adam verbally, I feel that it would be prudent for him to follo

## (undated) NOTE — LETTER
3/4/2021  Dear Dr. Marya Whittington,    Hem-Onc clearance is being requested for surgery. Procedure: Craniotomy for Tumor with Dr. Karla Camejo  Date of Procedure: 3/29/2021    Your patient is currently taking Bruce Linear. Hollie Johnson usually recommends the medication be held for 3-5 days prior to surgery. Please verify patient is cleared to proceed with surgical procedure and is clear to discontinue Xeralto until after surgery. Clearance Approved   ____________ Clearance Denied       ____________     Permission to hold Bruce Linear _____days prior to surgery. Comments: ______________________________________________________    Signature: ________________________________  Date: _________________    Please fax this clearance request to our office at fax # [phone]     Pre-op labs will be done at THE [facility] and are scheduled by our Pre-Admission department. Please fax your clearance to the pre-admission department at [address] [facility]. [phone] or our office [phone]   The following labs will be placed with the surgery order and are scheduled by  Juni Miguel:   CBC   Comprehensive Metabolic Panel   PT/PTT/INR   MRSA/MSSA Nasal Swab   Covid-19 test    If you have any questions, you may contact our office at [phone], option # 2.     Thank you,    ANDRE Staff

## (undated) NOTE — LETTER
20    Patient: Shaka Diaz  : 1965 Visit date: 2020    Dear  Dr. Jamir Ann MD,    Thank you for referring Shaka Diaz to my practice. Please find my assessment and plan below.         Assessment   Rectal cancer (HCC) at 15 cm betw The patient was trialed on anti-HER-2 chemotherapy drugs with Dr. Susu Craven. The patient was not responding to these drugs. The patient was subsequently referred downtown to the [address] [place] [address] for clinical trials of new chemotherapy agents.   Unfortun

## (undated) NOTE — LETTER
Printed: 2020    Patient Name: Kendrick Pelaez  : 1965   Medical Record #: [id number]    Consent to Cancer Treatment    I, Kendrick Pelaez, understand that I have been diagnosed with metastatic colon cancer.     I understand that the treat I have had the chance to ask questions about this treatment, and my questions have been answered to my satisfaction. I understand that I can contact my oncologist,  or my Cancer Care Team at any time if I have questions, by calling [phone].    Allisono

## (undated) NOTE — LETTER
19    Patient: Gabrielle Lawton  : 1965 Visit date: 2019    Dear  Dr. Onur Zuluaga MD,    Thank you for referring Gabrielle Lawton to my practice. Please find my assessment and plan below.              Assessment   Rectal cancer (HCC) at 15 cm CC: Shabana Bowen MD

## (undated) NOTE — LETTER
Printed: 2018    Patient Name: Gabrielle Lawton  : 1965   Medical Record #: [id number]    Consent to Cancer Treatment    I, Gabrielle Lawton, understand that I have been diagnosed with Rectal Cancer (stage IV).     I understand that the rosemary questions have been answered to my satisfaction. I understand that I can contact my oncologist, Dr Susu Craven, or my Cancer Care Team at any time if I have questions, by calling [facility] at [phone].    Additional written information will be gi

## (undated) NOTE — LETTER
Printed: 2021    Patient Name: Matt Allen  : 1965   Medical Record #: [id number]    Consent to Cancer Treatment    I, Matt Allen, understand that I have been diagnosed with rectal cancer (stage IV).     I understand that the treat if I have questions, by calling [phone]. Additional written information will be given to me prior to start of therapy. Additionally, I will receive a copy of this consent form. I have read and fully understand this consent to cancer treatment.

## (undated) NOTE — LETTER
Patient Name: Kendrick Pelaez  YOB: 1965          MRN number:  [id number]  Date:  8/20/2020  Referring Physician: Dr. Rupinder Orozco    Discharge Summary  Number of Visits Attended 8 in [facility]    Dear Dr. Rupinder Orozco,     Discharge Summary  Dx: improved from 20/30 at evaluation to 24/30 this date, demonstrating reduced fall risk. Overall stability is improved with static balance SLS up to >10s B from a few seconds each at evaluation.  Demonstrates improved ability to perform anterior weight shift · Pt will be able to look over shoulder while walking without mis step to improve safety with walks outdoors (improved but not met)   · Pt will improve FGA score to >22 to demonstrate decreased fall risk.   MET   · Pt will improve global hip strength to 4+/

## (undated) NOTE — LETTER
19    Patient: Cielo Do  : 1965 Visit date: 2019    Dear  Dr. Landy Bower MD,    Thank you for referring Cielo Do to my practice. Please find my assessment and plan below.          Assessment   Ileostomy in place [facility])  (prima 2018.  The patient was subsequently started on FOLFOX and Avastin.     Last CT scan on November 24, 2018. The patient requires a flexible sigmoidoscopy under general anesthesia. He does not need to hold his Xarelto prior to this procedure.  This will be

## (undated) NOTE — LETTER
3/4/2021  RE: Agustin [place]     : 1965    Dear Dr. Johanna Guillaume,    This letter is to inform you that your patient is being scheduled for surgery with Dr. Brielle Felix on 3/29/1 at [facility]. We have asked the patient to contact your office to schedule a pre-operative exam/visit. Diagnosis: Brain metastases [facility])  Procedure: Craniotomy for Tumor    A Pre-operative History & Physical is needed for medical clearance. Please address patient's active problems (i.e high blood pressure, breathing issues etc.)    [facility] If any labs/testing are being done through the PCP office, then results should be faxed to the pre-admission testing department at [facility] [phone]. Please also fax clearance letter/office visit note to the Pre-Admission department or our office at fax #: [phone]. The following orders will be placed by pre-admission testing:  CBC  Comprehensive Metabolic Panel  PT/PTT/INR  MRSA/MSSA Nasal Swab  Covid-19 testing  (*And any other pertinent testing based on patient's current clinical condition.)      If you have any questions, you may contact our office at [phone], option # 3.       Thank you,    ANDRE Staff

## (undated) NOTE — LETTER
Printed: 2020    Patient Name: Eboni Schulz  : 1965   Medical Record #: [id number]    Consent to Cancer Treatment    I, Eboni Schulz, understand that I have been diagnosed with metastatic HER 2 + Colon cancer .     I understand that have been answered to my satisfaction. I understand that I can contact my oncologist, Dr Janay Johnson or my Cancer Care Team at any time if I have questions, by calling [phone].    Additional written information will be given to me prior to start of therapy

## (undated) NOTE — LETTER
[address] [place] [address],  [address] [address] [address]  [facility]: [phone]  FAX: [phone]      ANTICOAGULATION CLEARANCE REQUEST    Date: 3/12/2021    Attention:  Dr Ricky Bowen          Phone:  [phone]  Fax:  [phone]

## (undated) NOTE — MR AVS SNAPSHOT
After Visit Summary   12/19/2019    Nicole Yousif    MRN: [id number]           Visit Information     Date & Time  12/19/2019  2:00 PM Provider  JUAN PABLO [address] in [address]  Phone  [phone]      Your Vitals We 1/9/2020 9:45 AM  [address] in [place]    1/9/2020 10:15 AM [address] [address] Cristal Hodgson in [place]    2/18/2020 6:00 PM [facility], [address] [facility] now [facility] Visits t Also available by appointment     Average cost  $70*   [address]   Monday – Friday  10:00 am – 10:00 pm   Saturday – Sunday  10:00 am – 4:00 pm     [address]   Monday – Fri

## (undated) NOTE — IP AVS SNAPSHOT
Patient Demographics     Address  [address] [place] [address]  Aubrey Hernandez [address] Phone  [phone] [facility])  [phone] (Mobile) *Preferred* E-mail Address  [email or web address]      Emergency Contact(s)     Name Relation Home Work [facility] 064- pain.  If the pain is not severe, you may take over the counter (OTC) Extra Strength Tylenol for pain relief    • Narcotic pain medications may cause constipation.    OTC stool softeners and laxatives such as Colace, Miralax, or Milk of Magnesia can be take nausea or vomiting  • Redness, drainage, swelling, or warmth of the incision              Follow-up Information     Mariama Kc MD In 1 week.     Specialties: Family Medicine, IP Consult to Primary Care  Why: Blood pressure check  Contact information: Pain.   SAMIRA Ledezma         Ondansetron HCl 8 MG tablet  Commonly known as: Zofran  Notes to patient: Continue home medication as previously prescibed      Take 1 tablet (8 mg total) by mouth every 8 (eight) hours as needed for Nausea.    Holly  hydrALAzine HCl (APRESOLINE) tab 50 mg 04/05/21 1621 Given      [id number] lisinopril tab 10 mg 04/05/21 0820 Given            LEFT LOWER ABDOMEN     Order ID Medication Name Action Time Action Reason Comments    [id number] Heparin Sodium (Porcine) 5000 UNIT ([facility])   Total Cells Counted 100 — — [facility] ([facility])   RBC Morphology See morphology below Normal, Slide reviewed, see previous RBC morphology.  A [facility])   Platelet Morphology Normal Normal — [facility])   Macrocytosis 1+ — — [facility]) ([facility])   Narrative: The following orders were created for panel order CBC WITH DIFFERENTIAL WITH PLATELET.   Procedure                               Abnormality         Status                     ---------                               -- week, abrasion to right elbow and knee. No chest pain or shortness of breath. Feels well and is ready to proceed with surgery. Main complaint continues to be balance issues.      Last Hx: 2/9/21  HISTORY OF PRESENT Myrna Sidhu is a 50year old Tab CR, Take 2 tablets (20 mEq total) by mouth 2 (two) times daily. , Disp: 120 tablet, Rfl: 3, 3/28/2021 at 0800  DENTA 5000 PLUS 1.1 % Dental Cream, USE ONCE DAILY IN PLACE OF REGULAR TOOTHPASTE, Disp: , Rfl: , 3/28/2021 at 1900  Ondansetron HCl (ZOFRAN) explain:   SHANTANU Hayden ] [ Sanjeev Hayden ] [ Carolina Hayden ] [ Clarissa Hayden ] [ Ramsay Gut Taye.Barn ] [ Irven Ormond Beto.Barn ] [ Lynelle Claude Beto.Barn ] [] Abrasion to right elbow and knee noted   NEURO        Awake, alert and orientated.  Speech fluent, [C79.31]  Secondary cancer of brain ([facility] [address]    Subjective:      Reason for Consultation: Impaired ADL and mobility dysfuction due to Craniotomy  History of present illness:  Records reviewed, and patient examined. Consult Requested by: Rhoda Anderson SUBOCCIPITAL CRANIOTOMY FOR CEREBELLAR TUMOR,  MICROSCOPE DISSECTION, NEURO MONITORING N/A 3/29/2021    Performed by Karyl Bence, MD at [facility] MAIN OR   • TONSILLECTOMY     • [address]     • XI ROBOT-ASSISTED LAPAROSCOPIC LOW ANTERIOR COLON RES MG/2ML injection 1 mg, 1 mg, Intravenous, Q5 Min PRN  [] diphenhydrAMINE HCl (BENADRYL) IV PUSH injection 12.5 mg, 12.5 mg, Intravenous, PRN  [] fentaNYL citrate (SUBLIMAZE) 0.05 MG/ML injection 25 mcg, 25 mcg, Intravenous, Q5 Min PRN  [EXPIR Known Allergies        Lab Results   Component Value Date    .0 04/02/2021    CREATSERUM 0.68 04/02/2021    BUN 18 04/02/2021     04/02/2021    K 3.6 04/02/2021    CL 97 04/02/2021    CO2 31.0 04/02/2021    GLU 89 04/02/2021    CA 8.5 04/02/20 Psychiatric: Awake, alert and oriented x 3. Able to register and recall 0/3                                       items. INPATIENT    Room Number: 6712/7040-Y     Session: 8/10[BR.1]  Number of Visits to Meet Established Goals: 10 (2 sessions added.)    Presenting Problem: Suboccipital craniotomy for cerebellar tumor with miscroscopic dissection[BR.2]    History related to c WISDOM TEETH REMOVED     • XI ROBOT-ASSISTED LAPAROSCOPIC LOW ANTERIOR COLON RESECTION N/A 10/12/2018    Performed by Atul Marx MD at [facility]  \"I can't believe how quickly I get tired. \"    Patient’s self-stated goal is to b flexion,hip abd, SLR and SAQ x 10 in supine prior to mobility. Pt fatigues easily. Shoes, socks and AFO donned with total assist.   Supine to sit with supervision. Sit to stand with min assist of one from bed.    Gait training with RW and min assist, lisa PPE used: PT had mask, gloves, and glasses donned throughout session. [BR.1]           Attribution Spears    BR. 1 - Carole Carcamo, PTA on 4/5/2021  3:28 PM  BR. 2 - Rhys Hickman, PTA on 4/5/2021  3:30 PM               Physical Therapy Note signed Laterality Date   • COLECTOMY  10/12/2018   • COLONOSCOPY  08/09/2018   • ILEOSTOMY TAKE DOWN N/A 4/24/2019    Performed by Misty Glass MD at [facility] MAIN OR   • Yoselin [address] N/A 3/29/2021    Performed by Chalino Herrera MD at [address] [place] [address] and from a bed to a chair (including a wheelchair)?: A Little   -   Need to walk in hospital room?: A Little   -   Climbing 3-5 steps with a railing?: A Lot       AM-PAC Score:  Raw Score: 17   Approx Degree of Impairment: 50.57%   Standardized Score (AM-P Pt continues to require frequent rest breaks due to SOB. Pt balance improving in sitting and standing. Pt continues to present with decrease activity tolerance, decrease balance, decrease strength, and decrease functional mobility skills.   Pt may benefit Number of Visits to Meet Established Goals:  (add 3 sessions)    Presenting Problem: Suboccipital craniotomy for cerebellar tumor with miscroscopic dissection    History related to current admission: Patient is a 50year old male admitted on 3/29/2021 from [facility]  \"I'm glad to have a chance to walk again this afternoon\"    Patient’s self-stated goal is to be able to return home when ready. OBJECTIVE  Precautions: Seizure;Bed/chair alarm; Other (Comme denies pain and dizziness throughout session. Pt min assist to don B AFO, supervision to don shoes. Pt sit to stand from [facility] min assist. Pt amb x100 feet x 2 RW, min assist and with multiple standing rest breaks due to SOB.   One minimal LOB noted while pi sit EOB @ level: modified independent      Goal #2 Patient is able to demonstrate transfers Sit to/from Stand at assistance level: supervision      Goal #3 Patient is able to ambulate 150 feet with assist device: cane - straight at assistance level: superv Laterality Date   • COLECTOMY  10/12/2018   • COLONOSCOPY  08/09/2018   • ILEOSTOMY TAKE DOWN N/A 4/24/2019    Performed by Misty Glass MD at [facility] MAIN OR   • Yoselin 38 N/A 3/29/2021    Performed by Chalino Herrera MD at [address] [place] [address] room?: A Little   -   Climbing 3-5 steps with a railing?: A Lot       AM-PAC Score:  Raw Score: 17   Approx Degree of Impairment: 50.57%   Standardized Score (AM-PAC Scale): 42.13   CMS Modifier (G-Code): CK    FUNCTIONAL ABILITY STATUS  Gait Assessment training and continued balance/proprioceptive activites. Pt with improved static standing balance this session, when compared to previous session.   At this time, Pt. presents with decreased balance, impaired strength, difficulty with gait/transfers result encounter.         SLP Note - SLP Notes      SLP Note signed by LUCHO Eubanks at 4/2/2021 12:04 PM  Version 1 of 1    Author: LUCHO Eubanks Service: Rehab Author Type: SPEECH-LANGUAGE PATHOLOGIST    Filed: 4/2/2021 12:04 PM Date of Ser understanding and implementation of aspiration precautions and swallow strategies independently over 1-2 session(s). Met   Goal #2 Patient will express strategies for attention/recall after review with 100% acc, initial cues only.  (Writing, Repetition, As

## (undated) NOTE — LETTER
21    Patient: Anne-Marie Olguin  : 1965 Visit date: 2021    Dear  Bulmaro Muhammad MD    Thank you for referring Anne-Marie Olguin to my practice. Please find my assessment and plan below.     Assessment   Rectal cancer (HCC) at 15 cm [place] Abdominal exam is currently soft, nondistended, nontender, good bowel sounds. No guarding or rebound. No masses. No ascites. Liver and spleen are not palpable. There are no inguinal, umbilical, or incisional hernias present.   Most of his laparoscopic

## (undated) NOTE — LETTER
Printed: 2018    Patient Name: Christiano Zuñiga  : 1965   Medical Record #: [id number]    Consent to Cancer Treatment    I, Christiano Zuñiga, understand that I have been diagnosed with Rectal Cancer (stage IV).     I understand that the rosemary questions have been answered to my satisfaction. I understand that I can contact my oncologist, Dr Alyce Terrazas, or my Cancer Care Team at any time if I have questions, by calling [facility] at [phone].    Additional written information will be gi

## (undated) NOTE — LETTER
21    Patient: Eboni Schulz  : 1965 Visit date: 2021    Dear  Jeannie Cruz MD    Thank you for referring Eboni Iglesiasreillyishaan to my practice. Please find my assessment and plan below.     Assessment   Cancer of descending colon ([facility] [address] a most recent colonoscopy was performed by myself on April 11, 2019 prior to the takedown of his ileostomy.     He underwent ileostomy takedown on April 24, 2019.      The patient recently underwent craniotomy with Dr. Janice Miranda on March 29, 2021 secondary to th

## (undated) NOTE — LETTER
Printed: 2019    Patient Name: Matt Allen  : 1965   Medical Record #: [id number]    Consent to Cancer Treatment    I, Matt Allen, understand that I have been diagnosed with Metastatic HER 2 positive Colon Cancer.     KEREN Rivera have questions, by calling [phone]. Additional written information will be given to me prior to start of therapy. Additionally, I will receive a copy of this consent form. I have read and fully understand this consent to cancer treatment.  I ac

## (undated) NOTE — LETTER
18    Patient: Toby Kellogg  : 1965 Visit date: 7/3/2018    Dear  Dr. Javon Badillo MD,    Thank you for referring Toby Kellogg to my practice. Please find my assessment and plan below.                 Assessment   Family history of colonic patient were answered in detail. A description of the procedure and its possible outcomes was fully discussed. The patient seemed to understand the conversation and its details. Consent for surgery was confirmed with the patient.             Sincerely,

## (undated) NOTE — LETTER
Printed: 2019    Patient Name: Yessenia Trent  : 1965   Medical Record #: [id number]    Consent to Cancer Treatment    I, Yessenia Trent, understand that I have been diagnosed with rectal cancer (stage IV).     I understand that the rosemary have questions, by calling [facility] at [phone]. Additional written information will be given to me prior to start of therapy. Additionally, I will receive a copy of this consent form.     I have read and fully understand this consent t

## (undated) NOTE — IP AVS SNAPSHOT
[address]            (For Outpatient Use Only) Initial Admit Date: 3/29/2021   Inpt/Obs Admit Date: Inpt: 3/29/21 / Obs: N/A   Discharge Date:    Roverto Oreilly:  [de-identified]   MRN: [de-identified]   CSN: [id number]   CEID: [id number] Subscriber ID:  Pt Rel to Subscriber:    Hospital Account Financial Class: Judy Brittle [facility]    April 5, 2021

## (undated) NOTE — LETTER
Patient Name: Toby Kellogg  YOB: 1965          MRN number:  [id number]  Date:  12/1/2020  Referring Physician:  Antionette Sarmiento         INITIAL EVALUATION:    Referring:  William Jimenez PA-C/  Diagnosis:   Gait instability   Unsteadiness -Gait is remarkable for wide base; decrease in velocity; turns en bloc;    Functional assessment:    -30 second sit/stand: 5 reps  -Tandem stance unable; requires UE support  AROM/overpressure:   -Active motion screen of the cervical spine, upper extremitie -Patient was seen for initial evaluation; discussed current presentation relative to medical history; discussed with patient systems of balance and equilibrium including vision, somatosensory, and vestibular function as well as the role of cerebellum with 2.  Patient will demonstrate the ability to consistently transition from various surfaces heights such as when transitioning from standing/sitting without posterior balance loss  3.   Patient will demonstrate the ability to attend to changes in positions hill

## (undated) NOTE — LETTER
19    Patient: Vivian Toure  : 1965 Visit date: 5/3/2019    Dear  Dr. Brenden Vyas MD,    Thank you for referring Vivian Toure to my practice. Please find my assessment and plan below.              Assessment   Rectal cancer (HCC) at 15 cm

## (undated) NOTE — ED AVS SNAPSHOT
Jalen Rico   MRN: [id number]    Department:  [facility] Emergency Department   Date of Visit:  12/31/2018           Disclosure     Insurance plans vary and the physician(s) referred by the ER may not be covered by your plan.  Please contact y tell this physician (or your personal doctor if your instructions are to return to your personal doctor) about any new or lasting problems. The primary care or specialist physician will see patients referred from the [facility] Emergency Department.  Johnson Blank

## (undated) NOTE — LETTER
5/3/2019    Return to School / Work    Name: Barrington Ward        : 1965    To Whom It May Concern,    Barrington Even had surgery on 19 and is:    Able to return to school / work with restrictions:  No lifting over: 10 lbs until 19.

## (undated) NOTE — LETTER
08/15/18    Patient: Giulia Kidd  : 1965 Visit date: 2018    Dear  Dr. Kaushal Rebolledo MD,    Thank you for referring Giulia Kidd to my practice. Please find my assessment and plan below.                Assessment   Rectal cancer ([facility] [address] at 15 require urgent lexical sigmoidoscopy to place a juan miguel at the scar from the polyp that was found at 30 cm from anal verge.   This was done prior to the time of this dictation, the next day after this visit, and the polyp base was found to be at 40 cm from veronica

## (undated) NOTE — LETTER
09/10/20    Patient: Christiano Zuñiga  : 1965 Visit date: 9/10/2020    Dear  Alma Chen MD    Thank you for referring Christiano Zuñiga to my practice. Please find my assessment and plan below. Assessment   No diagnosis found.     Plan   Amita Dural trials of new chemotherapy agents. Unfortunately, on March 18, 2020 he was found to have a lesion on his brain. He subsequently underwent radiation for this lesion. Repeat MRI this month revealed another new brain metastasis.   He was unable to continue